# Patient Record
Sex: FEMALE | Race: WHITE | Employment: UNEMPLOYED | URBAN - METROPOLITAN AREA
[De-identification: names, ages, dates, MRNs, and addresses within clinical notes are randomized per-mention and may not be internally consistent; named-entity substitution may affect disease eponyms.]

---

## 2023-02-02 ENCOUNTER — APPOINTMENT (OUTPATIENT)
Dept: GENERAL RADIOLOGY | Age: 63
End: 2023-02-02
Payer: MEDICARE

## 2023-02-02 ENCOUNTER — HOSPITAL ENCOUNTER (EMERGENCY)
Age: 63
Discharge: HOME | End: 2023-02-02
Attending: EMERGENCY MEDICINE
Payer: MEDICARE

## 2023-02-02 VITALS
WEIGHT: 200 LBS | HEART RATE: 84 BPM | HEIGHT: 71 IN | SYSTOLIC BLOOD PRESSURE: 115 MMHG | RESPIRATION RATE: 18 BRPM | DIASTOLIC BLOOD PRESSURE: 62 MMHG | OXYGEN SATURATION: 100 % | BODY MASS INDEX: 28 KG/M2 | TEMPERATURE: 97.9 F

## 2023-02-02 DIAGNOSIS — M79.632 LEFT FOREARM PAIN: ICD-10-CM

## 2023-02-02 DIAGNOSIS — R11.2 NAUSEA AND VOMITING, UNSPECIFIED VOMITING TYPE: Primary | ICD-10-CM

## 2023-02-02 LAB — TROPONIN I SERPL DL<=0.01 NG/ML-MCNC: 11 NG/L (ref 0–14)

## 2023-02-02 PROCEDURE — 93005 ELECTROCARDIOGRAM TRACING: CPT | Performed by: EMERGENCY MEDICINE

## 2023-02-02 PROCEDURE — 99285 EMERGENCY DEPT VISIT HI MDM: CPT

## 2023-02-02 PROCEDURE — 71045 X-RAY EXAM CHEST 1 VIEW: CPT

## 2023-02-02 PROCEDURE — 84484 ASSAY OF TROPONIN QUANT: CPT

## 2023-02-02 PROCEDURE — 73090 X-RAY EXAM OF FOREARM: CPT

## 2023-02-02 PROCEDURE — 36415 COLL VENOUS BLD VENIPUNCTURE: CPT

## 2023-02-02 RX ORDER — SERTRALINE HYDROCHLORIDE 25 MG/1
TABLET, FILM COATED ORAL
COMMUNITY
Start: 2023-01-26

## 2023-02-02 RX ORDER — FENOFIBRATE 145 MG/1
145 TABLET, COATED ORAL DAILY
COMMUNITY
Start: 2023-01-26

## 2023-02-02 RX ORDER — INSULIN ASPART 100 [IU]/ML
INJECTION, SOLUTION INTRAVENOUS; SUBCUTANEOUS
COMMUNITY
Start: 2019-12-03

## 2023-02-02 RX ORDER — QUETIAPINE FUMARATE 25 MG/1
25 TABLET, FILM COATED ORAL EVERY EVENING
COMMUNITY
Start: 2023-01-26

## 2023-02-02 RX ORDER — HYDROXYZINE HYDROCHLORIDE 25 MG/1
25 TABLET, FILM COATED ORAL NIGHTLY PRN
COMMUNITY
Start: 2021-02-04

## 2023-02-02 RX ORDER — SPIRONOLACTONE 25 MG/1
25 TABLET ORAL DAILY
COMMUNITY
Start: 2023-01-26

## 2023-02-02 RX ORDER — METOPROLOL SUCCINATE 50 MG/1
50 TABLET, EXTENDED RELEASE ORAL DAILY
COMMUNITY
Start: 2023-01-26

## 2023-02-02 RX ORDER — GLIMEPIRIDE 4 MG/1
4 TABLET ORAL 2 TIMES DAILY WITH MEALS
COMMUNITY
Start: 2023-01-26

## 2023-02-02 RX ORDER — ICOSAPENT ETHYL 1000 MG/1
2 CAPSULE ORAL 2 TIMES DAILY WITH MEALS
Status: ON HOLD | COMMUNITY
End: 2023-02-09 | Stop reason: HOSPADM

## 2023-02-02 RX ORDER — ATORVASTATIN CALCIUM 20 MG/1
20 TABLET, FILM COATED ORAL NIGHTLY
COMMUNITY
Start: 2021-04-17

## 2023-02-02 RX ORDER — MIRABEGRON 25 MG/1
25 TABLET, FILM COATED, EXTENDED RELEASE ORAL DAILY
COMMUNITY
Start: 2023-01-26

## 2023-02-02 NOTE — ED PROVIDER NOTES
EMERGENCY DEPARTMENT ENCOUNTER    Pt Name: Darien Zhao  MRN: [id number]  [place] 1960  Date of evaluation: 2/2/23  CHIEF COMPLAINT       Chief Complaint   Patient presents with    Emesis     Started yesterday    Arm Pain     C/o pain left lower arm no known in jury     HISTORY OF PRESENT ILLNESS   Patient is a 69-year-old female who is brought in by family for vomiting and left arm pain. Patient reports vomiting 4 times yesterday on the bus. Symptoms have improved since that time and she had breakfast this morning without incident. She also complained to family of left arm pain yesterday. No trauma or injuries reported. REVIEW OF SYSTEMS     Review of Systems   All other systems reviewed and are negative. PASTMEDICAL HISTORY     Past Medical History:   Diagnosis Date    Cerebral artery occlusion with cerebral infarction ([facility] [address]     Diabetes mellitus ([facility] [address]     HTN (hypertension)     Mental disability      Past Problem List  There is no problem list on file for this patient. SURGICAL HISTORY     History reviewed. No pertinent surgical history.   CURRENT MEDICATIONS       Previous Medications    APIXABAN (ELIQUIS) 5 MG TABS TABLET    Take 5 mg by mouth 2 times daily    ATORVASTATIN (LIPITOR) 20 MG TABLET    Take 20 mg by mouth nightly    CYANOCOBALAMIN 1000 MCG TABLET    Take 1,000 mcg by mouth daily    FENOFIBRATE (TRICOR) 145 MG TABLET        GLIMEPIRIDE (AMARYL) 4 MG TABLET        HYDROXYZINE HCL (ATARAX) 25 MG TABLET    Take 25 mg by mouth nightly as needed    ICOSAPENT ETHYL (VASCEPA) 1 G CAPS CAPSULE    Take 2 g by mouth 2 times daily (with meals)    INSULIN ASPART (NOVOLOG FLEXPEN) 100 UNIT/ML INJECTION PEN    Inject into the skin 3 times daily (with meals)    METFORMIN (GLUCOPHAGE) 500 MG TABLET        METOPROLOL SUCCINATE (TOPROL XL) 50 MG EXTENDED RELEASE TABLET        MYRBETRIQ 25 MG TB24        QUETIAPINE (SEROQUEL) 25 MG TABLET        SERTRALINE (ZOLOFT) 25 MG TABLET        SPIRONOLACTONE (ALDACTONE) 25 MG TABLET         ALLERGIES     has No Known Allergies. FAMILY HISTORY     has no family status information on file. SOCIAL HISTORY       Social History     Tobacco Use    Smoking status: Never    Smokeless tobacco: Never   Substance Use Topics    Alcohol use: Not Currently    Drug use: Not Currently     PHYSICAL EXAM     INITIAL VITALS: /62   Pulse 84   Temp 97.9 °F (36.6 °C) (Oral)   Resp 18   Ht 5' 11\" (1.803 m)   Wt 200 lb (90.7 kg)   SpO2 100%   BMI 27.89 kg/m²    Physical Exam  Constitutional:       Appearance: Normal appearance. HENT:      Head: Normocephalic. Right Ear: External ear normal.      Left Ear: External ear normal.      Nose: Nose normal.   Eyes:      Conjunctiva/sclera: Conjunctivae normal.   Cardiovascular:      Rate and Rhythm: Regular rhythm. Heart sounds: Normal heart sounds. Pulmonary:      Breath sounds: Normal breath sounds. Abdominal:      General: There is no distension. Tenderness: There is no abdominal tenderness. Musculoskeletal:         General: No tenderness. Skin:     General: Skin is dry. Neurological:      Mental Status: She is alert. Mental status is at baseline. MEDICAL DECISION MAKING / ED COURSE:   Summary of Patient Presentation: Temperature 97.9, heart rate 84, and blood pressure 115/62. Pulse oximetry on room air is 100%. Physical exam unremarkable. Plan for EKG, troponin, chest x-ray, left forearm x-ray, likely discharge      1)  Number and Complexity of Problems  Problem List This Visit: Vomiting, arm pain. Differential Diagnosis: MSK pain. Diagnoses Considered but Do Not Suspect: Referred pain from heart. PE    Pertinent Comorbid Conditions: Cerebral palsy, diabetes, hypertension. 2)  Data Reviewed  Patient's EKG independently interpreted by me: No ST elevation, no ST depressions, no abnormal T wave morphology.     Decision Rules/Scores utilized:  N/A    HEART SCORE: 1    NIH STROKE SCALE: N/A, no focal neurodeficits. External Documents Reviewed: I have independently reviewed patient's previous medical records including labs, notes and imaging. Tests considered but not ordered and why:  N/A    Imaging that is independently reviewed and interpreted by me as: Chest x-ray negative for infiltrate. See more data below for the lab and radiology tests and orders. 3)  Treatment and Disposition    Patient repeat assessment:  N/A    Disposition discussion with patient/family: Patient aware and agrees with disposition plan. Case discussed with consulting clinician:  N/A    MIPS:  N/A    Social determinants of health impacting treatment or disposition:  None    Shared Decision Making completed with patient regarding risks and benefits of admission versus discharge. Patient decides to be discharged home. Code Status Discussion:  Not discussed    \"ED Course\" Notes From Epic Narrator:     Patient did well in the ED. Troponin negative. Chest x-ray negative for infiltrate. X-ray left forearm negative for acute osseous injury. Diagnosis most consistent with musculoskeletal pain. Patient feels well, asymptomatic in the ED. All questions answered. Given strict return precautions. No further work-up indicated this time. CRITICAL CARE:   N/A    PROCEDURES:  N/A      DATA FOR LAB AND RADIOLOGY TESTS ORDERED BELOW ARE REVIEWED BY THE ED CLINICIAN:    RADIOLOGY: All x-rays, CT, MRI, and formal ultrasound images (except ED bedside ultrasound) are read by the radiologist, see reports below, unless otherwise noted in MDM or here. Reports below are reviewed by myself. XR RADIUS ULNA LEFT (2 VIEWS)   Final Result   No acute osseous abnormality. XR CHEST PORTABLE   Final Result   No acute process. LABS: Lab orders shown below, the results are reviewed by myself, and all abnormals are listed below.   Labs Reviewed   TROPONIN       Vitals Reviewed:    Vitals:    02/02/23 6011 02/02/23 0936   BP: 115/62    Pulse: 84    Resp: 18    Temp: 97.9 °F (36.6 °C)    TempSrc: Oral    SpO2: 100%    Weight:  200 lb (90.7 kg)   Height:  5' 11\" (1.803 m)     MEDICATIONS GIVEN TO PATIENT THIS ENCOUNTER:  No orders of the defined types were placed in this encounter. DISCHARGE PRESCRIPTIONS:  New Prescriptions    No medications on file     PHYSICIAN CONSULTS ORDERED THIS ENCOUNTER:  None  FINAL IMPRESSION      1. Nausea and vomiting, unspecified vomiting type    2.  Left forearm pain          DISPOSITION/PLAN   DISPOSITION Decision To Discharge 02/02/2023 12:12:02 PM      OUTPATIENT FOLLOW UP THE PATIENT:  Gómez White MD  [facility] [address] [place] [address] [phone]    In 2 days      MD Harpal Chirinos MD  02/02/23 5105

## 2023-02-03 LAB
EKG ATRIAL RATE: 80 BPM
EKG P AXIS: 65 DEGREES
EKG P-R INTERVAL: 142 MS
EKG Q-T INTERVAL: 408 MS
EKG QRS DURATION: 80 MS
EKG QTC CALCULATION (BAZETT): 470 MS
EKG R AXIS: -46 DEGREES
EKG T AXIS: 55 DEGREES
EKG VENTRICULAR RATE: 80 BPM

## 2023-02-03 PROCEDURE — 93010 ELECTROCARDIOGRAM REPORT: CPT | Performed by: INTERNAL MEDICINE

## 2023-02-04 ENCOUNTER — APPOINTMENT (OUTPATIENT)
Dept: GENERAL RADIOLOGY | Age: 63
End: 2023-02-04
Payer: MEDICARE

## 2023-02-04 ENCOUNTER — HOSPITAL ENCOUNTER (INPATIENT)
Age: 63
LOS: 6 days | Discharge: HOME | End: 2023-02-10
Attending: EMERGENCY MEDICINE | Admitting: FAMILY MEDICINE
Payer: MEDICARE

## 2023-02-04 DIAGNOSIS — I47.1 PAROXYSMAL SUPRAVENTRICULAR TACHYCARDIA (HCC): Primary | ICD-10-CM

## 2023-02-04 DIAGNOSIS — I50.9 ACUTE CONGESTIVE HEART FAILURE, UNSPECIFIED HEART FAILURE TYPE (HCC): ICD-10-CM

## 2023-02-04 LAB
ABSOLUTE EOS #: <0.03 K/UL (ref 0–0.44)
ABSOLUTE IMMATURE GRANULOCYTE: 0.03 K/UL (ref 0–0.3)
ABSOLUTE LYMPH #: 0.75 K/UL (ref 1.1–3.7)
ABSOLUTE MONO #: 0.63 K/UL (ref 0.1–1.2)
ANION GAP SERPL CALCULATED.3IONS-SCNC: 14 MMOL/L (ref 9–17)
BASOPHILS # BLD: 0 % (ref 0–2)
BASOPHILS ABSOLUTE: 0.03 K/UL (ref 0–0.2)
BNP SERPL-MCNC: 2364 PG/ML
BUN SERPL-MCNC: 26 MG/DL (ref 8–23)
BUN/CREAT BLD: 26 (ref 9–20)
CALCIUM SERPL-MCNC: 9 MG/DL (ref 8.6–10.4)
CHLORIDE SERPL-SCNC: 100 MMOL/L (ref 98–107)
CO2 SERPL-SCNC: 16 MMOL/L (ref 20–31)
CREAT SERPL-MCNC: 1.01 MG/DL (ref 0.5–0.9)
EOSINOPHILS RELATIVE PERCENT: 0 % (ref 1–4)
GFR SERPL CREATININE-BSD FRML MDRD: >60 ML/MIN/1.73M2
GLUCOSE BLD-MCNC: 189 MG/DL (ref 65–105)
GLUCOSE SERPL-MCNC: 274 MG/DL (ref 70–99)
HCT VFR BLD AUTO: 39.4 % (ref 36.3–47.1)
HGB BLD-MCNC: 12.5 G/DL (ref 11.9–15.1)
IMMATURE GRANULOCYTES: 0 %
LYMPHOCYTES # BLD: 10 % (ref 24–43)
MCH RBC QN AUTO: 27.4 PG (ref 25.2–33.5)
MCHC RBC AUTO-ENTMCNC: 31.7 G/DL (ref 28.4–34.8)
MCV RBC AUTO: 86.4 FL (ref 82.6–102.9)
MONOCYTES # BLD: 8 % (ref 3–12)
MYOGLOBIN SERPL-MCNC: 72 NG/ML (ref 25–58)
MYOGLOBIN SERPL-MCNC: 81 NG/ML (ref 25–58)
NRBC AUTOMATED: 0 PER 100 WBC
PDW BLD-RTO: 14.5 % (ref 11.8–14.4)
PLATELET # BLD AUTO: 136 K/UL (ref 138–453)
PMV BLD AUTO: 10.9 FL (ref 8.1–13.5)
POTASSIUM SERPL-SCNC: 3.6 MMOL/L (ref 3.7–5.3)
RBC # BLD: 4.56 M/UL (ref 3.95–5.11)
RBC # BLD: ABNORMAL 10*6/UL
SEG NEUTROPHILS: 82 % (ref 36–65)
SEGMENTED NEUTROPHILS ABSOLUTE COUNT: 6.07 K/UL (ref 1.5–8.1)
SODIUM SERPL-SCNC: 130 MMOL/L (ref 135–144)
TROPONIN I SERPL DL<=0.01 NG/ML-MCNC: 33 NG/L (ref 0–14)
TROPONIN I SERPL DL<=0.01 NG/ML-MCNC: 49 NG/L (ref 0–14)
TSH SERPL-ACNC: 4.96 UIU/ML (ref 0.3–5)
WBC # BLD AUTO: 7.5 K/UL (ref 3.5–11.3)

## 2023-02-04 PROCEDURE — 83880 ASSAY OF NATRIURETIC PEPTIDE: CPT

## 2023-02-04 PROCEDURE — 93005 ELECTROCARDIOGRAM TRACING: CPT | Performed by: EMERGENCY MEDICINE

## 2023-02-04 PROCEDURE — 99285 EMERGENCY DEPT VISIT HI MDM: CPT

## 2023-02-04 PROCEDURE — 96374 THER/PROPH/DIAG INJ IV PUSH: CPT

## 2023-02-04 PROCEDURE — 71045 X-RAY EXAM CHEST 1 VIEW: CPT

## 2023-02-04 PROCEDURE — 80048 BASIC METABOLIC PNL TOTAL CA: CPT

## 2023-02-04 PROCEDURE — 84443 ASSAY THYROID STIM HORMONE: CPT

## 2023-02-04 PROCEDURE — 6360000002 HC RX W HCPCS: Performed by: EMERGENCY MEDICINE

## 2023-02-04 PROCEDURE — 85025 COMPLETE CBC W/AUTO DIFF WBC: CPT

## 2023-02-04 PROCEDURE — 84484 ASSAY OF TROPONIN QUANT: CPT

## 2023-02-04 PROCEDURE — 82947 ASSAY GLUCOSE BLOOD QUANT: CPT

## 2023-02-04 PROCEDURE — 96375 TX/PRO/DX INJ NEW DRUG ADDON: CPT

## 2023-02-04 PROCEDURE — 83874 ASSAY OF MYOGLOBIN: CPT

## 2023-02-04 PROCEDURE — 2060000000 HC ICU INTERMEDIATE R&B

## 2023-02-04 RX ORDER — ADENOSINE 3 MG/ML
6 INJECTION, SOLUTION INTRAVENOUS ONCE
Status: COMPLETED | OUTPATIENT
Start: 2023-02-04 | End: 2023-02-04

## 2023-02-04 RX ORDER — SODIUM CHLORIDE 9 MG/ML
INJECTION, SOLUTION INTRAVENOUS PRN
Status: DISCONTINUED | OUTPATIENT
Start: 2023-02-04 | End: 2023-02-10 | Stop reason: HOSPADM

## 2023-02-04 RX ORDER — POLYETHYLENE GLYCOL 3350 17 G/17G
17 POWDER, FOR SOLUTION ORAL DAILY PRN
Status: DISCONTINUED | OUTPATIENT
Start: 2023-02-04 | End: 2023-02-10 | Stop reason: HOSPADM

## 2023-02-04 RX ORDER — ACETAMINOPHEN 325 MG/1
650 TABLET ORAL EVERY 6 HOURS PRN
Status: DISCONTINUED | OUTPATIENT
Start: 2023-02-04 | End: 2023-02-10 | Stop reason: HOSPADM

## 2023-02-04 RX ORDER — ADENOSINE 3 MG/ML
6 INJECTION, SOLUTION INTRAVENOUS ONCE
Status: DISCONTINUED | OUTPATIENT
Start: 2023-02-04 | End: 2023-02-10 | Stop reason: HOSPADM

## 2023-02-04 RX ORDER — POTASSIUM CHLORIDE 20 MEQ/1
40 TABLET, EXTENDED RELEASE ORAL PRN
Status: DISCONTINUED | OUTPATIENT
Start: 2023-02-04 | End: 2023-02-10 | Stop reason: HOSPADM

## 2023-02-04 RX ORDER — FUROSEMIDE 10 MG/ML
40 INJECTION INTRAMUSCULAR; INTRAVENOUS DAILY
Status: DISCONTINUED | OUTPATIENT
Start: 2023-02-05 | End: 2023-02-08

## 2023-02-04 RX ORDER — ACETAMINOPHEN 650 MG/1
650 SUPPOSITORY RECTAL EVERY 6 HOURS PRN
Status: DISCONTINUED | OUTPATIENT
Start: 2023-02-04 | End: 2023-02-10 | Stop reason: HOSPADM

## 2023-02-04 RX ORDER — ENOXAPARIN SODIUM 100 MG/ML
40 INJECTION SUBCUTANEOUS DAILY
Status: DISCONTINUED | OUTPATIENT
Start: 2023-02-04 | End: 2023-02-05

## 2023-02-04 RX ORDER — ADENOSINE 3 MG/ML
INJECTION, SOLUTION INTRAVENOUS
Status: DISPENSED
Start: 2023-02-04 | End: 2023-02-05

## 2023-02-04 RX ORDER — POTASSIUM CHLORIDE 7.45 MG/ML
10 INJECTION INTRAVENOUS PRN
Status: DISCONTINUED | OUTPATIENT
Start: 2023-02-04 | End: 2023-02-10 | Stop reason: HOSPADM

## 2023-02-04 RX ORDER — SODIUM CHLORIDE 0.9 % (FLUSH) 0.9 %
10 SYRINGE (ML) INJECTION PRN
Status: DISCONTINUED | OUTPATIENT
Start: 2023-02-04 | End: 2023-02-10 | Stop reason: HOSPADM

## 2023-02-04 RX ORDER — FUROSEMIDE 10 MG/ML
20 INJECTION INTRAMUSCULAR; INTRAVENOUS ONCE
Status: COMPLETED | OUTPATIENT
Start: 2023-02-04 | End: 2023-02-04

## 2023-02-04 RX ORDER — ONDANSETRON 4 MG/1
4 TABLET, ORALLY DISINTEGRATING ORAL EVERY 8 HOURS PRN
Status: DISCONTINUED | OUTPATIENT
Start: 2023-02-04 | End: 2023-02-10 | Stop reason: HOSPADM

## 2023-02-04 RX ORDER — ONDANSETRON 2 MG/ML
4 INJECTION INTRAMUSCULAR; INTRAVENOUS EVERY 6 HOURS PRN
Status: DISCONTINUED | OUTPATIENT
Start: 2023-02-04 | End: 2023-02-10 | Stop reason: HOSPADM

## 2023-02-04 RX ORDER — SODIUM CHLORIDE 0.9 % (FLUSH) 0.9 %
5-40 SYRINGE (ML) INJECTION EVERY 12 HOURS SCHEDULED
Status: DISCONTINUED | OUTPATIENT
Start: 2023-02-04 | End: 2023-02-10 | Stop reason: HOSPADM

## 2023-02-04 RX ORDER — MAGNESIUM SULFATE 1 G/100ML
1000 INJECTION INTRAVENOUS PRN
Status: DISCONTINUED | OUTPATIENT
Start: 2023-02-04 | End: 2023-02-10 | Stop reason: HOSPADM

## 2023-02-04 RX ADMIN — FUROSEMIDE 20 MG: 10 INJECTION, SOLUTION INTRAMUSCULAR; INTRAVENOUS at 20:06

## 2023-02-04 RX ADMIN — ADENOSINE 6 MG: 3 INJECTION, SOLUTION INTRAVENOUS at 19:00

## 2023-02-04 ASSESSMENT — PAIN - FUNCTIONAL ASSESSMENT: PAIN_FUNCTIONAL_ASSESSMENT: NONE - DENIES PAIN

## 2023-02-04 NOTE — ED NOTES
Upon placing pt on cardiac monitor, pt heart rate is 160 bpm. Pt denies any chest pain or shortness of breath. Pt denies ANY pain at this time.       Dixie Jimenez RN  02/04/23 7809

## 2023-02-05 LAB
ABSOLUTE EOS #: <0.03 K/UL (ref 0–0.44)
ABSOLUTE IMMATURE GRANULOCYTE: 0.03 K/UL (ref 0–0.3)
ABSOLUTE LYMPH #: 0.96 K/UL (ref 1.1–3.7)
ABSOLUTE MONO #: 0.6 K/UL (ref 0.1–1.2)
ANION GAP SERPL CALCULATED.3IONS-SCNC: 8 MMOL/L (ref 9–17)
BASOPHILS # BLD: 0 % (ref 0–2)
BASOPHILS ABSOLUTE: <0.03 K/UL (ref 0–0.2)
BUN SERPL-MCNC: 26 MG/DL (ref 8–23)
BUN/CREAT BLD: 26 (ref 9–20)
CALCIUM SERPL-MCNC: 8.7 MG/DL (ref 8.6–10.4)
CHLORIDE SERPL-SCNC: 102 MMOL/L (ref 98–107)
CO2 SERPL-SCNC: 26 MMOL/L (ref 20–31)
CREAT SERPL-MCNC: 1.01 MG/DL (ref 0.5–0.9)
EOSINOPHILS RELATIVE PERCENT: 0 % (ref 1–4)
GFR SERPL CREATININE-BSD FRML MDRD: >60 ML/MIN/1.73M2
GLUCOSE BLD-MCNC: 101 MG/DL (ref 65–105)
GLUCOSE BLD-MCNC: 125 MG/DL (ref 65–105)
GLUCOSE BLD-MCNC: 132 MG/DL (ref 65–105)
GLUCOSE BLD-MCNC: 165 MG/DL (ref 65–105)
GLUCOSE BLD-MCNC: 177 MG/DL (ref 65–105)
GLUCOSE SERPL-MCNC: 140 MG/DL (ref 70–99)
HCT VFR BLD AUTO: 36.9 % (ref 36.3–47.1)
HGB BLD-MCNC: 11.5 G/DL (ref 11.9–15.1)
IMMATURE GRANULOCYTES: 1 %
INR PPP: 1.7
LYMPHOCYTES # BLD: 16 % (ref 24–43)
MAGNESIUM SERPL-MCNC: 1.6 MG/DL (ref 1.6–2.6)
MCH RBC QN AUTO: 27.4 PG (ref 25.2–33.5)
MCHC RBC AUTO-ENTMCNC: 31.2 G/DL (ref 28.4–34.8)
MCV RBC AUTO: 87.9 FL (ref 82.6–102.9)
MONOCYTES # BLD: 10 % (ref 3–12)
NRBC AUTOMATED: 0 PER 100 WBC
PDW BLD-RTO: 14.4 % (ref 11.8–14.4)
PLATELET # BLD AUTO: 133 K/UL (ref 138–453)
PMV BLD AUTO: 10.4 FL (ref 8.1–13.5)
POTASSIUM SERPL-SCNC: 3.5 MMOL/L (ref 3.7–5.3)
PROTHROMBIN TIME: 19.9 SEC (ref 11.5–14.2)
RBC # BLD: 4.2 M/UL (ref 3.95–5.11)
SEG NEUTROPHILS: 73 % (ref 36–65)
SEGMENTED NEUTROPHILS ABSOLUTE COUNT: 4.4 K/UL (ref 1.5–8.1)
SODIUM SERPL-SCNC: 136 MMOL/L (ref 135–144)
WBC # BLD AUTO: 6 K/UL (ref 3.5–11.3)

## 2023-02-05 PROCEDURE — 36415 COLL VENOUS BLD VENIPUNCTURE: CPT

## 2023-02-05 PROCEDURE — 97530 THERAPEUTIC ACTIVITIES: CPT

## 2023-02-05 PROCEDURE — 85025 COMPLETE CBC W/AUTO DIFF WBC: CPT

## 2023-02-05 PROCEDURE — 82947 ASSAY GLUCOSE BLOOD QUANT: CPT

## 2023-02-05 PROCEDURE — 6370000000 HC RX 637 (ALT 250 FOR IP): Performed by: FAMILY MEDICINE

## 2023-02-05 PROCEDURE — 97163 PT EVAL HIGH COMPLEX 45 MIN: CPT

## 2023-02-05 PROCEDURE — 6360000002 HC RX W HCPCS: Performed by: NURSE PRACTITIONER

## 2023-02-05 PROCEDURE — 85610 PROTHROMBIN TIME: CPT

## 2023-02-05 PROCEDURE — 6370000000 HC RX 637 (ALT 250 FOR IP): Performed by: NURSE PRACTITIONER

## 2023-02-05 PROCEDURE — 83735 ASSAY OF MAGNESIUM: CPT

## 2023-02-05 PROCEDURE — 2580000003 HC RX 258: Performed by: NURSE PRACTITIONER

## 2023-02-05 PROCEDURE — 80048 BASIC METABOLIC PNL TOTAL CA: CPT

## 2023-02-05 PROCEDURE — 94761 N-INVAS EAR/PLS OXIMETRY MLT: CPT

## 2023-02-05 PROCEDURE — 2700000000 HC OXYGEN THERAPY PER DAY

## 2023-02-05 PROCEDURE — 2060000000 HC ICU INTERMEDIATE R&B

## 2023-02-05 RX ORDER — QUETIAPINE FUMARATE 25 MG/1
25 TABLET, FILM COATED ORAL NIGHTLY
Status: DISCONTINUED | OUTPATIENT
Start: 2023-02-05 | End: 2023-02-10 | Stop reason: HOSPADM

## 2023-02-05 RX ORDER — ATORVASTATIN CALCIUM 20 MG/1
20 TABLET, FILM COATED ORAL NIGHTLY
Status: DISCONTINUED | OUTPATIENT
Start: 2023-02-05 | End: 2023-02-10 | Stop reason: HOSPADM

## 2023-02-05 RX ORDER — HYDROXYZINE HYDROCHLORIDE 25 MG/1
25 TABLET, FILM COATED ORAL NIGHTLY PRN
Status: DISCONTINUED | OUTPATIENT
Start: 2023-02-05 | End: 2023-02-10 | Stop reason: HOSPADM

## 2023-02-05 RX ORDER — OMEGA-3-ACID ETHYL ESTERS 1 G/1
1 CAPSULE, LIQUID FILLED ORAL 2 TIMES DAILY
Status: DISCONTINUED | OUTPATIENT
Start: 2023-02-05 | End: 2023-02-10 | Stop reason: HOSPADM

## 2023-02-05 RX ORDER — INSULIN LISPRO 100 [IU]/ML
0-8 INJECTION, SOLUTION INTRAVENOUS; SUBCUTANEOUS
Status: DISCONTINUED | OUTPATIENT
Start: 2023-02-05 | End: 2023-02-09

## 2023-02-05 RX ORDER — METOPROLOL SUCCINATE 50 MG/1
50 TABLET, EXTENDED RELEASE ORAL DAILY
Status: DISCONTINUED | OUTPATIENT
Start: 2023-02-05 | End: 2023-02-10 | Stop reason: HOSPADM

## 2023-02-05 RX ORDER — METHENAMINE HIPPURATE 1000 MG/1
1 TABLET ORAL 2 TIMES DAILY WITH MEALS
COMMUNITY

## 2023-02-05 RX ORDER — GLIPIZIDE 5 MG/1
2.5 TABLET ORAL
Status: DISCONTINUED | OUTPATIENT
Start: 2023-02-06 | End: 2023-02-10 | Stop reason: HOSPADM

## 2023-02-05 RX ORDER — SPIRONOLACTONE 25 MG/1
25 TABLET ORAL DAILY
Status: DISCONTINUED | OUTPATIENT
Start: 2023-02-05 | End: 2023-02-10 | Stop reason: HOSPADM

## 2023-02-05 RX ORDER — DEXTROSE MONOHYDRATE 100 MG/ML
INJECTION, SOLUTION INTRAVENOUS CONTINUOUS PRN
Status: DISCONTINUED | OUTPATIENT
Start: 2023-02-05 | End: 2023-02-10 | Stop reason: HOSPADM

## 2023-02-05 RX ORDER — FENOFIBRATE 54 MG/1
54 TABLET ORAL DAILY
Status: DISCONTINUED | OUTPATIENT
Start: 2023-02-05 | End: 2023-02-10 | Stop reason: HOSPADM

## 2023-02-05 RX ORDER — INSULIN LISPRO 100 [IU]/ML
0-4 INJECTION, SOLUTION INTRAVENOUS; SUBCUTANEOUS NIGHTLY
Status: DISCONTINUED | OUTPATIENT
Start: 2023-02-05 | End: 2023-02-09

## 2023-02-05 RX ADMIN — APIXABAN 5 MG: 5 TABLET, FILM COATED ORAL at 20:05

## 2023-02-05 RX ADMIN — SODIUM CHLORIDE, PRESERVATIVE FREE 10 ML: 5 INJECTION INTRAVENOUS at 20:06

## 2023-02-05 RX ADMIN — OMEGA-3-ACID ETHYL ESTERS 1 G: 1 CAPSULE, LIQUID FILLED ORAL at 20:10

## 2023-02-05 RX ADMIN — SODIUM CHLORIDE, PRESERVATIVE FREE 10 ML: 5 INJECTION INTRAVENOUS at 08:46

## 2023-02-05 RX ADMIN — SPIRONOLACTONE 25 MG: 25 TABLET ORAL at 11:56

## 2023-02-05 RX ADMIN — ACETAMINOPHEN 650 MG: 325 TABLET ORAL at 08:40

## 2023-02-05 RX ADMIN — ACETAMINOPHEN 650 MG: 325 TABLET ORAL at 18:18

## 2023-02-05 RX ADMIN — ATORVASTATIN CALCIUM 20 MG: 20 TABLET, FILM COATED ORAL at 20:05

## 2023-02-05 RX ADMIN — SERTRALINE 25 MG: 50 TABLET, FILM COATED ORAL at 11:56

## 2023-02-05 RX ADMIN — FENOFIBRATE 54 MG: 54 TABLET ORAL at 12:57

## 2023-02-05 RX ADMIN — FUROSEMIDE 40 MG: 10 INJECTION, SOLUTION INTRAMUSCULAR; INTRAVENOUS at 08:40

## 2023-02-05 RX ADMIN — POTASSIUM CHLORIDE 40 MEQ: 1500 TABLET, EXTENDED RELEASE ORAL at 08:40

## 2023-02-05 RX ADMIN — ACETAMINOPHEN 650 MG: 325 TABLET ORAL at 23:30

## 2023-02-05 RX ADMIN — APIXABAN 5 MG: 5 TABLET, FILM COATED ORAL at 11:57

## 2023-02-05 RX ADMIN — QUETIAPINE FUMARATE 25 MG: 25 TABLET ORAL at 20:05

## 2023-02-05 RX ADMIN — METOPROLOL SUCCINATE 50 MG: 50 TABLET, EXTENDED RELEASE ORAL at 11:57

## 2023-02-05 ASSESSMENT — PAIN SCALES - GENERAL: PAINLEVEL_OUTOF10: 7

## 2023-02-05 ASSESSMENT — PAIN DESCRIPTION - DESCRIPTORS: DESCRIPTORS: ACHING

## 2023-02-05 ASSESSMENT — PAIN DESCRIPTION - LOCATION: LOCATION: HEAD

## 2023-02-05 NOTE — ED PROVIDER NOTES
[facility] ED  EMERGENCY DEPARTMENT ENCOUNTER      Pt Name: Jaden Davies  MRN: [id number]  [place] 1960  Date of evaluation: 2/4/2023  Provider: Marybelle Krabbe, MD    CHIEF COMPLAINT       Chief Complaint   Patient presents with    Fall     Two falls at group home today    Extremity Weakness     Ambulates with walker but group home concerned pt is unable to get around anymore          HISTORY OF PRESENT ILLNESS  (Location/Symptom, Timing/Onset, Context/Setting, Quality, Duration, Modifying Factors, Severity.)   Jaden Davies is a 60 y.o. female who presents to the emergency department because she fell. She was eased down to the ground and did not injure herself in any fashion. Her heart rate was noted to be fast.  She is not complaining of shortness of breath. She has MRDD and is not a good historian. Her sister accompanies her and gives some of the history. There is no previous diagnosis of CHF. She has not had a fever. She lives in a group home. This occurred just before coming into the emergency department. Nursing Notes were reviewed. ALLERGIES     Patient has no known allergies.     CURRENT MEDICATIONS       Previous Medications    APIXABAN (ELIQUIS) 5 MG TABS TABLET    Take 5 mg by mouth 2 times daily    ATORVASTATIN (LIPITOR) 20 MG TABLET    Take 20 mg by mouth nightly    CYANOCOBALAMIN 1000 MCG TABLET    Take 1,000 mcg by mouth daily    FENOFIBRATE (TRICOR) 145 MG TABLET        GLIMEPIRIDE (AMARYL) 4 MG TABLET        HYDROXYZINE HCL (ATARAX) 25 MG TABLET    Take 25 mg by mouth nightly as needed    ICOSAPENT ETHYL (VASCEPA) 1 G CAPS CAPSULE    Take 2 g by mouth 2 times daily (with meals)    INSULIN ASPART (NOVOLOG FLEXPEN) 100 UNIT/ML INJECTION PEN    Inject into the skin 3 times daily (with meals)    METFORMIN (GLUCOPHAGE) 500 MG TABLET        METOPROLOL SUCCINATE (TOPROL XL) 50 MG EXTENDED RELEASE TABLET        MYRBETRIQ 25 MG TB24        QUETIAPINE (SEROQUEL) 25 MG TABLET SERTRALINE (ZOLOFT) 25 MG TABLET        SPIRONOLACTONE (ALDACTONE) 25 MG TABLET           PAST MEDICAL HISTORY         Diagnosis Date    Cerebral artery occlusion with cerebral infarction ([facility] [address]     Diabetes mellitus ([facility] [address]     HTN (hypertension)     Mental disability        SURGICAL HISTORY     History reviewed. No pertinent surgical history. FAMILY HISTORY     History reviewed. No pertinent family history. No family status information on file. SOCIAL HISTORY      reports that she has never smoked. She has never used smokeless tobacco. She reports that she does not currently use alcohol. She reports that she does not currently use drugs. REVIEW OF SYSTEMS    (2-9 systems for level 4, 10 or more for level 5)     Review of Systems   Unable to perform ROS: Psychiatric disorder      Except as noted above the remainder of the review of systems was reviewed and negative. PHYSICAL EXAM    (up to 7 for level 4, 8 or more for level 5)     Vitals:    02/04/23 1930 02/04/23 1936 02/04/23 1945 02/04/23 1956   BP: 111/72  110/71    Pulse: (!) 102 (!) 101 (!) 101 100   Resp: 24 22 25 22   Temp:       TempSrc:       SpO2: 93% 92% 94% 94%       Physical Exam  Vitals reviewed. Constitutional:       General: She is not in acute distress. Appearance: She is well-developed. She is not diaphoretic. HENT:      Head: Normocephalic and atraumatic. Eyes:      General: No scleral icterus. Right eye: No discharge. Left eye: No discharge. Cardiovascular:      Rate and Rhythm: Regular rhythm. Tachycardia present. Pulmonary:      Effort: Pulmonary effort is normal. No respiratory distress. Breath sounds: Normal breath sounds. No stridor. No wheezing or rales. Abdominal:      General: There is no distension. Palpations: Abdomen is soft. Tenderness: There is no abdominal tenderness. Comments: Abdomen is obese   Musculoskeletal:         General: Normal range of motion. Cervical back: Neck supple. Lymphadenopathy:      Cervical: No cervical adenopathy. Skin:     General: Skin is warm and dry. Findings: No erythema or rash. Neurological:      Mental Status: She is alert. Comments: Awake and alert   Psychiatric:         Behavior: Behavior normal.           DIAGNOSTIC RESULTS     EKG: All EKG's are interpreted by the Emergency Department Physician who either signs or Co-signs this chart in the absence of a cardiologist.    Initial EKG on my interpretation shows SVT. Repeat EKG on my interpretation shows sinus tachycardia    RADIOLOGY:   Non-plain film images such as CT, Ultrasound and MRI are read by the radiologist. Plain radiographic images are visualized and preliminarily interpreted by the emergency physician with the below findings:    Interpretation per the Radiologist below, if available at the time of this note:    XR CHEST PORTABLE    Result Date: 2/4/2023  EXAMINATION: [address] 2/4/2023 7:00 pm COMPARISON: 02/02/2023 HISTORY: ORDERING SYSTEM PROVIDED HISTORY: weak TECHNOLOGIST PROVIDED HISTORY: weak Reason for Exam: Fall; Extremity Weakness Additional signs and symptoms: Fall; Extremity Weakness FINDINGS: The cardiomediastinal silhouette is enlarged. No pleural effusion or pneumothorax. Patchy interstitial and alveolar opacities, new from prior study. Findings favoring moderate CHF pattern pulmonary vascular congestion. Difficult to exclude superimposed infection. Attention on follow-up.        LABS:  Labs Reviewed   CBC WITH AUTO DIFFERENTIAL - Abnormal; Notable for the following components:       Result Value    RDW 14.5 (*)     Platelets 473 (*)     Seg Neutrophils 82 (*)     Lymphocytes 10 (*)     Eosinophils % 0 (*)     Absolute Lymph # 0.75 (*)     All other components within normal limits   BASIC METABOLIC PANEL - Abnormal; Notable for the following components:    Glucose 274 (*)     BUN 26 (*)     Creatinine 1.01 (*) Bun/Cre Ratio 26 (*)     Sodium 130 (*)     Potassium 3.6 (*)     CO2 16 (*)     All other components within normal limits   TROP/MYOGLOBIN - Abnormal; Notable for the following components:    Troponin, High Sensitivity 33 (*)     Myoglobin 72 (*)     All other components within normal limits   BRAIN NATRIURETIC PEPTIDE - Abnormal; Notable for the following components:    Pro-BNP 2,364 (*)     All other components within normal limits   TSH       All other labs were within normal range or not returned as of this dictation. EMERGENCY DEPARTMENT COURSE and DIFFERENTIAL DIAGNOSIS/MDM:   Vitals:    Vitals:    02/04/23 1930 02/04/23 1936 02/04/23 1945 02/04/23 1956   BP: 111/72  110/71    Pulse: (!) 102 (!) 101 (!) 101 100   Resp: 24 22 25 22   Temp:       TempSrc:       SpO2: 93% 92% 94% 94%       Orders Placed This Encounter   Medications    adenosine (ADENOCARD) injection 6 mg    adenosine (ADENOCARD) 6 MG/2ML injection     GRZEGORZ MEIER: normainet override    adenosine (ADENOCARD) injection 6 mg    furosemide (LASIX) injection 20 mg       HEART SCORE: Not applicable    Medical Decision Making: The patient presented with SVT. She was given 6 mg of adenosine which ultimately put her back into a normal sinus rhythm with a rate of around 100. Very slight elevation in the troponin present. Repeat pending. Additionally the patient has CHF on the chest x-ray and her BNP is elevated. She was given 20 mg of IV Lasix and she is being admitted. Findings are discussed thoroughly with the patient's sister. Evaluation and treatment course in the ED, and plan of care upon discharge was discussed in length with the patient's sister. DDx include SVT, A. fib, a flutter    CRITICAL CARE TIME     Due to the high probability of sudden and clinically significant deterioration in the patient's condition she required highest level of my preparedness to intervene urgently.  I provided critical care time including documentation time, medication orders and management, reevaluation, vital sign assessment, ordering and reviewing of of lab tests ordering and reviewing of x-ray studies, and admission orders. Aggregate critical care time is 40 minutes including only time during which I was engaged in work directly related to her care and did not include time spent treating other patients simultaneously. I will order labs including CBC, BMP, BNP, TSH, complete CXR and monitor closely. Test considered, but not ordered: None    The patient was involved in his/her plan of care. The testing that was ordered was discussed with the patient. Any medications that may have been ordered were discussed with the patient. I have reviewed the patient's previous medical records using the electronic health record that we have available. Labs and imaging were reviewed. I have discusssed recommendation for admission with the patient and/or family. We have discussed benefits of admission and the risks of discharge home. The patient agrees with the plan of care and admission. The patient will be admitted for further evaluation and treatment. I have consulted the admitting service. The patient will be admitted to them, he/she agrees to accept the patient for further evaluation and treatment. CONSULTS:  IP CONSULT TO HOSPITALIST    PROCEDURES:  None    FINAL IMPRESSION      1. Paroxysmal supraventricular tachycardia ([facility] [address]    2. Acute congestive heart failure, unspecified heart failure type [facility])          DISPOSITION/PLAN   DISPOSITION Decision To Admit 02/04/2023 07:58:59 PM      PATIENT REFERRED TO:   No follow-up provider specified. DISCHARGE MEDICATIONS:     New Prescriptions    No medications on file       The care is provided during an unprecedented national emergency due to the novel coronavirus, COVID-19.     (Please note that portions of this note were completed with a voice recognition program.  Efforts were made to edit the dictations but occasionally words are mis-transcribed.)    Jacqueline Pike MD  Attending Emergency Physician           Jacqueline Pike MD  02/04/23 2004

## 2023-02-05 NOTE — CARE COORDINATION
CASE MANAGEMENT NOTE:    Admission Date:  2/4/2023 Govind Domingo is a 60 y.o.  female    Admitted for : Paroxysmal supraventricular tachycardia ([facility] [address] [I47.1]  New onset of congestive heart failure ([facility] [address] [I50.9]  Acute congestive heart failure, unspecified heart failure type ([facility] [address] [I50.9]    Met with:  Family    PCP:  dr. Wisam Sanchez MD                                Insurance:  [employer] and medicaid       Is patient alert and oriented at time of discussion:  No    Current Residence/ Living Arrangements:  at home dependent on nursing care             Current Services PTA:  Yes    Does patient go to outpatient dialysis: No  If yes, location and chair time: n/a  Who is their nephrologist? N/a    Is patient agreeable to VNS: No    Freedom of choice provided:  Yes    List of [address] provided: No    VNS chosen:  Yes    DME:  walker, shower chair, and other hospital bed 14years old but still works per sister. Has sedaato also. Home Oxygen: No    Nebulizer: No    CPAP/BIPAP: No    Supplier: N/A    Potential Assistance Needed: Yes    SNF needed: No    Freedom of choice and list provided: Yes    Pharmacy:  [facility]       Is patient currently receiving oral anticoagulation therapy? NA    Is the Patient an HERMILO CHAPPELL [facility] with Readmission Risk Score greater than 14%? No  If yes, pt needs a follow up appointment made within 7 days. Family Members/Caregivers that are willing and able to care for patient at home:    Yes    If yes, list name here:  sister Raymond Kauffman [phone] rents a home for pt and her brother both MRDD. Sister has arranged all dme and 24/7 care for pt in her home from [address].      Family/caregivers educated on resources available including DME and respite care: Yes    Transportation Provider:  Family             Discharge Plan:  The Plan for Transition of Care is related to the following treatment goals: to return home with 24/7 care with her brother living with her and other family helps them out. The Patient and/or patient representative sister was provided with a choice of provider and agrees   with the discharge plan. [x] Yes [] No    Freedom of choice list was provided with basic dialogue that supports the patient's individualized plan of care/goals, treatment preferences and shares the quality data associated with the providers.  [x] Yes [] No                  Electronically signed by: ESTEE Mendez, CARIDAD on 2/5/2023 at 4:07 PM

## 2023-02-05 NOTE — PROGRESS NOTES
Pt arrives to 2035 via cart from the ER. Transferred pt to bed, monitors placed and pt assessed. Pt oriented to room, call light, how to turn lights on and off as well as how to operate TV. Once pt assessed, pt provided with a boxed lunch after blood sugar checked.

## 2023-02-05 NOTE — ED NOTES
External catheter placed. Family is at bedside. Pt respirations are even and unlabored, pt is alert and oriented X 4, bed is in the lowest position, call light is within reach. Will continue to monitor.        Jay Edwards RN  02/04/23 2011

## 2023-02-05 NOTE — PROGRESS NOTES
Changed pt's brief r/t Purewick leaking and pt had a stool. In the process of repositioning pt while cleaning her, pt seemed to get frustrated with the rolling back and forth to facilitate clean linens, she sat up and shook her fists and in a loud voice stated \"make up your mind\" when making the final turn with clean linens.

## 2023-02-05 NOTE — PLAN OF CARE
Problem: Chronic Conditions and Co-morbidities  Goal: Patient's chronic conditions and co-morbidity symptoms are monitored and maintained or improved  Outcome: Progressing  Flowsheets (Taken 2/5/2023 1547)  Care Plan - Patient's Chronic Conditions and Co-Morbidity Symptoms are Monitored and Maintained or Improved:   Collaborate with multidisciplinary team to address chronic and comorbid conditions and prevent exacerbation or deterioration   Monitor and assess patient's chronic conditions and comorbid symptoms for stability, deterioration, or improvement     Problem: Discharge Planning  Goal: Discharge to home or other facility with appropriate resources  Outcome: Progressing  Flowsheets (Taken 2/5/2023 1545)  Discharge to home or other facility with appropriate resources:   Identify discharge learning needs (meds, wound care, etc)   Identify barriers to discharge with patient and caregiver     Problem: ABCDS Injury Assessment  Goal: Absence of physical injury  Outcome: Progressing  Flowsheets (Taken 2/5/2023 1549)  Absence of Physical Injury: Implement safety measures based on patient assessment

## 2023-02-05 NOTE — ED TRIAGE NOTES
Pt brought to 15 by EMS from group home with co fall from standing and UE/LE bilateral weakness. Pt denies pain. Pt denies hitting head. Pt denies LOC. Care giver at bedside. Pt respirations are even and unlabored, pt is alert and oriented X 4, speaking in complete sentences, bed is in the lowest position, call light is within reach. Will continue to monitor.

## 2023-02-05 NOTE — H&P
History & Physical  [facility].,    Adult Hospitalist      Name: Sherwin Mcgregor  MRN: [id number]     Acct: [de-identified]  Room: 2035/2035-01    Admit Date: 2/4/2023  6:38 PM  PCP: Cl Medley MD    Primary Problem  Principal Problem:    New onset of congestive heart failure ([address]  Resolved Problems:    * No resolved hospital problems. *        Assesment:     Paroxysmal supraventricular tachycardia  Acute congestive heart failure, diastolic, new onset  Troponin likely secondary to type II MI  Diabetes mellitus type 2  Essential hypertension  Hyperlipidemia  History of CVA  Cognitive impairment        Plan:     Admit to CVTU  Monitor vitals closely  Keep SPO2 above 90%  I's and O's  IV Hep-Lock  Lasix 40 mg IV  Resume essential home medication  Add parameters  Accu-Cheks before meals and at bedtime  Insulin sliding scale  Hypoglycemia protocol  CBC, BMP  Incentive spirometry  Cardiology consulted in ED  Discussed with cardiology  DVT and GI prophylaxis. Chief Complaint:     Chief Complaint   Patient presents with    Fall     Two falls at group home today    Extremity Weakness     Ambulates with walker but group home concerned pt is unable to get around anymore          History of Present Illness:      Sherwin Mcgregor is a 60 y.o.  female who presents with Fall (Two falls at group home today) and Extremity Weakness (Ambulates with walker but group home concerned pt is unable to get around anymore )    Patient admitted to the emergency room where she presented after a fall. Patient lives at a group home where she had fallen twice at. Patient uses a walker to ambulate and per staff it was difficult for her to ambulate recently. Per staff patient did not fall on her back hips chest or neck. She complained of weakness and was slowly going down onto the floor while holding her walker. Patient has been complaining of progressive dyspnea over the last few days.   Patient has cognitive impairment and is unable to relate a complete history. Much of the information is obtained through ED attending, nursing and the patient's chart. Patient says she does feel better in the last few hours. She denies any chest pain, nausea, vomiting or abdominal pain. Denies diarrhea or dysuria    I have personally reviewed the past medical history, past surgical history, medications, social history, and family history, and summarized in the note. Review of Systems:     All 10 point system is reviewed and negative otherwise mentioned in HPI. Past Medical History:     Past Medical History:   Diagnosis Date    Cerebral artery occlusion with cerebral infarction ([facility]     Diabetes mellitus ([facility] [facility]     HTN (hypertension)     Mental disability         Past Surgical History:     History reviewed. No pertinent surgical history. Medications Prior to Admission:       Prior to Admission medications    Medication Sig Start Date End Date Taking?  Authorizing Provider   apixaban (ELIQUIS) 5 MG TABS tablet Take 5 mg by mouth 2 times daily 1/29/21   Historical Provider, MD   atorvastatin (LIPITOR) 20 MG tablet Take 20 mg by mouth nightly 4/17/21   Historical Provider, MD   cyanocobalamin 1000 MCG tablet Take 1,000 mcg by mouth daily 4/17/21   Historical Provider, MD   fenofibrate (TRICOR) 145 MG tablet 145 mg daily 1/26/23   Historical Provider, MD   glimepiride (AMARYL) 4 MG tablet every morning (before breakfast) 1/26/23   Historical Provider, MD   hydrOXYzine HCl (ATARAX) 25 MG tablet Take 25 mg by mouth nightly as needed 2/4/21   Historical Provider, MD   Icosapent Ethyl (VASCEPA) 1 g CAPS capsule Take 2 g by mouth 2 times daily (with meals)  Patient not taking: Reported on 2/5/2023    Historical Provider, MD   metFORMIN (GLUCOPHAGE) 500 MG tablet 500 mg 2 times daily (with meals) 1/26/23   Historical Provider, MD   insulin aspart (NOVOLOG FLEXPEN) 100 UNIT/ML injection pen Inject into the skin 3 times daily (with meals) 12/3/19 Historical Provider, MD   metoprolol succinate (TOPROL XL) 50 MG extended release tablet 50 mg daily 23   Historical Provider, MD Griselda Cruzwster 25 MG TB24  23   Historical Provider, MD   QUEtiapine (SEROQUEL) 25 MG tablet 25 mg every evening 23   Historical Provider, MD   sertraline (ZOLOFT) 25 MG tablet  23   Historical Provider, MD   spironolactone (ALDACTONE) 25 MG tablet 25 mg daily 23   Historical Provider, MD        Allergies:       Patient has no known allergies. Social History:     Tobacco:    reports that she has never smoked. She has never used smokeless tobacco.  Alcohol:      reports that she does not currently use alcohol. Drug Use:  reports that she does not currently use drugs. Family History:     History reviewed. No pertinent family history.       Physical Exam:     Vitals:  /69   Pulse 86   Temp 98.5 °F (36.9 °C) (Temporal)   Resp 19   Ht 5' 11\" (1.803 m)   Wt 260 lb 6.4 oz (118.1 kg)   SpO2 98%   BMI 36.32 kg/m²   Temp (24hrs), Av.7 °F (36.5 °C), Min:97.1 °F (36.2 °C), Max:98.5 °F (36.9 °C)          General appearance - alert, well appearing, and in no acute distress  Mental status - oriented to person, place, and time with normal affect  Head - normocephalic and atraumatic  Eyes - pupils equal and reactive, extraocular eye movements intact, conjunctiva clear  Ears - hearing appears to be intact  Nose - no drainage noted  Mouth - mucous membranes moist  Neck - supple, no carotid bruits, thyroid not palpable  Chest -coarse crackles bilateral lower zones to auscultation, normal effort  Heart - normal rate, regular rhythm, no murmur  Abdomen - soft, nontender, nondistended, bowel sounds present all four quadrants, no masses, hepatomegaly or splenomegaly  Neurological - normal speech, no focal findings or movement disorder noted, cranial nerves II through XII grossly intact  Extremities -trace edema  Skin - no gross lesions, rashes, or induration noted        Data:     Labs:    Hematology:  Recent Labs     02/04/23 1851 02/05/23  0559   WBC 7.5 6.0   RBC 4.56 4.20   HGB 12.5 11.5*   HCT 39.4 36.9   MCV 86.4 87.9   MCH 27.4 27.4   MCHC 31.7 31.2   RDW 14.5* 14.4   * 133*   MPV 10.9 10.4   INR  --  1.7     Chemistry:  Recent Labs     02/04/23 1851 02/04/23 2033 02/05/23  0559   *  --  136   K 3.6*  --  3.5*     --  102   CO2 16*  --  26   GLUCOSE 274*  --  140*   BUN 26*  --  26*   CREATININE 1.01*  --  1.01*   MG  --   --  1.6   ANIONGAP 14  --  8*   LABGLOM >60  --  >60   CALCIUM 9.0  --  8.7   PROBNP 2,364*  --   --    TROPHS 33* 49*  --    MYOGLOBIN 72* 81*  --      Recent Labs     02/04/23 1851 02/04/23 2228 02/05/23  0751   TSH 4.96  --   --    POCGLU  --  189* 125*       Lab Results   Component Value Date    INR 1.7 02/05/2023    INR 1.1 08/24/2012    PROTIME 19.9 (H) 02/05/2023    PROTIME 11.4 08/24/2012       Lab Results   Component Value Date/Time    SPECIAL NOT REPORTED 08/29/2012 06:42 PM     Lab Results   Component Value Date/Time    CULTURE (A) 08/29/2012 06:41 PM     NO SALMONELLA, SHIGELLA, YERSINIA OR CAMPYLOBACTER ISOLATED    CULTURE NO ESCHERICHIA COLI O157:H7 ISOLATED 08/29/2012 06:41 PM    CULTURE NEGATIVE FOR SHIGATOXIN (A) 08/29/2012 06:41 PM    CULTURE  08/29/2012 06:41 PM     Performed at [address] [phone]       No results found for: POCPH, PHART, PH, POCPCO2, IIQ9HRK, PCO2, POCPO2, PO2ART, PO2, POCHCO3, PRX3TMH, HCO3, NBEA, PBEA, BEART, BE, THGBART, THB, RDQ3GJG, QCWC0QLO, A2NQBZCL, O2SAT, FIO2    Radiology:    XR RADIUS ULNA LEFT (2 VIEWS)    Result Date: 2/2/2023  No acute osseous abnormality. XR CHEST PORTABLE    Result Date: 2/4/2023  Findings favoring moderate CHF pattern pulmonary vascular congestion. Difficult to exclude superimposed infection. Attention on follow-up. XR CHEST PORTABLE    Result Date: 2/2/2023  No acute process.          All radiological studies reviewed                Code Status:  Full Code    Electronically signed by Sadia Campos MD on 2/5/2023 at 11:09 AM     Copy sent to Dr. Jacob Downs MD    This note was created with the assistance of a speech-recognition program.  Although the intention is to generate a document that actually reflects the content of the visit, no guarantees can be provided that every mistake has been identified and corrected by editing. Note was updated later by me after  physical examination and  completion of the assessment.

## 2023-02-05 NOTE — PROGRESS NOTES
End Of Shift Note  [facility]  Summary of shift: Pt had a relatively good night and slept most of the night once her visitor left last night. See previous progress notes regarding a behavioral situation witnessed. Please note that output below does not include the saturated brief x 2. Vitals:    Vitals:    02/04/23 2130 02/04/23 2145 02/05/23 0000 02/05/23 0400   BP: 95/65 106/68 103/67 109/69   Pulse: 90 88 81 83   Resp: 21 20 18 19   Temp:   97.1 °F (36.2 °C)    TempSrc:   Temporal    SpO2: 94% 95% 98%    Weight:  260 lb 6.4 oz (118.1 kg)     Height:  5' 11\" (1.803 m)          I&O:   Intake/Output Summary (Last 24 hours) at 2/5/2023 0546  Last data filed at 2/5/2023 0544  Gross per 24 hour   Intake 120 ml   Output 400 ml   Net -280 ml       Resp Status: Pt required 2L NC when in deep sleep. Noted pt to have multiple apneic events when sleeping.     Critical Care IV infusions:   sodium chloride          LDA:   Peripheral IV 02/04/23 Right Forearm (Active)   Number of days: 0

## 2023-02-05 NOTE — CONSULTS
[address]  [phone]               Cardiology Consult           Date of Admission:  2/4/2023  Date of Consultation:  2/5/2023      PCP:  Roxanne Baez MD      Chief Complaint: CHF    History of Present Illness:  Lucy Gutierrez is a 60 y.o. female who presents with  past medical history significant for developmental delay type 2 diabetes mellitus hyperlipidemia prior stroke including bilateral watershed infarcts secondary to bilateral severe ICA stenosis which was thought to be non operative given severe debility. She also has history of splenic infarct and has been maintained on Eliquis. She presents with mental status changes and shortness of breath. She is unable to give me much in the way of history. The history has been gleaned from electronic medical records and sister at the bedside. PMH:   has a past medical history of Cerebral artery occlusion with cerebral infarction ([facility] [address] Diabetes mellitus ([facility] [address] [facility] HTN (hypertension), and Mental disability. PSH:   has no past surgical history on file. Allergies:  No Known Allergies     Home Meds:    Prior to Admission medications    Medication Sig Start Date End Date Taking?  Authorizing Provider   methenamine (HIPREX) 1 g tablet Take 1 g by mouth 2 times daily (with meals)   Yes Historical Provider, MD   apixaban (ELIQUIS) 5 MG TABS tablet Take 5 mg by mouth 2 times daily 1/29/21   Historical Provider, MD   atorvastatin (LIPITOR) 20 MG tablet Take 20 mg by mouth nightly 4/17/21   Historical Provider, MD   cyanocobalamin 1000 MCG tablet Take 1,000 mcg by mouth daily 4/17/21   Historical Provider, MD   fenofibrate (TRICOR) 145 MG tablet 145 mg daily 1/26/23   Historical Provider, MD   glimepiride (AMARYL) 4 MG tablet 4 mg with breakfast and with evening meal 1/26/23   Historical Provider, MD   hydrOXYzine HCl (ATARAX) 25 MG tablet Take 25 mg by mouth nightly as needed 2/4/21   Historical Provider, MD   Icosapent Ethyl (VASCEPA) 1 g CAPS capsule Take 2 g by mouth 2 times daily (with meals)  Patient not taking: No sig reported    Historical Provider, MD   metFORMIN (GLUCOPHAGE) 500 MG tablet 500 mg 2 times daily (with meals) 1/26/23   Historical Provider, MD   insulin aspart (NOVOLOG FLEXPEN) 100 UNIT/ML injection pen Inject into the skin 3 times daily (with meals) 12/3/19   Historical Provider, MD   metoprolol succinate (TOPROL XL) 50 MG extended release tablet 50 mg daily 1/26/23   Historical Provider, MD   MYRBETRIQ 25 MG TB24 25 mg daily 1/26/23   Historical Provider, MD   QUEtiapine (SEROQUEL) 25 MG tablet 25 mg every evening 1/26/23   Historical Provider, MD   sertraline (ZOLOFT) 25 MG tablet  1/26/23   Historical Provider, MD   spironolactone (ALDACTONE) 25 MG tablet 25 mg daily 1/26/23   Historical Provider, MD        Hospital Meds:    Current Facility-Administered Medications   Medication Dose Route Frequency Provider Last Rate Last Admin    apixaban (ELIQUIS) tablet 5 mg  5 mg Oral BID Quentin Bustos MD   5 mg at 02/05/23 1157    atorvastatin (LIPITOR) tablet 20 mg  20 mg Oral Nightly Quentin Bustos MD        hydrOXYzine HCl (ATARAX) tablet 25 mg  25 mg Oral Nightly PRN Quentin Bustos MD        metoprolol succinate (TOPROL XL) extended release tablet 50 mg  50 mg Oral Daily Quentin Busots MD   50 mg at 02/05/23 1157    QUEtiapine (SEROQUEL) tablet 25 mg  25 mg Oral Nightly Deng Mccormick MD        sertraline (ZOLOFT) tablet 25 mg  25 mg Oral Daily Quentin Bustos MD   25 mg at 02/05/23 1156    spironolactone (ALDACTONE) tablet 25 mg  25 mg Oral Daily Deng Mccormick MD   25 mg at 02/05/23 1156    fenofibrate (TRICOR) tablet 54 mg  54 mg Oral Daily Quentin Bustos MD   54 mg at 02/05/23 1257    [START ON 2/6/2023] glipiZIDE (GLUCOTROL) tablet 2.5 mg  2.5 mg Oral QAM AC Quentin Bustos MD        omega-3 acid ethyl esters (LOVAZA) capsule 1 g  1 g Oral BID Deng Mccormick MD        insulin lispro (HUMALOG) injection vial 0-8 Units  0-8 Units SubCUTAneous TID WC Quentin Bustos MD        insulin lispro (HUMALOG) injection vial 0-4 Units  0-4 Units SubCUTAneous Nightly Quentin Bustos MD        glucose chewable tablet 16 g  4 tablet Oral PRN Quentin Bustos MD        dextrose bolus 10% 125 mL  125 mL IntraVENous PRN Mitali Landis MD        Or    dextrose bolus 10% 250 mL  250 mL IntraVENous PRN Mitali Landis MD        glucagon (rDNA) injection 1 mg  1 mg SubCUTAneous PRN Mitali Landis MD        dextrose 10 % infusion   IntraVENous Continuous PRN Quentin Bustos MD        adenosine (ADENOCARD) injection 6 mg  6 mg IntraVENous Once Bertram Mayer MD        sodium chloride flush 0.9 % injection 5-40 mL  5-40 mL IntraVENous 2 times per day Noel Pablo Lump, APRN - CNP   10 mL at 02/05/23 0846    sodium chloride flush 0.9 % injection 10 mL  10 mL IntraVENous PRN Mitra A Lump, APRN - CNP        0.9 % sodium chloride infusion   IntraVENous PRN Mitra A Lump, APRN - CNP        potassium chloride (KLOR-CON M) extended release tablet 40 mEq  40 mEq Oral PRN Noel Pablo Lump, APRN - CNP   40 mEq at 02/05/23 0840    Or    potassium bicarb-citric acid (EFFER-K) effervescent tablet 40 mEq  40 mEq Oral PRN Mitra A Lump, APRN - CNP        Or    potassium chloride 10 mEq/100 mL IVPB (Peripheral Line)  10 mEq IntraVENous PRN Mitra A Lump, APRN - CNP        magnesium sulfate 1000 mg in dextrose 5% 100 mL IVPB  1,000 mg IntraVENous PRN Mitra A Lump, APRN - CNP        ondansetron (ZOFRAN-ODT) disintegrating tablet 4 mg  4 mg Oral Q8H PRN Mitra A Lump, APRN - CNP        Or    ondansetron (ZOFRAN) injection 4 mg  4 mg IntraVENous Q6H PRN Mitra A Lump, APRN - CNP        polyethylene glycol (GLYCOLAX) packet 17 g  17 g Oral Daily PRN Mitra A Lump, APRN - CNP        acetaminophen (TYLENOL) tablet 650 mg  650 mg Oral Q6H PRN Mitra A Lump, APRN - CNP   650 mg at 02/05/23 0840    Or    acetaminophen (TYLENOL) suppository 650 mg  650 mg Rectal Q6H PRN Marquise MORGAN Lump, APRN - CNP        furosemide (LASIX) injection 40 mg  40 mg IntraVENous Daily Mitra Kevin, APRN - CNP   40 mg at 02/05/23 0840       Social History:       TOBACCO:   reports that she has never smoked. She has never used smokeless tobacco.  ETOH:   reports that she does not currently use alcohol. DRUGS:  reports that she does not currently use drugs. OCCUPATION:          Family Histroy:     History reviewed. No pertinent family history. Review of Systems:     Unobtainable       Physical Exam    Vital Signs: /63   Pulse 89   Temp 97.5 °F (36.4 °C) (Temporal)   Resp 20   Ht 5' 11\" (1.803 m)   Wt 260 lb 6.4 oz (118.1 kg)   SpO2 94%   BMI 36.32 kg/m²  O2 Flow Rate (L/min): 2 L/min     Admission Weight: 260 lb 6.4 oz (118.1 kg)     General appearance: Awake, Alert Cooperative    Head: Normocephalic, without obvious abnormality, atraumatic    Eyes: Conjunctivae/corneas clear. PERRL, EOM's intact. Fundi benign    Neck: no adenopathy, no carotid bruit, no JVD, supple, symmetrical, trachea midline and thyroid: not enlarged, symmetric, no tenderness/mass/nodules    Lungs: clear to auscultation bilaterally    Heart: regular rate and rhythm, S1, S2 normal, no murmur, click, rub or gallop    Abdomen: Soft, non-tender. Bowel sounds normal. No masses,  no organomegaly    Extremities: extremities normal, atraumatic, no cyanosis or edema    Skin: Skin color, texture, turgor normal. No rashes or lesions    Neurologic: Grossly normal            Labs:      CBC:   Recent Labs     02/04/23 1851 02/05/23  0559   WBC 7.5 6.0   HGB 12.5 11.5*   HCT 39.4 36.9   MCV 86.4 87.9   * 133*     BMP:   Recent Labs     02/04/23  1851 02/05/23  0559   * 136   K 3.6* 3.5*    102   CO2 16* 26   BUN 26* 26*   CREATININE 1.01* 1.01*     PT/INR:   Recent Labs     02/05/23  0559   PROTIME 19.9*   INR 1.7     APTT: No results for input(s): APTT in the last 72 hours.   MAG:   Recent Labs     02/05/23  0559   MG 1.6     D Dimer: No results for input(s): DDIMER in the last 72 hours. Troponin T   Recent Labs     02/04/23  1851 02/04/23 2033   TROPHS 33* 49*     ProBNP Invalid input(s): PRO-BNP    XR RADIUS ULNA LEFT (2 VIEWS)    Result Date: 2/2/2023  No acute osseous abnormality. XR CHEST PORTABLE    Result Date: 2/4/2023  Findings favoring moderate CHF pattern pulmonary vascular congestion. Difficult to exclude superimposed infection. Attention on follow-up. XR CHEST PORTABLE    Result Date: 2/2/2023  No acute process. Echocardiogram 2021  Interpretation Summary         Left Ventricle: Systolic function is normal with an ejection fraction of 60-65%. Mitral Valve: There is trace regurgitation. There is no significant mitral valve stenosis visually but mean MV gradient is 6 mmHg. Small pericardial effusion. Epicardial fat is present. Image quality is suboptimal.  Consider RAJESH if relevant  MV is suboptimally visualized. Cath 2019  FINAL IMPRESSION   1. Angiographically normal epicardial coronary arteries. 2.  Normal left ventricular systolic function. 3.  Mildly elevated left ventricular end-diastolic pressure. RECOMMENDATIONS:   1. The patient be treated with medical therapy. ADDITIONAL FINDINGS   HEMODYNAMICS   /65 (85)   /11-19     Coronary Findings Diagnostic Dominance: Co-dominant   Left Main: Angiographically normal.   Left Anterior Descending: Angiographically normal.   Left Circumflex: Angiographically normal.   Right Coronary Artery: Angiographically normal.     Intervention   No interventions have been documented. Left Ventricle   The ejection fraction is 60%. Wall motion is normal. There is 2+ mitral valve regurgitation. Diagnosis:  Principal Problem:    New onset of congestive heart failure ([address]  Resolved Problems:    * No resolved hospital problems.  *          Plan:     Acute heart failure most recent ejection fraction showed preserved ejection fraction. Will need to update echocardiogram.  Continue with IV diuresis. Guideline directed medical therapy based on echo findings. Slightly elevated HS troponin, not ACS. Likely type two MI. Normal cors 2019.       Electronically signed by Justin Rosa MD on 2/5/2023 at 2:32 PM

## 2023-02-05 NOTE — PROGRESS NOTES
Physical Therapy  Facility/Department: [facility]  Physical Therapy Initial Assessment    Name: Loren Brewer  : 1960  MRN: [id number]  Date of Service: 2023    Discharge Recommendations:  Patient would benefit from continued therapy after discharge          Patient Diagnosis(es): The primary encounter diagnosis was Paroxysmal supra[facility]ricular tachy[facility] ([facility] A diagnosis of Acute congestive heart failure, unspecified heart failure type [facility]) was also pertinent to this visit. Past Medical History:  has a past medical history of Cerebral artery occlusion with cerebral infarction ([facility] Diabetes mellitus ([facility] HTN (hypertension), and Mental disability. Past Surgical History:  has no past surgical history on file. Assessment   Body Structures, Functions, Activity Limitations Requiring Skilled Therapeutic Intervention: Decreased functional mobility ; Decreased strength;Decreased endurance;Decreased sensation;Decreased balance;Decreased posture  Assessment: Pt limited by poor activity tolerance  Therapy Prognosis: Good  Decision Making: High Complexity  Requires PT Follow-Up: Yes  Activity Tolerance  Activity Tolerance: Patient limited by endurance; Patient limited by fatigue     Plan   Physcial Therapy Plan  General Plan: 5-7 times per week  Current Treatment Recommendations: Strengthening, Balance training, Functional mobility training, Transfer training, Gait training, Endurance training (Posture)  Safety Devices  Type of Devices: Gait belt, Left in bed, Call light within reach, Bed alarm in place, Nurse notified  Restraints  Restraints Initially in Place: No     Restrictions  Restrictions/Precautions  Restrictions/Precautions: Fall Risk, General Precautions  Required Braces or Orthoses?: No     Subjective   Pain: Denies pain  General  Chart Reviewed: Yes  Patient assessed for rehabilitation services?: Yes  Response To Previous Treatment: Not applicable  Family / Caregiver Present: Yes (Sister)  Follows Commands: Within Functional Limits  General Comment  Comments: OK for PT per Praveen Muse RN         Social/Functional History  Social/Functional History  Lives With: Family (Brother)  Type of Home: House  Home Layout: One level  Home Access: Stairs to enter with rails  Entrance Stairs - Number of Steps: 2  Entrance Stairs - Rails: Left  Bathroom Shower/Tub: Tub/Shower unit  Bathroom Toilet: Standard  Bathroom Equipment: Grab bars in shower, Shower chair, Toilet raiser  Bathroom Accessibility: Accessible  Home Equipment: Tram Krish, 4 wheeled, Walker, rolling  Has the patient had two or more falls in the past year or any fall with injury in the past year?: Yes  Receives Help From: Family  ADL Assistance: Needs assistance  Homemaking Responsibilities: No  Ambulation Assistance: Independent (w/ RW)  Transfer Assistance: Independent  Active : No  Mode of Transportation: Family  Vision/Hearing  Vision  Vision: Impaired  Vision Exceptions: Wears glasses at all times  Hearing  Hearing: Within functional limits    Cognition   Orientation  Overall Orientation Status: Within Functional Limits  Cognition  Overall Cognitive Status: Exceptions  Arousal/Alertness: Delayed responses to stimuli  Following Commands: Follows one step commands with increased time  Attention Span: Attends with cues to redirect  Safety Judgement: Decreased awareness of need for assistance  Problem Solving: Assistance required to generate solutions;Assistance required to implement solutions  Insights: Decreased awareness of deficits  Initiation: Requires cues for some  Sequencing: Requires cues for some     Objective   Heart Rate: 84  Heart Rate Source: Monitor  BP: 98/60  BP Location: Left upper arm  BP Method: Automatic  Patient Position: Semi fowlers  MAP (Calculated): 73  Resp: 22  SpO2: 94 %  O2 Device: None (Room air)     Observation/Palpation  Posture: Fair  Gross Assessment  AROM: Within functional limits  Strength:  Within functional limits  Coordination: Within functional limits  Tone: Normal  Sensation: Impaired (B feet neuropathy)        Strength RLE  Strength RLE: WFL  Comment: B LE's 4+/5, hips 4/5  Strength LLE  Strength LLE: WFL  Strength RUE  Comment: See OT monique for B UE MMT        Balance  Sitting: Intact  Standing: With support  Bed mobility  Rolling to Left: Supervision  Rolling to Right: Supervision  Supine to Sit: Minimal assistance  Sit to Supine: Minimal assistance  Scooting: Moderate assistance  Transfers  Sit to Stand: Minimal Assistance  Stand to Sit: Minimal Assistance  Stand Pivot Transfers: Minimal Assistance  Ambulation  Surface: Level tile  Device: Rolling Walker  Assistance: Minimal assistance  Distance: 15' x 1  Comments: Back to chair     Balance  Posture: Fair  Sitting - Static: Good  Sitting - Dynamic: Good  Standing - Static: Fair;+  Standing - Dynamic: Fair           OutComes Score                                                  AM-PAC Score  AM-PAC Inpatient Mobility Raw Score : 15 (02/05/23 1543)  AM-PAC Inpatient T-Scale Score : 39.45 (02/05/23 1543)  Mobility Inpatient CMS 0-100% Score: 57.7 (02/05/23 1543)  Mobility Inpatient CMS G-Code Modifier : CK (02/05/23 1543)          Tinneti Score       Goals  Short Term Goals  Time Frame for Short Term Goals: 12 treatments  Short Term Goal 1: Independent bed mobility/transfers  Short Term Goal 2: SBA ambulation w/ ' x 1  Short Term Goal 3: Good balance/posture  Short Term Goal 4: 5/5 B LE's  Short Term Goal 5: Tolerate 30 min ther act  Patient Goals   Patient Goals : Per family, feel better       Education  Patient Education  Education Given To: Patient; Family  Education Provided: Role of Therapy;Plan of Care;Home Exercise Program;Fall Prevention Strategies  Education Provided Comments: Handouts: ankle pumps, LE HEP,fall prevention  Education Method: Demonstration;Verbal;Printed Information/Hand-outs  Barriers to Learning: Cognition  Education Outcome: Verbalized understanding;Continued education needed      Therapy Time   Individual Concurrent Group Co-treatment   Time In 4200 (Treatment time 25 minutes)         Time Out 1433         Minutes Benito Rahman

## 2023-02-06 LAB
ABSOLUTE EOS #: 0.05 K/UL (ref 0–0.44)
ABSOLUTE IMMATURE GRANULOCYTE: 0.03 K/UL (ref 0–0.3)
ABSOLUTE LYMPH #: 1.36 K/UL (ref 1.1–3.7)
ABSOLUTE MONO #: 0.58 K/UL (ref 0.1–1.2)
ANION GAP SERPL CALCULATED.3IONS-SCNC: 9 MMOL/L (ref 9–17)
BASOPHILS # BLD: 1 % (ref 0–2)
BASOPHILS ABSOLUTE: 0.03 K/UL (ref 0–0.2)
BNP SERPL-MCNC: 1357 PG/ML
BUN SERPL-MCNC: 23 MG/DL (ref 8–23)
BUN/CREAT BLD: 25 (ref 9–20)
CALCIUM SERPL-MCNC: 8.4 MG/DL (ref 8.6–10.4)
CHLORIDE SERPL-SCNC: 101 MMOL/L (ref 98–107)
CO2 SERPL-SCNC: 24 MMOL/L (ref 20–31)
CREAT SERPL-MCNC: 0.92 MG/DL (ref 0.5–0.9)
EKG ATRIAL RATE: 78 BPM
EKG P AXIS: 70 DEGREES
EKG P-R INTERVAL: 176 MS
EKG Q-T INTERVAL: 434 MS
EKG QRS DURATION: 74 MS
EKG QTC CALCULATION (BAZETT): 494 MS
EKG R AXIS: -54 DEGREES
EKG T AXIS: 65 DEGREES
EKG VENTRICULAR RATE: 78 BPM
EOSINOPHILS RELATIVE PERCENT: 1 % (ref 1–4)
GFR SERPL CREATININE-BSD FRML MDRD: >60 ML/MIN/1.73M2
GLUCOSE BLD-MCNC: 132 MG/DL (ref 65–105)
GLUCOSE BLD-MCNC: 179 MG/DL (ref 65–105)
GLUCOSE BLD-MCNC: 187 MG/DL (ref 65–105)
GLUCOSE BLD-MCNC: 189 MG/DL (ref 65–105)
GLUCOSE SERPL-MCNC: 112 MG/DL (ref 70–99)
HCT VFR BLD AUTO: 35.8 % (ref 36.3–47.1)
HGB BLD-MCNC: 11.1 G/DL (ref 11.9–15.1)
IMMATURE GRANULOCYTES: 1 %
LV EF: 65 %
LVEF MODALITY: NORMAL
LYMPHOCYTES # BLD: 26 % (ref 24–43)
MAGNESIUM SERPL-MCNC: 1.6 MG/DL (ref 1.6–2.6)
MCH RBC QN AUTO: 27.3 PG (ref 25.2–33.5)
MCHC RBC AUTO-ENTMCNC: 31 G/DL (ref 28.4–34.8)
MCV RBC AUTO: 88 FL (ref 82.6–102.9)
MONOCYTES # BLD: 11 % (ref 3–12)
NRBC AUTOMATED: 0 PER 100 WBC
PDW BLD-RTO: 14.5 % (ref 11.8–14.4)
PLATELET # BLD AUTO: 133 K/UL (ref 138–453)
PMV BLD AUTO: 10.6 FL (ref 8.1–13.5)
POTASSIUM SERPL-SCNC: 3.4 MMOL/L (ref 3.7–5.3)
RBC # BLD: 4.07 M/UL (ref 3.95–5.11)
RBC # BLD: ABNORMAL 10*6/UL
SEG NEUTROPHILS: 62 % (ref 36–65)
SEGMENTED NEUTROPHILS ABSOLUTE COUNT: 3.29 K/UL (ref 1.5–8.1)
SODIUM SERPL-SCNC: 134 MMOL/L (ref 135–144)
WBC # BLD AUTO: 5.3 K/UL (ref 3.5–11.3)

## 2023-02-06 PROCEDURE — 94761 N-INVAS EAR/PLS OXIMETRY MLT: CPT

## 2023-02-06 PROCEDURE — 80048 BASIC METABOLIC PNL TOTAL CA: CPT

## 2023-02-06 PROCEDURE — 83880 ASSAY OF NATRIURETIC PEPTIDE: CPT

## 2023-02-06 PROCEDURE — 93005 ELECTROCARDIOGRAM TRACING: CPT | Performed by: FAMILY MEDICINE

## 2023-02-06 PROCEDURE — 93306 TTE W/DOPPLER COMPLETE: CPT

## 2023-02-06 PROCEDURE — 36415 COLL VENOUS BLD VENIPUNCTURE: CPT

## 2023-02-06 PROCEDURE — 83735 ASSAY OF MAGNESIUM: CPT

## 2023-02-06 PROCEDURE — 2580000003 HC RX 258: Performed by: NURSE PRACTITIONER

## 2023-02-06 PROCEDURE — 2060000000 HC ICU INTERMEDIATE R&B

## 2023-02-06 PROCEDURE — 6370000000 HC RX 637 (ALT 250 FOR IP): Performed by: NURSE PRACTITIONER

## 2023-02-06 PROCEDURE — 82947 ASSAY GLUCOSE BLOOD QUANT: CPT

## 2023-02-06 PROCEDURE — 85025 COMPLETE CBC W/AUTO DIFF WBC: CPT

## 2023-02-06 PROCEDURE — 6360000002 HC RX W HCPCS: Performed by: NURSE PRACTITIONER

## 2023-02-06 PROCEDURE — 6370000000 HC RX 637 (ALT 250 FOR IP): Performed by: FAMILY MEDICINE

## 2023-02-06 RX ADMIN — ONDANSETRON 4 MG: 4 TABLET, ORALLY DISINTEGRATING ORAL at 16:14

## 2023-02-06 RX ADMIN — SPIRONOLACTONE 25 MG: 25 TABLET ORAL at 07:50

## 2023-02-06 RX ADMIN — POTASSIUM CHLORIDE 40 MEQ: 1500 TABLET, EXTENDED RELEASE ORAL at 07:49

## 2023-02-06 RX ADMIN — QUETIAPINE FUMARATE 25 MG: 25 TABLET ORAL at 22:10

## 2023-02-06 RX ADMIN — APIXABAN 5 MG: 5 TABLET, FILM COATED ORAL at 22:10

## 2023-02-06 RX ADMIN — ATORVASTATIN CALCIUM 20 MG: 20 TABLET, FILM COATED ORAL at 22:10

## 2023-02-06 RX ADMIN — FUROSEMIDE 40 MG: 10 INJECTION, SOLUTION INTRAMUSCULAR; INTRAVENOUS at 07:52

## 2023-02-06 RX ADMIN — ACETAMINOPHEN 650 MG: 325 TABLET ORAL at 05:44

## 2023-02-06 RX ADMIN — APIXABAN 5 MG: 5 TABLET, FILM COATED ORAL at 07:51

## 2023-02-06 RX ADMIN — FENOFIBRATE 54 MG: 54 TABLET ORAL at 07:51

## 2023-02-06 RX ADMIN — GLIPIZIDE 2.5 MG: 5 TABLET ORAL at 05:44

## 2023-02-06 RX ADMIN — ACETAMINOPHEN 650 MG: 325 TABLET ORAL at 12:22

## 2023-02-06 RX ADMIN — POTASSIUM CHLORIDE 40 MEQ: 1500 TABLET, EXTENDED RELEASE ORAL at 05:46

## 2023-02-06 RX ADMIN — SODIUM CHLORIDE, PRESERVATIVE FREE 10 ML: 5 INJECTION INTRAVENOUS at 22:12

## 2023-02-06 RX ADMIN — SERTRALINE 25 MG: 50 TABLET, FILM COATED ORAL at 07:51

## 2023-02-06 RX ADMIN — OMEGA-3-ACID ETHYL ESTERS 1 G: 1 CAPSULE, LIQUID FILLED ORAL at 22:10

## 2023-02-06 RX ADMIN — METOPROLOL SUCCINATE 50 MG: 50 TABLET, EXTENDED RELEASE ORAL at 07:51

## 2023-02-06 RX ADMIN — SODIUM CHLORIDE, PRESERVATIVE FREE 10 ML: 5 INJECTION INTRAVENOUS at 07:52

## 2023-02-06 RX ADMIN — OMEGA-3-ACID ETHYL ESTERS 1 G: 1 CAPSULE, LIQUID FILLED ORAL at 07:51

## 2023-02-06 ASSESSMENT — PAIN DESCRIPTION - DESCRIPTORS
DESCRIPTORS: ACHING
DESCRIPTORS: ACHING

## 2023-02-06 ASSESSMENT — PAIN DESCRIPTION - LOCATION
LOCATION: HEAD
LOCATION: HEAD

## 2023-02-06 NOTE — PROGRESS NOTES
End Of Shift Note  [address] CVICU  Summary of shift: Patient is calm, cooperative. She is scheduled to have an echocardiogram today and then possibly be discharged if results are okay. Patient has maintained SR throughout the shift.      Vitals:    Vitals:    02/05/23 2000 02/06/23 0000 02/06/23 0104 02/06/23 0400   BP: (!) 110/57 117/67  114/65   Pulse: 82 79 81 79   Resp: 19 16 13 19   Temp: 97.4 °F (36.3 °C) 97.3 °F (36.3 °C)  97 °F (36.1 °C)   TempSrc: Temporal Temporal  Temporal   SpO2: 95%  96% 96%   Weight:    261 lb (118.4 kg)   Height:            I&O:      Intake/Output Summary (Last 24 hours) at 2/6/2023 0506  Last data filed at 2/5/2023 2334  Gross per 24 hour   Intake 1940 ml   Output 2050 ml   Net -110 ml       Resp Status: Room air    Critical Care IV infusions:  None    LDA:   Peripheral IV 02/04/23 Right Forearm (Active)   Number of days: 1       External Urinary Catheter (Active)   Number of days: 1

## 2023-02-06 NOTE — PROGRESS NOTES
[address] Hospitalist        2/6/2023   3:09 PM    Name:  Jim Michelle  MRN:    [id number]     Acct:     [de-identified]   Room:  2035/2035-01  IP Day: 2     Admit Date: 2/4/2023  6:38 PM  PCP: Antonino Hein MD    C/C:   Chief Complaint   Patient presents with    Fall     Two falls at group home today    Extremity Weakness     Ambulates with walker but group home concerned pt is unable to get around anymore        Assessment:        Acute diastolic congestive heart failure  Elevated troponin  Paroxysmal supraventricular tachycardia  Hypertension  Hyperlipidemia  Diabetes type 2  History of CVA      Plan:        Patient is currently in CDU  O2 maintain oxygen saturation greater than 92%    Monitor respiratory status  Lasix 40 mg IV daily  Echocardiogram  Serial troponin  IV Lopressor as needed  Cardiology consult      DVT and GI prophylaxis  Continue to monitor/telemetry/CBC with differential daily/BMP daily  Continue medications as below    Scheduled Meds:   apixaban  5 mg Oral BID    atorvastatin  20 mg Oral Nightly    metoprolol succinate  50 mg Oral Daily    QUEtiapine  25 mg Oral Nightly    sertraline  25 mg Oral Daily    spironolactone  25 mg Oral Daily    fenofibrate  54 mg Oral Daily    glipiZIDE  2.5 mg Oral QAM AC    omega-3 acid ethyl esters  1 g Oral BID    insulin lispro  0-8 Units SubCUTAneous TID WC    insulin lispro  0-4 Units SubCUTAneous Nightly    adenosine  6 mg IntraVENous Once    sodium chloride flush  5-40 mL IntraVENous 2 times per day    furosemide  40 mg IntraVENous Daily     Continuous Infusions:   dextrose      sodium chloride       PRN Meds:  hydrOXYzine HCl, 25 mg, Nightly PRN  glucose, 4 tablet, PRN  dextrose bolus, 125 mL, PRN   Or  dextrose bolus, 250 mL, PRN  glucagon (rDNA), 1 mg, PRN  dextrose, , Continuous PRN  sodium chloride flush, 10 mL, PRN  sodium chloride, , PRN  potassium chloride, 40 mEq, PRN   Or  potassium alternative oral replacement, 40 mEq, PRN   Or  potassium chloride, 10 mEq, PRN  magnesium sulfate, 1,000 mg, PRN  ondansetron, 4 mg, Q8H PRN   Or  ondansetron, 4 mg, Q6H PRN  polyethylene glycol, 17 g, Daily PRN  acetaminophen, 650 mg, Q6H PRN   Or  acetaminophen, 650 mg, Q6H PRN            Subjective:     Patient seen and examined at bedside. No overnight events. No acute complaints today. Afebrile  Pt. Denies any CP, SOB, palpitation, HA, dizziness, chills, cough, cold, changes in urination, BM or skin changes or any pain. ROS:  A 10 point system reviewed and negative otherwise mentioned above. Physical Examination:      Vitals:  /68   Pulse 84   Temp 97.7 °F (36.5 °C) (Temporal)   Resp 21   Ht 5' 11\" (1.803 m)   Wt 261 lb (118.4 kg)   SpO2 98%   BMI 36.40 kg/m²   Temp (24hrs), Av.4 °F (36.3 °C), Min:97 °F (36.1 °C), Max:97.7 °F (36.5 °C)    Weight:   Wt Readings from Last 3 Encounters:   23 261 lb (118.4 kg)   23 200 lb (90.7 kg)     I/O last 3 completed shifts:  I/O last 3 completed shifts:   In:  [P.O.:]  Out: 2300 [Urine:2300]     Recent Labs     23  1945 23  2319 23  0803 23  1204   POCGLU 177* 101 132* 179*         General appearance - alert, well appearing, and in no acute distress  Mental status - oriented to person, place, and time with normal affect  Head - normocephalic and atraumatic  Eyes - pupils equal and reactive, extraocular eye movements intact, conjunctiva clear  Ears - hearing appears to be intact  Nose - no drainage noted  Mouth - mucous membranes moist  Neck - supple, no carotid bruits, thyroid not palpable  Chest - clear to auscultation, normal effort  Heart - normal rate, regular rhythm, no murmur  Abdomen - soft, nontender, nondistended, bowel sounds present all four quadrants, no masses, hepatomegaly or splenomegaly  Neurological - normal speech, no focal findings or movement disorder noted, cranial nerves II through XII grossly intact  Extremities - peripheral pulses palpable, no pedal edema or calf pain with palpation  Skin - no gross lesions, rashes, or induration noted        Medications:      Allergies: No Known Allergies    Current Meds:   Current Facility-Administered Medications:     apixaban (ELIQUIS) tablet 5 mg, 5 mg, Oral, BID, Quentin Bustos MD, 5 mg at 02/06/23 0751    atorvastatin (LIPITOR) tablet 20 mg, 20 mg, Oral, Nightly, Quentin Bustos MD, 20 mg at 02/05/23 2005    hydrOXYzine HCl (ATARAX) tablet 25 mg, 25 mg, Oral, Nightly PRN, Quentin Butsos MD    metoprolol succinate (TOPROL XL) extended release tablet 50 mg, 50 mg, Oral, Daily, Quentin Bustos MD, 50 mg at 02/06/23 0751    QUEtiapine (SEROQUEL) tablet 25 mg, 25 mg, Oral, Nightly, Quentin Bustos MD, 25 mg at 02/05/23 2005    sertraline (ZOLOFT) tablet 25 mg, 25 mg, Oral, Daily, Quentin Bustos MD, 25 mg at 02/06/23 0751    spironolactone (ALDACTONE) tablet 25 mg, 25 mg, Oral, Daily, Quentin Bustos MD, 25 mg at 02/06/23 0750    fenofibrate (TRICOR) tablet 54 mg, 54 mg, Oral, Daily, Quentin Bustos MD, 54 mg at 02/06/23 0751    glipiZIDE (GLUCOTROL) tablet 2.5 mg, 2.5 mg, Oral, QAM AC, Quentin Bustos MD, 2.5 mg at 02/06/23 0544    omega-3 acid ethyl esters (LOVAZA) capsule 1 g, 1 g, Oral, BID, Quentin Bustos MD, 1 g at 02/06/23 0751    insulin lispro (HUMALOG) injection vial 0-8 Units, 0-8 Units, SubCUTAneous, TID WC, Quentin Bustos MD    insulin lispro (HUMALOG) injection vial 0-4 Units, 0-4 Units, SubCUTAneous, Nightly, Quentin Bustos MD    glucose chewable tablet 16 g, 4 tablet, Oral, PRN, Quentin Bustos MD    dextrose bolus 10% 125 mL, 125 mL, IntraVENous, PRN **OR** dextrose bolus 10% 250 mL, 250 mL, IntraVENous, PRN, Quentin Bustos MD    glucagon (rDNA) injection 1 mg, 1 mg, SubCUTAneous, PRN, Quentin Bustos MD    dextrose 10 % infusion, , IntraVENous, Continuous PRN, Quentin Bustos MD    adenosine (ADENOCARD) injection 6 mg, 6 mg, IntraVENous, Once, Bertram Mayer MD    sodium chloride flush 0.9 % injection 5-40 mL, 5-40 mL, IntraVENous, 2 times per day, Mitra A Lump, APRN - CNP, 10 mL at 02/06/23 0752    sodium chloride flush 0.9 % injection 10 mL, 10 mL, IntraVENous, PRN, Mitra A Lump, APRN - CNP    0.9 % sodium chloride infusion, , IntraVENous, PRN, [place] Duffel Lump, APRN - CNP    potassium chloride (KLOR-CON M) extended release tablet 40 mEq, 40 mEq, Oral, PRN, 40 mEq at 02/06/23 0749 **OR** potassium bicarb-citric acid (EFFER-K) effervescent tablet 40 mEq, 40 mEq, Oral, PRN **OR** potassium chloride 10 mEq/100 mL IVPB (Peripheral Line), 10 mEq, IntraVENous, PRN, Mitra A Lump, APRN - CNP    magnesium sulfate 1000 mg in dextrose 5% 100 mL IVPB, 1,000 mg, IntraVENous, PRN, Mitra A Lump, APRN - CNP    ondansetron (ZOFRAN-ODT) disintegrating tablet 4 mg, 4 mg, Oral, Q8H PRN **OR** ondansetron (ZOFRAN) injection 4 mg, 4 mg, IntraVENous, Q6H PRN, Mitra A Lump, APRN - CNP    polyethylene glycol (GLYCOLAX) packet 17 g, 17 g, Oral, Daily PRN, Mitra A Lump, APRN - CNP    acetaminophen (TYLENOL) tablet 650 mg, 650 mg, Oral, Q6H PRN, 650 mg at 02/06/23 1222 **OR** acetaminophen (TYLENOL) suppository 650 mg, 650 mg, Rectal, Q6H PRN, Mitra A Lump, APRN - CNP    furosemide (LASIX) injection 40 mg, 40 mg, IntraVENous, Daily, Mitra A Lump, APRN - CNP, 40 mg at 02/06/23 0752      I/O (24Hr):     Intake/Output Summary (Last 24 hours) at 2/6/2023 1509  Last data filed at 2/6/2023 1228  Gross per 24 hour   Intake 1530 ml   Output 2250 ml   Net -720 ml       Data:           Labs:    Hematology:  Recent Labs     02/04/23 1851 02/05/23 0559 02/06/23 0315   WBC 7.5 6.0 5.3   RBC 4.56 4.20 4.07   HGB 12.5 11.5* 11.1*   HCT 39.4 36.9 35.8*   MCV 86.4 87.9 88.0   MCH 27.4 27.4 27.3   MCHC 31.7 31.2 31.0   RDW 14.5* 14.4 14.5*   * 133* 133*   MPV 10.9 10.4 10.6   INR  --  1.7  --      Chemistry:  Recent Labs     02/04/23 1851 02/04/23 2033 02/05/23 0559 02/06/23 0315   *  --  136 134*   K 3.6*  --  3.5* 3.4*   CL 100  --  102 101   CO2 16*  --  26 24   GLUCOSE 274*  --  140* 112*   BUN 26*  --  26* 23   CREATININE 1.01*  --  1.01* 0.92*   MG  --   --  1.6 1.6   ANIONGAP 14  --  8* 9   LABGLOM >60  --  >60 >60   CALCIUM 9.0  --  8.7 8.4*   PROBNP 2,364*  --   --   --    TROPHS 33* 49*  --   --    MYOGLOBIN 72* 81*  --   --      Recent Labs     02/04/23  1851 02/04/23  2228 02/05/23  1206 02/05/23  1645 02/05/23  1945 02/05/23  2319 02/06/23  0803 02/06/23  1204   TSH 4.96  --   --   --   --   --   --   --    POCGLU  --    < > 165* 132* 177* 101 132* 179*    < > = values in this interval not displayed. Lab Results   Component Value Date/Time    SPECIAL NOT REPORTED 08/29/2012 06:42 PM     Lab Results   Component Value Date/Time    CULTURE (A) 08/29/2012 06:41 PM     NO SALMONELLA, SHIGELLA, YERSINIA OR CAMPYLOBACTER ISOLATED    CULTURE NO ESCHERICHIA COLI O157:H7 ISOLATED 08/29/2012 06:41 PM    CULTURE NEGATIVE FOR SHIGATOXIN (A) 08/29/2012 06:41 PM    CULTURE  08/29/2012 06:41 PM     Performed at [address] [place] [address] 3 [phone]       No results found for: POCPH, PHART, PH, POCPCO2, LYB0BKR, PCO2, POCPO2, PO2ART, PO2, POCHCO3, EAM5HAA, HCO3, NBEA, PBEA, BEART, BE, THGBART, THB, DPW5OAW, NNLF1OOQ, J7QPBDMR, O2SAT, FIO2    Radiology:    XR RADIUS ULNA LEFT (2 VIEWS)    Result Date: 2/2/2023  No acute osseous abnormality. XR CHEST PORTABLE    Result Date: 2/4/2023  Findings favoring moderate CHF pattern pulmonary vascular congestion. Difficult to exclude superimposed infection. Attention on follow-up. XR CHEST PORTABLE    Result Date: 2/2/2023  No acute process.          All radiological studies reviewed  Code Status:  Full Code        Electronically signed by Dar Lozada MD on 2/6/2023 at 3:09 PM    This note was created with the assistance of a speech-recognition program.  Although the intention is to generate a document that actually reflects the content of the visit, no guarantees can be provided that every mistake has been identified and corrected by editing. Note was updated later by me after  physical examination and  completion of the assessment.

## 2023-02-06 NOTE — PROGRESS NOTES
Physical Therapy  DATE: 2023    NAME: Estefania Khan  MRN: [id number]   : 1960    Patient not seen this date for Physical Therapy due to:      [] Cancel by RN or physician due to:    [] Hemodialysis    [] Critical Lab Value Level     [] Blood transfusion in progress    [] Acute or unstable cardiovascular status   _MAP < 55 or more than >115  _HR < 40 or > 130    [] Acute or unstable pulmonary status   -FiO2 > 60%   _RR < 5 or >40    _O2 sats < 85%    [] Strict Bedrest    [] Off Unit for surgery or procedure    [] Off Unit for testing       [] Pending imaging to R/O fracture    [x] Refusal by Patient (Patient refused any mobility or exercise reporting \"stomach ache\" and \"gas\" many times and reports she will try tomorrow.)     [] Other      [] PT being discontinued at this time. Patient independent. No further needs. [] PT being discontinued at this time as the patient has been transferred to hospice care. No further needs.       Keysha Stone, PTA

## 2023-02-06 NOTE — PLAN OF CARE
Problem: Chronic Conditions and Co-morbidities  Goal: Patient's chronic conditions and co-morbidity symptoms are monitored and maintained or improved  2/6/2023 1129 by Chris Ruelas RN  Outcome: Progressing  Flowsheets (Taken 2/5/2023 1547)  Care Plan - Patient's Chronic Conditions and Co-Morbidity Symptoms are Monitored and Maintained or Improved:   Collaborate with multidisciplinary team to address chronic and comorbid conditions and prevent exacerbation or deterioration   Monitor and assess patient's chronic conditions and comorbid symptoms for stability, deterioration, or improvement  2/6/2023 0113 by Otilia Garcia RN  Outcome: Progressing     Problem: Discharge Planning  Goal: Discharge to home or other facility with appropriate resources  2/6/2023 1129 by Chris Ruelas RN  Outcome: Progressing  Flowsheets (Taken 2/5/2023 1547)  Discharge to home or other facility with appropriate resources:   Identify discharge learning needs (meds, wound care, etc)   Identify barriers to discharge with patient and caregiver  Note: Planning to dc home with sister , 24 hours aid care   2/6/2023 0113 by Otilia Garcia RN  Outcome: Progressing     Problem: ABCDS Injury Assessment  Goal: Absence of physical injury  2/6/2023 0113 by Otilia Garcia RN  Outcome: Progressing  Flowsheets (Taken 2/6/2023 0100)  Absence of Physical Injury: Implement safety measures based on patient assessment     Problem: Skin/Tissue Integrity  Goal: Absence of new skin breakdown  Description: 1. Monitor for areas of redness and/or skin breakdown  2. Assess vascular access sites hourly  3. Every 4-6 hours minimum:  Change oxygen saturation probe site  4. Every 4-6 hours:  If on nasal continuous positive airway pressure, respiratory therapy assess nares and determine need for appliance change or resting period.   2/6/2023 1129 by Chris Ruelas RN  Outcome: Progressing  Note: Interdry sheets placed into pannus /groin folds Problem: Safety - Adult  Goal: Free from fall injury  2/6/2023 1129 by Jaida Bagley RN  Outcome: Progressing  Flowsheets (Taken 2/6/2023 1129)  Free From Fall Injury: Instruct family/caregiver on patient safety  2/6/2023 0113 by Shonna Earl RN  Outcome: Progressing

## 2023-02-06 NOTE — PROGRESS NOTES
Occupational [address]  Occupational Therapy Not Seen Note    Patient not available for Occupational Therapy due to:    [] Testing:    [] Hemodialysis    [] Cancelled by RN:    [x]Refusal by Patient: 2/6: (CX) Pt adamantly refusing OT eval at this time. Pt reporting having a stomach ache and would like to rest. Attempted to educated pt on purpose of OT eval. Pt continued to decline eval. Will continue to follow.      [] Surgery:     [] Intubation:     [] Pain Medication:    [] Sedation:     [] Spine Precautions :    [] Medical Instability:    [] Other:      Beth Chen, OT

## 2023-02-06 NOTE — PLAN OF CARE
Problem: Chronic Conditions and Co-morbidities  Goal: Patient's chronic conditions and co-morbidity symptoms are monitored and maintained or improved  2/6/2023 0113 by Nicolsa Haynes RN  Outcome: Progressing  2/5/2023 1547 by Mendoza Zarco RN  Outcome: Progressing  Flowsheets (Taken 2/5/2023 1547)  Care Plan - Patient's Chronic Conditions and Co-Morbidity Symptoms are Monitored and Maintained or Improved:   Collaborate with multidisciplinary team to address chronic and comorbid conditions and prevent exacerbation or deterioration   Monitor and assess patient's chronic conditions and comorbid symptoms for stability, deterioration, or improvement     Problem: Discharge Planning  Goal: Discharge to home or other facility with appropriate resources  2/6/2023 0113 by Nicolas Haynes RN  Outcome: Progressing  2/5/2023 1547 by Mendoza Zarco RN  Outcome: Progressing  Flowsheets (Taken 2/5/2023 1547)  Discharge to home or other facility with appropriate resources:   Identify discharge learning needs (meds, wound care, etc)   Identify barriers to discharge with patient and caregiver     Problem: ABCDS Injury Assessment  Goal: Absence of physical injury  2/6/2023 0113 by Nicolas Haynes RN  Outcome: Progressing  Flowsheets (Taken 2/6/2023 0100)  Absence of Physical Injury: Implement safety measures based on patient assessment  2/5/2023 1547 by Mendoza Zarco RN  Outcome: Progressing  Flowsheets (Taken 2/5/2023 1547)  Absence of Physical Injury: Implement safety measures based on patient assessment     Problem: Skin/Tissue Integrity  Goal: Absence of new skin breakdown  Description: 1. Monitor for areas of redness and/or skin breakdown  2. Assess vascular access sites hourly  3. Every 4-6 hours minimum:  Change oxygen saturation probe site  4. Every 4-6 hours:  If on nasal continuous positive airway pressure, respiratory therapy assess nares and determine need for appliance change or resting period.   Outcome: Progressing     Problem: Safety - Adult  Goal: Free from fall injury  Outcome: Progressing

## 2023-02-06 NOTE — PROGRESS NOTES
[address], [facility]  [phone]          Progress Note    Patient Name:  Lenard Curry    :  1960 2:22 PM      SUBJECTIVE       Ms. Alex chest pain or shortness of breath. She states that she is having \"a tummy ache\"      OBJECTIVE     Vital signs:    /68   Pulse 84   Temp 97.7 °F (36.5 °C) (Temporal)   Resp 21   Ht 5' 11\" (1.803 m)   Wt 261 lb (118.4 kg)   SpO2 98%   BMI 36.40 kg/m²  2 L/min  .tro    Admit Weight:  260 lb 6.4 oz (118.1 kg)    Last 3 weights: Wt Readings from Last 3 Encounters:   23 261 lb (118.4 kg)   23 200 lb (90.7 kg)       BMI: Body mass index is 36.4 kg/m². Input/Output:       Intake/Output Summary (Last 24 hours) at 2023 1422  Last data filed at 2023 1228  Gross per 24 hour   Intake 1530 ml   Output 2250 ml   Net -720 ml       Date 23 0000 - 23 2359   Shift 3400-3899 4464-6574 0674-6565 24 Hour Total   INTAKE   P.O.(mL/kg/hr)  590  590   Shift Total(mL/kg)  590(5)  590(5)   OUTPUT   Urine(mL/kg/hr) 250(0.3) 1150  1400   Shift Total(mL/kg) 250(2.1) 1150(9.7)  1400(11.8)   Weight (kg) 118.4 118.4 118.4 118.4     Exam:     General appearance: Acute distress   Lungs: Fairly clear  Heart: S1-S2 no murmur  Abdomen: Soft  Extremities: Significant edema        Laboratory Studies:     CBC:   Recent Labs     23  0559 23  0315   WBC 7.5 6.0 5.3   HGB 12.5 11.5* 11.1*   HCT 39.4 36.9 35.8*   MCV 86.4 87.9 88.0   * 133* 133*     BMP:   Recent Labs     23  1851 23  0559 23  0315   * 136 134*   K 3.6* 3.5* 3.4*    102 101   CO2 16* 26 24   BUN 26* 26* 23   CREATININE 1.01* 1.01* 0.92*     PT/INR:   Recent Labs     23  0559   PROTIME 19.9*   INR 1.7     APTT: No results for input(s): APTT in the last 72 hours.   MAG:   Recent Labs     23  0559 23  0315   MG 1.6 1.6     D Dimer: No results for input(s): DDIMER in the last 72 hours. Troponin    Recent Labs     23   TROPHS 33* 49*                BNP No results for input(s): BNP in the last 72 hours. Recent Labs     23   PROBNP 2,364*         Pulse Ox: SpO2  Av %  Min: 95 %  Max: 100 %  Supplemental O2: O2 Flow Rate (L/min): 2 L/min     Current Meds:    apixaban  5 mg Oral BID    atorvastatin  20 mg Oral Nightly    metoprolol succinate  50 mg Oral Daily    QUEtiapine  25 mg Oral Nightly    sertraline  25 mg Oral Daily    spironolactone  25 mg Oral Daily    fenofibrate  54 mg Oral Daily    glipiZIDE  2.5 mg Oral QAM AC    omega-3 acid ethyl esters  1 g Oral BID    insulin lispro  0-8 Units SubCUTAneous TID WC    insulin lispro  0-4 Units SubCUTAneous Nightly    adenosine  6 mg IntraVENous Once    sodium chloride flush  5-40 mL IntraVENous 2 times per day    furosemide  40 mg IntraVENous Daily     Continuous Infusions:    dextrose      sodium chloride           XR RADIUS ULNA LEFT (2 VIEWS)    Result Date: 2023  No acute osseous abnormality. XR CHEST PORTABLE    Result Date: 2023  Findings favoring moderate CHF pattern pulmonary vascular congestion. Difficult to exclude superimposed infection. Attention on follow-up. XR CHEST PORTABLE    Result Date: 2023  No acute process. Echo:      ASSESSMENT     Principal Problem:    New onset of congestive heart failure ([address]  Resolved Problems:    * No resolved hospital problems. *      #1 acute heart failure with preserved ejection fraction patient has diuresed 0.7 L in the last 24 hours. Reviewed echocardiogram results. 2 primary hypertension. Blood pressure is reasonably controlled. Your other diagnoses    PLAN       #1 continue diuresis.   2.  Await echocardiogram.    Electronically signed by Veto Talavera MD on 2023 at 2:22 PM

## 2023-02-07 LAB
ABSOLUTE EOS #: 0.06 K/UL (ref 0–0.44)
ABSOLUTE IMMATURE GRANULOCYTE: 0 K/UL (ref 0–0.3)
ABSOLUTE LYMPH #: 1.56 K/UL (ref 1.1–3.7)
ABSOLUTE MONO #: 0.66 K/UL (ref 0.1–1.2)
ANION GAP SERPL CALCULATED.3IONS-SCNC: 10 MMOL/L (ref 9–17)
BACTERIA: ABNORMAL
BASOPHILS # BLD: 1 % (ref 0–2)
BASOPHILS ABSOLUTE: 0.06 K/UL (ref 0–0.2)
BILIRUBIN URINE: NEGATIVE
BUN SERPL-MCNC: 20 MG/DL (ref 8–23)
BUN/CREAT BLD: 21 (ref 9–20)
CALCIUM SERPL-MCNC: 8.8 MG/DL (ref 8.6–10.4)
CHLORIDE SERPL-SCNC: 102 MMOL/L (ref 98–107)
CO2 SERPL-SCNC: 22 MMOL/L (ref 20–31)
COLOR: YELLOW
CREAT SERPL-MCNC: 0.94 MG/DL (ref 0.5–0.9)
EKG ATRIAL RATE: 123 BPM
EKG ATRIAL RATE: 174 BPM
EKG P AXIS: 74 DEGREES
EKG P-R INTERVAL: 144 MS
EKG Q-T INTERVAL: 300 MS
EKG Q-T INTERVAL: 304 MS
EKG QRS DURATION: 74 MS
EKG QRS DURATION: 88 MS
EKG QTC CALCULATION (BAZETT): 435 MS
EKG QTC CALCULATION (BAZETT): 488 MS
EKG R AXIS: -62 DEGREES
EKG R AXIS: -70 DEGREES
EKG T AXIS: 76 DEGREES
EKG T AXIS: 80 DEGREES
EKG VENTRICULAR RATE: 123 BPM
EKG VENTRICULAR RATE: 159 BPM
EOSINOPHILS RELATIVE PERCENT: 1 % (ref 1–4)
EPITHELIAL CELLS UA: ABNORMAL /HPF (ref 0–5)
GFR SERPL CREATININE-BSD FRML MDRD: >60 ML/MIN/1.73M2
GLUCOSE BLD-MCNC: 142 MG/DL (ref 65–105)
GLUCOSE BLD-MCNC: 148 MG/DL (ref 65–105)
GLUCOSE BLD-MCNC: 259 MG/DL (ref 65–105)
GLUCOSE BLD-MCNC: 269 MG/DL (ref 65–105)
GLUCOSE SERPL-MCNC: 149 MG/DL (ref 70–99)
GLUCOSE UR STRIP.AUTO-MCNC: NEGATIVE MG/DL
HCT VFR BLD AUTO: 37.1 % (ref 36.3–47.1)
HGB BLD-MCNC: 11.7 G/DL (ref 11.9–15.1)
IMMATURE GRANULOCYTES: 0 %
KETONES UR STRIP.AUTO-MCNC: NEGATIVE MG/DL
LEUKOCYTE ESTERASE UR QL STRIP.AUTO: ABNORMAL
LYMPHOCYTES # BLD: 26 % (ref 24–43)
MCH RBC QN AUTO: 27.3 PG (ref 25.2–33.5)
MCHC RBC AUTO-ENTMCNC: 31.5 G/DL (ref 28.4–34.8)
MCV RBC AUTO: 86.5 FL (ref 82.6–102.9)
MONOCYTES # BLD: 11 % (ref 3–12)
NITRITE UR QL STRIP.AUTO: POSITIVE
NRBC AUTOMATED: 0 PER 100 WBC
PDW BLD-RTO: 14.5 % (ref 11.8–14.4)
PLATELET # BLD AUTO: 132 K/UL (ref 138–453)
PMV BLD AUTO: 10.6 FL (ref 8.1–13.5)
POTASSIUM SERPL-SCNC: 3.9 MMOL/L (ref 3.7–5.3)
PROT UR STRIP.AUTO-MCNC: 6 MG/DL (ref 5–8)
PROT UR STRIP.AUTO-MCNC: ABNORMAL MG/DL
RBC # BLD: 4.29 M/UL (ref 3.95–5.11)
RBC CLUMPS #/AREA URNS AUTO: ABNORMAL /HPF (ref 0–2)
SEG NEUTROPHILS: 61 % (ref 36–65)
SEGMENTED NEUTROPHILS ABSOLUTE COUNT: 3.66 K/UL (ref 1.5–8.1)
SODIUM SERPL-SCNC: 134 MMOL/L (ref 135–144)
SPECIFIC GRAVITY UA: 1.02 (ref 1–1.03)
TURBIDITY: ABNORMAL
URINE HGB: ABNORMAL
UROBILINOGEN, URINE: NORMAL
WBC # BLD AUTO: 6 K/UL (ref 3.5–11.3)
WBC UA: ABNORMAL /HPF (ref 0–5)

## 2023-02-07 PROCEDURE — 6370000000 HC RX 637 (ALT 250 FOR IP): Performed by: NURSE PRACTITIONER

## 2023-02-07 PROCEDURE — 6370000000 HC RX 637 (ALT 250 FOR IP): Performed by: HOSPITALIST

## 2023-02-07 PROCEDURE — 97535 SELF CARE MNGMENT TRAINING: CPT

## 2023-02-07 PROCEDURE — 87088 URINE BACTERIA CULTURE: CPT

## 2023-02-07 PROCEDURE — 2060000000 HC ICU INTERMEDIATE R&B

## 2023-02-07 PROCEDURE — 85025 COMPLETE CBC W/AUTO DIFF WBC: CPT

## 2023-02-07 PROCEDURE — 82947 ASSAY GLUCOSE BLOOD QUANT: CPT

## 2023-02-07 PROCEDURE — 97116 GAIT TRAINING THERAPY: CPT

## 2023-02-07 PROCEDURE — 6370000000 HC RX 637 (ALT 250 FOR IP): Performed by: FAMILY MEDICINE

## 2023-02-07 PROCEDURE — 2580000003 HC RX 258: Performed by: NURSE PRACTITIONER

## 2023-02-07 PROCEDURE — 80048 BASIC METABOLIC PNL TOTAL CA: CPT

## 2023-02-07 PROCEDURE — 81001 URINALYSIS AUTO W/SCOPE: CPT

## 2023-02-07 PROCEDURE — 6360000002 HC RX W HCPCS: Performed by: NURSE PRACTITIONER

## 2023-02-07 PROCEDURE — 87086 URINE CULTURE/COLONY COUNT: CPT

## 2023-02-07 PROCEDURE — 97166 OT EVAL MOD COMPLEX 45 MIN: CPT

## 2023-02-07 PROCEDURE — 36415 COLL VENOUS BLD VENIPUNCTURE: CPT

## 2023-02-07 PROCEDURE — 97530 THERAPEUTIC ACTIVITIES: CPT

## 2023-02-07 PROCEDURE — 87186 SC STD MICRODIL/AGAR DIL: CPT

## 2023-02-07 RX ORDER — TRAMADOL HYDROCHLORIDE 50 MG/1
50 TABLET ORAL EVERY 8 HOURS SCHEDULED
Status: DISCONTINUED | OUTPATIENT
Start: 2023-02-07 | End: 2023-02-08

## 2023-02-07 RX ADMIN — SODIUM CHLORIDE, PRESERVATIVE FREE 10 ML: 5 INJECTION INTRAVENOUS at 08:30

## 2023-02-07 RX ADMIN — ATORVASTATIN CALCIUM 20 MG: 20 TABLET, FILM COATED ORAL at 21:00

## 2023-02-07 RX ADMIN — ACETAMINOPHEN 650 MG: 325 TABLET ORAL at 17:32

## 2023-02-07 RX ADMIN — SERTRALINE 25 MG: 50 TABLET, FILM COATED ORAL at 08:29

## 2023-02-07 RX ADMIN — APIXABAN 5 MG: 5 TABLET, FILM COATED ORAL at 08:29

## 2023-02-07 RX ADMIN — QUETIAPINE FUMARATE 25 MG: 25 TABLET ORAL at 21:00

## 2023-02-07 RX ADMIN — METOPROLOL SUCCINATE 50 MG: 50 TABLET, EXTENDED RELEASE ORAL at 08:30

## 2023-02-07 RX ADMIN — OMEGA-3-ACID ETHYL ESTERS 1 G: 1 CAPSULE, LIQUID FILLED ORAL at 08:30

## 2023-02-07 RX ADMIN — CEFEPIME 2000 MG: 2 INJECTION, POWDER, FOR SOLUTION INTRAVENOUS at 21:26

## 2023-02-07 RX ADMIN — INSULIN LISPRO 4 UNITS: 100 INJECTION, SOLUTION INTRAVENOUS; SUBCUTANEOUS at 11:59

## 2023-02-07 RX ADMIN — SODIUM CHLORIDE, PRESERVATIVE FREE 10 ML: 5 INJECTION INTRAVENOUS at 21:20

## 2023-02-07 RX ADMIN — TRAMADOL HYDROCHLORIDE 50 MG: 50 TABLET, COATED ORAL at 11:59

## 2023-02-07 RX ADMIN — INSULIN LISPRO 4 UNITS: 100 INJECTION, SOLUTION INTRAVENOUS; SUBCUTANEOUS at 17:19

## 2023-02-07 RX ADMIN — FUROSEMIDE 40 MG: 10 INJECTION, SOLUTION INTRAMUSCULAR; INTRAVENOUS at 08:29

## 2023-02-07 RX ADMIN — TRAMADOL HYDROCHLORIDE 50 MG: 50 TABLET, COATED ORAL at 21:00

## 2023-02-07 RX ADMIN — GLIPIZIDE 2.5 MG: 5 TABLET ORAL at 06:30

## 2023-02-07 RX ADMIN — FENOFIBRATE 54 MG: 54 TABLET ORAL at 12:00

## 2023-02-07 RX ADMIN — ACETAMINOPHEN 650 MG: 325 TABLET ORAL at 06:31

## 2023-02-07 RX ADMIN — SPIRONOLACTONE 25 MG: 25 TABLET ORAL at 08:28

## 2023-02-07 RX ADMIN — APIXABAN 5 MG: 5 TABLET, FILM COATED ORAL at 21:00

## 2023-02-07 RX ADMIN — OMEGA-3-ACID ETHYL ESTERS 1 G: 1 CAPSULE, LIQUID FILLED ORAL at 21:00

## 2023-02-07 ASSESSMENT — PAIN DESCRIPTION - LOCATION
LOCATION: HEAD

## 2023-02-07 ASSESSMENT — PAIN - FUNCTIONAL ASSESSMENT
PAIN_FUNCTIONAL_ASSESSMENT: ACTIVITIES ARE NOT PREVENTED

## 2023-02-07 ASSESSMENT — PAIN SCALES - GENERAL
PAINLEVEL_OUTOF10: 6
PAINLEVEL_OUTOF10: 10
PAINLEVEL_OUTOF10: 8
PAINLEVEL_OUTOF10: 1
PAINLEVEL_OUTOF10: 2
PAINLEVEL_OUTOF10: 6
PAINLEVEL_OUTOF10: 4
PAINLEVEL_OUTOF10: 6

## 2023-02-07 ASSESSMENT — PAIN DESCRIPTION - DESCRIPTORS
DESCRIPTORS: ACHING

## 2023-02-07 ASSESSMENT — PAIN DESCRIPTION - ORIENTATION: ORIENTATION: ANTERIOR

## 2023-02-07 NOTE — PROGRESS NOTES
Physical Therapy  DATE: 2023    NAME: Sasha Colbert  MRN: [id number]   : 1960    Patient not seen this date for Physical Therapy due to:      [] Cancel by RN or physician due to:    [] Hemodialysis    [] Critical Lab Value Level     [] Blood transfusion in progress    [] Acute or unstable cardiovascular status   _MAP < 55 or more than >115  _HR < 40 or > 130    [] Acute or unstable pulmonary status   -FiO2 > 60%   _RR < 5 or >40    _O2 sats < 85%    [] Strict Bedrest    [] Off Unit for surgery or procedure    [] Off Unit for testing       [] Pending imaging to R/O fracture    [x] Refusal by Patient (Patient reporting she is too tired and needs a nap before working with PT treatment. Asked to be checked on her later today)     [] Other      [] PT being discontinued at this time. Patient independent. No further needs. [] PT being discontinued at this time as the patient has been transferred to hospice care. No further needs.       Jose F Parker, PTA

## 2023-02-07 NOTE — PROGRESS NOTES
[address] Hospitalist        2/7/2023   3:56 PM    Name:  Estefania Khan  MRN:    [id number]     Acct:     [de-identified]   Room:  2035/2035-01  IP Day: 3     Admit Date: 2/4/2023  6:38 PM  PCP: Merry Hernandez MD    C/C:   Chief Complaint   Patient presents with    Fall     Two falls at group home today    Extremity Weakness     Ambulates with walker but group home concerned pt is unable to get around anymore        Assessment:        Acute diastolic congestive heart failure  Elevated troponin  Paroxysmal supraventricular tachycardia  Hypertension  Hyperlipidemia  Diabetes type 2  History of CVA      Plan:        Patient is currently in CDU  O2 maintain oxygen saturation greater than 92%    Monitor respiratory status  Lasix 40 mg IV daily  Echocardiogram  Serial troponin  IV Lopressor as needed  Cardiology consult      DVT and GI prophylaxis  Continue to monitor/telemetry/CBC with differential daily/BMP daily  Continue medications as below    Discharge planning as per recommendation of cardiology    Scheduled Meds:   traMADol  50 mg Oral 3 times per day    apixaban  5 mg Oral BID    atorvastatin  20 mg Oral Nightly    metoprolol succinate  50 mg Oral Daily    QUEtiapine  25 mg Oral Nightly    sertraline  25 mg Oral Daily    spironolactone  25 mg Oral Daily    fenofibrate  54 mg Oral Daily    glipiZIDE  2.5 mg Oral QAM AC    omega-3 acid ethyl esters  1 g Oral BID    insulin lispro  0-8 Units SubCUTAneous TID WC    insulin lispro  0-4 Units SubCUTAneous Nightly    adenosine  6 mg IntraVENous Once    sodium chloride flush  5-40 mL IntraVENous 2 times per day    furosemide  40 mg IntraVENous Daily     Continuous Infusions:   dextrose      sodium chloride       PRN Meds:  hydrOXYzine HCl, 25 mg, Nightly PRN  glucose, 4 tablet, PRN  dextrose bolus, 125 mL, PRN   Or  dextrose bolus, 250 mL, PRN  glucagon (rDNA), 1 mg, PRN  dextrose, , Continuous PRN  sodium chloride flush, 10 mL, PRN  sodium chloride, , PRN  potassium chloride, 40 mEq, PRN   Or  potassium alternative oral replacement, 40 mEq, PRN   Or  potassium chloride, 10 mEq, PRN  magnesium sulfate, 1,000 mg, PRN  ondansetron, 4 mg, Q8H PRN   Or  ondansetron, 4 mg, Q6H PRN  polyethylene glycol, 17 g, Daily PRN  acetaminophen, 650 mg, Q6H PRN   Or  acetaminophen, 650 mg, Q6H PRN          Subjective:     Patient seen and examined at bedside. No overnight events. No acute complaints today. Afebrile  Pt. Denies any CP, SOB, palpitation, HA, dizziness, chills, cough, cold, changes in urination, BM or skin changes or any pain. ROS:  A 10 point system reviewed and negative otherwise mentioned above. Physical Examination:      Vitals:  /66   Pulse 83   Temp 97.8 °F (36.6 °C) (Temporal)   Resp 20   Ht 5' 11\" (1.803 m)   Wt 261 lb 7 oz (118.6 kg)   SpO2 100%   BMI 36.46 kg/m²   Temp (24hrs), Av.2 °F (36.2 °C), Min:97 °F (36.1 °C), Max:97.8 °F (36.6 °C)    Weight:   Wt Readings from Last 3 Encounters:   23 261 lb 7 oz (118.6 kg)   23 200 lb (90.7 kg)     I/O last 3 completed shifts:  I/O last 3 completed shifts:   In: 4075 [P.O.:1190]  Out: 2150 [Urine:2150]     Recent Labs     23  1600 23  1958 23  0736 23  1132   POCGLU 187* 189* 148* 269*           General appearance - alert, well appearing, and in no acute distress  Mental status - oriented to person, place, and time with normal affect  Head - normocephalic and atraumatic  Eyes - pupils equal and reactive, extraocular eye movements intact, conjunctiva clear  Ears - hearing appears to be intact  Nose - no drainage noted  Mouth - mucous membranes moist  Neck - supple, no carotid bruits, thyroid not palpable  Chest - clear to auscultation, normal effort  Heart - normal rate, regular rhythm, no murmur  Abdomen - soft, nontender, nondistended, bowel sounds present all four quadrants, no masses, hepatomegaly or splenomegaly  Neurological - normal speech, no focal findings or movement disorder noted, cranial nerves II through XII grossly intact  Extremities - peripheral pulses palpable, no pedal edema or calf pain with palpation  Skin - no gross lesions, rashes, or induration noted        Medications:      Allergies: No Known Allergies    Current Meds:   Current Facility-Administered Medications:     traMADol (ULTRAM) tablet 50 mg, 50 mg, Oral, 3 times per day, Jesusita Yancey MD, 50 mg at 02/07/23 1159    apixaban (ELIQUIS) tablet 5 mg, 5 mg, Oral, BID, Quentin Bustos MD, 5 mg at 02/07/23 0829    atorvastatin (LIPITOR) tablet 20 mg, 20 mg, Oral, Nightly, Quentin Bustos MD, 20 mg at 02/06/23 2210    hydrOXYzine HCl (ATARAX) tablet 25 mg, 25 mg, Oral, Nightly PRN, Quentin Bustos MD    metoprolol succinate (TOPROL XL) extended release tablet 50 mg, 50 mg, Oral, Daily, Quentin Bustos MD, 50 mg at 02/07/23 0830    QUEtiapine (SEROQUEL) tablet 25 mg, 25 mg, Oral, Nightly, Quentin Bustos MD, 25 mg at 02/06/23 2210    sertraline (ZOLOFT) tablet 25 mg, 25 mg, Oral, Daily, Quentin Bustos MD, 25 mg at 02/07/23 8627    spironolactone (ALDACTONE) tablet 25 mg, 25 mg, Oral, Daily, Quentin Bustos MD, 25 mg at 02/07/23 0828    fenofibrate (TRICOR) tablet 54 mg, 54 mg, Oral, Daily, Quentin Bustos MD, 54 mg at 02/07/23 1200    glipiZIDE (GLUCOTROL) tablet 2.5 mg, 2.5 mg, Oral, QAM AC, Quentin Bustos MD, 2.5 mg at 02/07/23 0630    omega-3 acid ethyl esters (LOVAZA) capsule 1 g, 1 g, Oral, BID, Quentin Bustos MD, 1 g at 02/07/23 0830    insulin lispro (HUMALOG) injection vial 0-8 Units, 0-8 Units, SubCUTAneous, TID WC, Bibi Li MD, 4 Units at 02/07/23 1159    insulin lispro (HUMALOG) injection vial 0-4 Units, 0-4 Units, SubCUTAneous, Nightly, Quentin Bustos MD    glucose chewable tablet 16 g, 4 tablet, Oral, PRN, Quentin Bustos MD    dextrose bolus 10% 125 mL, 125 mL, IntraVENous, PRN **OR** dextrose bolus 10% 250 mL, 250 mL, IntraVENous, PRN, Bibi Li MD glucagon (rDNA) injection 1 mg, 1 mg, SubCUTAneous, PRN, Quentin Bustos MD    dextrose 10 % infusion, , IntraVENous, Continuous PRN, Quentin Bustos MD    adenosine (ADENOCARD) injection 6 mg, 6 mg, IntraVENous, Once, Kedar Morillo MD    sodium chloride flush 0.9 % injection 5-40 mL, 5-40 mL, IntraVENous, 2 times per day, Mitra A Lump, APRN - CNP, 10 mL at 02/07/23 0830    sodium chloride flush 0.9 % injection 10 mL, 10 mL, IntraVENous, PRN, Mitra A Lump, APRN - CNP, 10 mL at 02/06/23 2212    0.9 % sodium chloride infusion, , IntraVENous, PRN, [facility] Lump, APRN - CNP    potassium chloride (KLOR-CON M) extended release tablet 40 mEq, 40 mEq, Oral, PRN, 40 mEq at 02/06/23 0749 **OR** potassium bicarb-citric acid (EFFER-K) effervescent tablet 40 mEq, 40 mEq, Oral, PRN **OR** potassium chloride 10 mEq/100 mL IVPB (Peripheral Line), 10 mEq, IntraVENous, PRN, Mitra A Lump, APRN - CNP    magnesium sulfate 1000 mg in dextrose 5% 100 mL IVPB, 1,000 mg, IntraVENous, PRN, Mitra A Lump, APRN - CNP    ondansetron (ZOFRAN-ODT) disintegrating tablet 4 mg, 4 mg, Oral, Q8H PRN, 4 mg at 02/06/23 1614 **OR** ondansetron (ZOFRAN) injection 4 mg, 4 mg, IntraVENous, Q6H PRN, Mitra A Lump, APRN - CNP    polyethylene glycol (GLYCOLAX) packet 17 g, 17 g, Oral, Daily PRN, Mitra A Lump, APRN - CNP    acetaminophen (TYLENOL) tablet 650 mg, 650 mg, Oral, Q6H PRN, 650 mg at 02/07/23 0631 **OR** acetaminophen (TYLENOL) suppository 650 mg, 650 mg, Rectal, Q6H PRN, Mitra A Lump, APRN - CNP    furosemide (LASIX) injection 40 mg, 40 mg, IntraVENous, Daily, Mitra A Lump, APRN - CNP, 40 mg at 02/07/23 0829      I/O (24Hr):     Intake/Output Summary (Last 24 hours) at 2/7/2023 1556  Last data filed at 2/7/2023 1352  Gross per 24 hour   Intake 840 ml   Output 450 ml   Net 390 ml         Data:           Labs:    Hematology:  Recent Labs     02/05/23  0559 02/06/23  0315 02/07/23  0523   WBC 6.0 5.3 6.0   RBC 4.20 4.07 4.29   HGB 11.5* 11.1* 11.7*   HCT 36.9 35.8* 37.1   MCV 87.9 88.0 86.5   MCH 27.4 27.3 27.3   MCHC 31.2 31.0 31.5   RDW 14.4 14.5* 14.5*   * 133* 132*   MPV 10.4 10.6 10.6   INR 1.7  --   --        Chemistry:  Recent Labs     02/04/23  1851 02/04/23 2033 02/05/23  0559 02/06/23  0315 02/06/23 2142 02/07/23  0523   *  --  136 134*  --  134*   K 3.6*  --  3.5* 3.4*  --  3.9     --  102 101  --  102   CO2 16*  --  26 24  --  22   GLUCOSE 274*  --  140* 112*  --  149*   BUN 26*  --  26* 23  --  20   CREATININE 1.01*  --  1.01* 0.92*  --  0.94*   MG  --   --  1.6 1.6  --   --    ANIONGAP 14  --  8* 9  --  10   LABGLOM >60  --  >60 >60  --  >60   CALCIUM 9.0  --  8.7 8.4*  --  8.8   PROBNP 2,364*  --   --   --  1,357*  --    TROPHS 33* 49*  --   --   --   --    MYOGLOBIN 72* 81*  --   --   --   --        Recent Labs     02/04/23  1851 02/04/23 2228 02/06/23  0803 02/06/23  1204 02/06/23  1600 02/06/23  1958 02/07/23  0736 02/07/23  1132   TSH 4.96  --   --   --   --   --   --   --    POCGLU  --    < > 132* 179* 187* 189* 148* 269*    < > = values in this interval not displayed. Lab Results   Component Value Date/Time    SPECIAL NOT REPORTED 08/29/2012 06:42 PM     Lab Results   Component Value Date/Time    CULTURE (A) 08/29/2012 06:41 PM     NO SALMONELLA, SHIGELLA, YERSINIA OR CAMPYLOBACTER ISOLATED    CULTURE NO ESCHERICHIA COLI O157:H7 ISOLATED 08/29/2012 06:41 PM    CULTURE NEGATIVE FOR SHIGATOXIN (A) 08/29/2012 06:41 PM    CULTURE  08/29/2012 06:41 PM     Performed at [address] [place] [address] 3 [phone]       No results found for: POCPH, PHART, PH, POCPCO2, VQD8IAS, PCO2, POCPO2, PO2ART, PO2, POCHCO3, FMF2KJA, HCO3, NBEA, PBEA, BEART, BE, THGBART, THB, IRI7ZWE, YYKM5ERS, W9RHRTIV, O2SAT, FIO2    Radiology:    XR RADIUS ULNA LEFT (2 VIEWS)    Result Date: 2/2/2023  No acute osseous abnormality.      XR CHEST PORTABLE    Result Date: 2/4/2023  Findings favoring moderate CHF pattern pulmonary vascular congestion. Difficult to exclude superimposed infection. Attention on follow-up. XR CHEST PORTABLE    Result Date: 2/2/2023  No acute process. All radiological studies reviewed  Code Status:  Full Code        Electronically signed by Rafaela Harper MD on 2/7/2023 at 3:56 PM    This note was created with the assistance of a speech-recognition program.  Although the intention is to generate a document that actually reflects the content of the visit, no guarantees can be provided that every mistake has been identified and corrected by editing. Note was updated later by me after  physical examination and  completion of the assessment.

## 2023-02-07 NOTE — PROGRESS NOTES
Patient remained stable during the day, did c/o ongoing headache throughout the day, relieved by tylenol/tramadol prn. Sister at bedside now, will pass information onto night shift and continue to monitor closely.

## 2023-02-07 NOTE — PROGRESS NOTES
[address] [employer] [address]  [phone]          Progress Note    Patient Name:  Cynthia Burkett    :  1960 7:19 AM      SUBJECTIVE       Ms. Claudetta Riedel  denies SOB or CP. OBJECTIVE     Vital signs:    /87   Pulse 93   Temp 97.1 °F (36.2 °C) (Temporal)   Resp 23   Ht 5' 11\" (1.803 m)   Wt 261 lb 7 oz (118.6 kg)   SpO2 93%   BMI 36.46 kg/m²  2 L/min  .tro    Admit Weight:  260 lb 6.4 oz (118.1 kg)    Last 3 weights: Wt Readings from Last 3 Encounters:   23 261 lb 7 oz (118.6 kg)   23 200 lb (90.7 kg)       BMI: Body mass index is 36.46 kg/m². Input/Output:       Intake/Output Summary (Last 24 hours) at 2023 0719  Last data filed at 2023 0400  Gross per 24 hour   Intake 950 ml   Output 1600 ml   Net -650 ml       Date 23 0000 - 23 2359   Shift 4713-6670 7643-1033 6173-8037 24 Hour Total   INTAKE   P.O.(mL/kg/hr) 120   120   Shift Total(mL/kg) 120(1)   120(1)   OUTPUT   Urine(mL/kg/hr) 200   200   Shift Total(mL/kg) 200(1.7)   200(1.7)   Weight (kg) 118.6 118.6 118.6 118.6     Exam:     General appearance: NAD   Lungs: fairly clear  Heart: Reg S1S2  Extremities: bilat. Lower ext. edema        Laboratory Studies:     CBC:   Recent Labs     23  0559 23  03123   WBC 6.0 5.3 6.0   HGB 11.5* 11.1* 11.7*   HCT 36.9 35.8* 37.1   MCV 87.9 88.0 86.5   * 133* 132*     BMP:   Recent Labs     23  0559 23    134* 134*   K 3.5* 3.4* 3.9    101 102   CO2 26 24 22   BUN 26* 23 20   CREATININE 1.01* 0.92* 0.94*     PT/INR:   Recent Labs     23  0559   PROTIME 19.9*   INR 1.7     APTT: No results for input(s): APTT in the last 72 hours. MAG:   Recent Labs     23  0559 23  0315   MG 1.6 1.6     D Dimer: No results for input(s): DDIMER in the last 72 hours.   Troponin    Recent Labs     23  1851 23  2033   TROPHS 33* 49*                BNP No results for input(s): BNP in the last 72 hours. Recent Labs     23  1851 23  2142   PROBNP 2,364* 1,357*         Pulse Ox: SpO2  Av %  Min: 93 %  Max: 98 %  Supplemental O2: O2 Flow Rate (L/min): 2 L/min     Current Meds:    apixaban  5 mg Oral BID    atorvastatin  20 mg Oral Nightly    metoprolol succinate  50 mg Oral Daily    QUEtiapine  25 mg Oral Nightly    sertraline  25 mg Oral Daily    spironolactone  25 mg Oral Daily    fenofibrate  54 mg Oral Daily    glipiZIDE  2.5 mg Oral QAM AC    omega-3 acid ethyl esters  1 g Oral BID    insulin lispro  0-8 Units SubCUTAneous TID WC    insulin lispro  0-4 Units SubCUTAneous Nightly    adenosine  6 mg IntraVENous Once    sodium chloride flush  5-40 mL IntraVENous 2 times per day    furosemide  40 mg IntraVENous Daily     Continuous Infusions:    dextrose      sodium chloride           XR RADIUS ULNA LEFT (2 VIEWS)    Result Date: 2023  No acute osseous abnormality. XR CHEST PORTABLE    Result Date: 2023  Findings favoring moderate CHF pattern pulmonary vascular congestion. Difficult to exclude superimposed infection. Attention on follow-up. XR CHEST PORTABLE    Result Date: 2023  No acute process. Echo:CONCLUSIONS     Summary  Mild to moderate left ventricular hypertrophy  Global left ventricular systolic function is normal  Estimated ejection fraction is 65 % . Mitral annular calcification is seen. Mean gradient is 9 mmHg. Mild mitral stenosis. However, the mitral valve  appears to open well. No pericardial effusion seen. Normal aortic root dimension. ASSESSMENT     Principal Problem:    New onset of congestive heart failure ([address]  Resolved Problems:    * No resolved hospital problems. *      #1 probably Acute on chronic HFpEF. Patient not on loop diuretic as outpatient. Will need low dose lasix 20mg daily on discharge.   Continue metoprolol succinate, spironolactone as outpatient. Echo reviewed. There is no visual evidence of any significant mitral valve stenosis as valve opens well. 2 primary hypertension. Blood pressure is reasonably controlled. PLAN     Continue IV diuresis and monitor electrolytes and renal function.         Electronically signed by Tammie Bello MD on 2/7/2023 at 7:19 AM

## 2023-02-07 NOTE — PROGRESS NOTES
Physical Therapy  Facility/Department: [facility] CVICU  Daily Treatment Note  NAME: Lilly Lay  : 1960  MRN: [id number]    Date of Service: 2023    Discharge Recommendations:  Patient would benefit from continued therapy after discharge    Pt currently functioning below baseline. Recommend daily inpatient skilled therapy at time of discharge to maximize long term outcomes and prevent re-admission. Please refer to AM-PAC score for current level of function. Patient Diagnosis(es): The primary encounter diagnosis was Paroxysmal supraventricular tachycardia ([address] A diagnosis of Acute congestive heart failure, unspecified heart failure type [facility]) was also pertinent to this visit. Assessment   Assessment: Patient is currently unsafe during ambulation with Max x 2 A as she attempts to sit several times even though she was not near the bed and not following directions well when give cues reguarding gait, RW mgmt and safety. Patient will required use of laine stedy for RN staff to mobilize the patient and even therapy staff to focus on standing balance and pre-gait mobility as gait was very unsafe today with 2 assistance. Activity Tolerance: Patient limited by endurance; Patient limited by fatigue   AM-PAC Score: 10  Plan    Physcial Therapy Plan  General Plan: 5-7 times per week  Current Treatment Recommendations: Strengthening;Balance training;Functional mobility training;Transfer training;Gait training; Endurance training     Restrictions  Restrictions/Precautions  Restrictions/Precautions: General Precautions, Fall Risk  Required Braces or Orthoses?: No  Position Activity Restriction  Other position/activity restrictions: Up w/ assist, telemetry, 1800 mL fluid restriction, external urinary catheter, RUE IV     Subjective    Subjective  Subjective: Patient agreeable for PT treatment with Max encouragment.   Orientation  Overall Orientation Status: Within Functional Limits  Cognition  Overall Cognitive Status: Exceptions  Arousal/Alertness: Delayed responses to stimuli;Inconsistent responses to stimuli  Following Commands: Follows one step commands with repetition; Follows one step commands with increased time  Attention Span: Attends with cues to redirect  Safety Judgement: Decreased awareness of need for assistance;Decreased awareness of need for safety  Problem Solving: Assistance required to generate solutions;Assistance required to implement solutions;Assistance required to identify errors made;Assistance required to correct errors made;Decreased awareness of errors  Insights: Decreased awareness of deficits  Initiation: Does not require cues  Sequencing: Requires cues for some     Objective   Bed Mobility Training  Bed Mobility Training: Yes  Overall Level of Assistance: Moderate assistance; Additional time  Interventions: Safety awareness training;Verbal cues; Visual cues  Rolling: Moderate assistance; Additional time  Supine to Sit: Moderate assistance; Additional time  Sit to Supine: Moderate assistance;Assist X2  Scooting: Maximum assistance; Assist X2  Comment: Patient required MAX VCs with hand over hand assist for UE placement on bed rail and to initiate proper log roll tech, extended assistance required for line mgmt. Assist for scooting to EOB and achieve upright posture with UB. Assist and VCs for scooting to EOB with rosmery LE foot placement on floor to establish safety sitting balance therefore reducing fall risk. Balance  Sitting: With support (CGA- Mod A for posterior lean)  Standing: With support (w/ RW and Max x 2 A)  Transfer Training  Transfer Training: Yes  Overall Level of Assistance: Moderate assistance;Assist X2  Interventions: Safety awareness training;Verbal cues; Visual cues; Tactile cues  Sit to Stand:  Moderate assistance;Assist X2  Stand to Sit: Moderate assistance;Assist X2  Bed to Chair: Moderate assistance;Assist X2  Comment: Patient requires VCs and tactile cues to use arms of chairs to push up from for sit to stand transfer, instead of pulling of RW, to promote safety and correct transfer technique. Gait Training  Gait Training: Yes  Gait  Overall Level of Assistance: Maximum assistance;Assist X2;Additional time   Interventions: Safety awareness training;Verbal cues; Tactile cues; Visual cues  Distance (ft):  (3' x 2)  Assistive Device: Gait belt;Walker, rolling  Comment: (Explained to patient that writer just wanted her to take 2-3 steps forward then backwards to EOB. Patient able to take 3 steps forward with significant posterior lean noted. When writer attempt to assist patient with RW mgmt as she ambulating too far from RW JENNIFER, she became easily agitated, yelling out at [facility]. VCs to take steps retro back to the EOB. She kept trying to walk further but writer trying to stop patient and mange lines. Several times patient attempts to sit down when she was 3-4 feet away from the bed, and was therefore half sitting on writer's leg so she would not fall on the ground, 2nd assistance trying patient to stand back fully upright. Finally able to get patient to turn to the Rt and walk toward the bed, difficulties managing RW and difficulties advancing Lt LE during turn. No safety awareness.)  Neuromuscular Education  Neuromuscular Education: Yes  Treatment: Gait ;Standing;Sitting  Neuromuscular Comments: VCs req for proper breathing jun (pursed lip breathing) during functional mobility. Tactile and VCs req for postural control during sit<>stands & amb to promote abdominal and erector spinae mm facilitation for increased stability and balance, decreasing kyphosis of the spine. Pt req VCs to correct for forward WS with squatting in addition to pressing firmly into ground with feet, to promote the appropriate body mechanics for sit<>stand transfers. Safety Devices  Type of Devices: Gait belt;Call light within reach;Nurse notified; Patient at risk for falls; All fall risk precautions in place; Bed alarm in place; Left in bed  Restraints  Restraints Initially in Place: No       Goals  Short Term Goals  Time Frame for Short Term Goals: 12 treatments  Short Term Goal 1: Independent bed mobility/transfers  Short Term Goal 2: SBA ambulation w/ ' x 1  Short Term Goal 3: Good balance/posture  Short Term Goal 4: 5/5 B LE's  Short Term Goal 5: Tolerate 30 min ther act  Patient Goals   Patient Goals : Per family, feel better    Education  Patient Education  Education Given To: Patient; Family  Education Provided: Role of Therapy;Plan of Care;Home Exercise Program;Fall Prevention Strategies  Education Method: Demonstration;Verbal  Barriers to Learning: Cognition  Education Outcome: Continued education needed    Therapy Time   Individual Concurrent Group Co-treatment   Time In 1300         Time Out [address], [place]

## 2023-02-07 NOTE — PLAN OF CARE

## 2023-02-07 NOTE — PROGRESS NOTES
Occupational Therapy  Facility/Department: [facility] CVICU  Occupational Therapy Initial Assessment    Name: Loren Brewer  : 1960  MRN: [id number]  Date of Service: 2023    RN Pito Persons report patient is medically stable for therapy treatment this date. Chart reviewed prior to treatment and patient is agreeable for therapy. All lines intact and patient positioned comfortably at end of treatment. All patient needs addressed prior to ending therapy session. Pt currently functioning below baseline. Recommend daily inpatient skilled therapy at time of discharge to maximize long term outcomes and prevent re-admission. Please refer to AM-PAC score for current level of function. Discharge Recommendations:  Patient would benefit from continued therapy after discharge  OT Equipment Recommendations  Equipment Needed:  (CTA)       Per H&P: Loren Brewer is a 60 y.o.  female who presents with Fall (Two falls at group home today) and Extremity Weakness (Ambulates with walker but group home concerned pt is unable to get around anymore )     Patient admitted to the emergency room where she presented after a fall. Patient lives at a group home where she had fallen twice at. Patient uses a walker to ambulate and per staff it was difficult for her to ambulate recently. Per staff patient did not fall on her back hips chest or neck. She complained of weakness and was slowly going down onto the floor while holding her walker. Patient has been complaining of progressive dyspnea over the last few days. Patient has cognitive impairment and is unable to relate a complete history. Much of the information is obtained through ED attending, nursing and the patient's chart. Patient says she does feel better in the last few hours. She denies any chest pain, nausea, vomiting or abdominal pain.   Denies diarrhea or dysuria    Patient Diagnosis(es): The primary encounter diagnosis was Paroxysmal supraventricular tachycardia ([facility] [address] A diagnosis of Acute congestive heart failure, unspecified heart failure type [facility]) was also pertinent to this visit. Past Medical History:  has a past medical history of Cerebral artery occlusion with cerebral infarction ([facility] [address] Diabetes mellitus ([facility] [address] HTN (hypertension), and Mental disability. Past Surgical History:  has no past surgical history on file. Assessment   Performance deficits / Impairments: Decreased functional mobility ; Decreased safe awareness;Decreased balance;Decreased coordination;Decreased ADL status; Decreased cognition;Decreased endurance;Decreased strength;Decreased fine motor control  Assessment: Pt presents w/ deficits in functional mobility and ADL status secondary to generalized weakness, decreased functional activity tolerance, fatigue, decreased cognition/increased confusion, and decreased static/dynamic balance. Pt demo'd Poor safety awareness w/ RW at this time during transfer from EOB>bedside chair, requiring 2 staff assist. Recommended use of laine pang w/ nursing staff for transfer back to bed to maintain safety. Skilled OT services are indicated at this time to maximize this pt's safety and IND with self care and to facilitate safe return to [facility] as able. Prognosis: Fair;Good  Decision Making: Medium Complexity  REQUIRES OT FOLLOW-UP: Yes  Activity Tolerance  Activity Tolerance: Patient limited by fatigue;Treatment limited secondary to decreased cognition  Activity Tolerance Comments: Overall, pt had Poor tolerance for activity this date, limited by increased fatiuge, headache pain and decreased cognition. Standing tolerance ~60 seconds.         Plan   Occupational Therapy Plan  Times Per Week: 4-5x/week 1-2x/day as tolerated  Current Treatment Recommendations: Strengthening, Balance training, Functional mobility training, Endurance training, Safety education & training, Neuromuscular re-education, Patient/Caregiver education & training, Self-Care / ADL, Cognitive/Perceptual training, Equipment evaluation, education, & procurement, Coordination training     Restrictions  Restrictions/Precautions  Restrictions/Precautions: General Precautions, Fall Risk  Required Braces or Orthoses?: No  Position Activity Restriction  Other position/activity restrictions: Up w/ assist, telemetry, 1800 mL fluid restriction, external urinary catheter, RUE IV    Subjective   General  Chart Reviewed: Yes  Patient assessed for rehabilitation services?: Yes  Family / Caregiver Present: No  Subjective  Subjective: Pt sleeping in bed upon entry room, adamantly refusing participation in therapy evaluation at this time (1010), stating \"I just need to nap for 10 mins\", despite max education and encouragment from writer. Agreeable to writer returning at a later time. Writer returned this afternoon ([phone]), w/ pt reporting she was able to get some rest and agreeable to sitting up OOB in chair for lunch. Pt c/o headache at this time - RN aware and present in room at the start of session to administer available pain medication.      Social/Functional History  Social/Functional History  Lives With: Family (Brother)  Type of Home: House  Home Layout: One level  Home Access: Stairs to enter with rails  Entrance Stairs - Number of Steps: 2  Entrance Stairs - Rails: Left  Bathroom Shower/Tub: Tub/Shower unit  Bathroom Toilet: Standard  Bathroom Equipment: Grab bars in shower, Shower chair, Toilet raiser  Bathroom Accessibility: Accessible  Home Equipment: Janey Minors, 4 wheeled, Walker, rolling  Has the patient had two or more falls in the past year or any fall with injury in the past year?: Yes  Receives Help From: Family  ADL Assistance: Needs assistance  Homemaking Responsibilities: No  Ambulation Assistance: Independent (w/ RW)  Transfer Assistance: Independent  Active : No  Mode of Transportation: Family       Objective           Observation/Palpation  Posture: Fair (Seated at EOB)  Observation: BMI 36, purewick catheter, on RA throughout session w/ SpO2 remaining WFLs throughout activity; pt soiled upon entry to room - unaware when asked  Safety Devices  Type of Devices: Gait belt;Call light within reach;Nurse notified; Chair alarm in place; Left in chair;Patient at risk for falls; All fall risk precautions in place    Bed Mobility Training  Bed Mobility Training: Yes  Overall Level of Assistance: Moderate assistance; Additional time (w/ HOB elevated; pt w/ significant difficulty w/ bed mobility tasks this date, requiring increased time & effort; pt initially refusing writer assist, stating \"don't rush me! \")  Interventions: Safety awareness training;Verbal cues; Visual cues (Mod cues for progression of BLEs, use of bedrails, upright posture, pursed lip breathing tech, and squaring hips to EOB and placing feet flat on the floor to form a stable JENNIFER, all to increase overall safety.)  Supine to Sit: Moderate assistance; Additional time (Assist provided to bring BLEs to EOB and assist trunk into an upright position)  Sit to Supine:  (Not observed, pt retired to bedside chair at session end.)    Balance  Sitting: Intact (CGA while seated at EOB)  Standing: With support (ModA w/ RW d/t heavy posterior lean in standing)    Transfer Training  Transfer Training: Yes  Overall Level of Assistance: Moderate assistance;Assist X1 (Pt complete STS transfer from EOB; pt w/ heavy posterior lean in standing and w/ Poor safety awareness w/ RW)  Interventions: Safety awareness training;Verbal cues; Visual cues; Tactile cues (Max cues for hand placement on stable surfaces, weightshifting forward/nose over toes, upright posture, pursed lip breathing tech, squaring AD to transfer surface, and slow/controlled sit<>stands all to increase overall safety and reduce fall risk.)  Sit to Stand:  Moderate assistance;Assist X1  Stand to Sit: Moderate assistance;Assist X1  Bed to Chair: Moderate assistance;Assist X2    Functional Mobility  Overall Level of Assistance: Moderate assistance;Assist X2 (Pt able to complete several small steps from EOB>bedside w/ RW; pt demo'd Poor safety awareness w/ RW, requiring cues for increase safety and writer to physically maintain JENNIFER within RW during transfer. Pt w/ some difficulty sequencing steps to chair)  Interventions: Safety awareness training;Verbal cues; Tactile cues; Visual cues (Max cues for upright posture/correcting posterior lean, maintaining JENNIFER within RW, RW safety/mgnt, sequencing steps for transfer, and squaring AD to transfer surface before sitting down, all to increase overall safety/reduce fall risk.)  Assistive Device: Gait belt;Walker, rolling     AROM: Generally decreased, functional  Strength: Generally decreased, functional (MMT not formally assessed at this time; pt w/ some difficulty w/ following commands. Strength appeared generally decreased when pt was using BUE in function;)  Coordination: Generally decreased, functional (Pt unable to open packaging and containers for items on lunch tray, required assistance)  Tone: Abnormal (Low tone in BUEs)    ADL  Feeding: Minimal assistance;Setup  Feeding Skilled Clinical Factors: Ham to prepare items on lunch tray for pt d/t decreased [address] [place] and  strength. Pt able to bring hand to mouth to finger feed; food spillage noted and cues provided to slow down, as pt ate at a quick pace. Grooming: Minimal assistance; Moderate assistance;Setup  UE Bathing: Moderate assistance;Maximum assistance  UE Bathing Skilled Clinical Factors: ModA to engage in UB while seated at EOB  LE Bathing: Maximum assistance;Dependent/Total  LE Bathing Skilled Clinical Factors:  Total assist provided to pt for LB bathing of yamini area/bottom while supine in bed and standing at EOB w/ use of RW; pt demo'd a heavy posterior lean in standing and was unsteady at times requiring ModAx1 to correct balance  UE Dressing: Maximum assistance  UE Dressing Skilled Clinical Factors: MaxA to doff soiled gown and to don clean gown  LE Dressing: Dependent/Total  LE Dressing Skilled Clinical Factors: To doff soiled brief in supine and to don clean brief while standing at bedside chair; pt required heavy use of BUEs on RW and ModAx1 to maintain standing balance  Toileting: Dependent/Total  Toileting Skilled Clinical Factors: Total assist provided to pt for brief mgnt and personal hygiene during toileting tasks. Additional Comments: Pt's participation in ADLs limited d/t generalized weakness, decreased functional activity tolerance, fatigue, decreased static/dynamic balance, and decreased cognition. Pt initially required max education and encouragement to participate in therapy session d/t increased fatigue and pain from a headache. Pt required a lot of physical assist for all ADLs at this time and demo'd a heavy posterior lean while standing at EOB and attempting to take steps to bedside chair. Pt displayed mod difficulty w/ sequencing tasks at time of eval, requiring continuous verbal, visual and tactile cues to complete transfer from EOB> bedside chair. Recommend use of laine pang w/ nursing staff to assist pt back to bed at this time d/t pt's increased confusion and generalized weakness. Vision  Vision: Impaired  Vision Exceptions: Wears glasses at all times  Hearing  Hearing: Within functional limits    Cognition  Overall Cognitive Status: Exceptions  Arousal/Alertness: Delayed responses to stimuli;Inconsistent responses to stimuli  Following Commands: Follows one step commands with repetition; Follows one step commands with increased time  Attention Span: Attends with cues to redirect  Safety Judgement: Decreased awareness of need for assistance;Decreased awareness of need for safety  Problem Solving: Assistance required to generate solutions;Assistance required to implement solutions;Assistance required to identify errors made;Assistance required to correct errors made;Decreased awareness of errors  Insights: Decreased awareness of deficits  Initiation: Does not require cues  Sequencing: Requires cues for some  Perception  Overall Perceptual Status: Impaired  Initiation: Cues to initiate tasks  Motor Planning: Hand over hand to sequence tasks               Education Given To: Patient  Education Provided: Role of Therapy;Transfer Training;Equipment;Plan of Care; Fall Prevention Strategies; ADL Adaptive Strategies;Precautions  Education Method: Demonstration;Verbal  Barriers to Learning: Cognition  Education Outcome: Continued education needed            AM-PAC Score        AM-[facility] Inpatient Daily Activity Raw Score: 11 (02/07/23 1256)  AM-PAC Inpatient ADL T-Scale Score : 29.04 (02/07/23 1256)  ADL Inpatient CMS 0-100% Score: 70.42 (02/07/23 1256)  ADL Inpatient CMS G-Code Modifier : CL (02/07/23 1256)      Goals  Short Term Goals  Time Frame for Short Term Goals: By discharge, pt to demo:  Short Term Goal 1: bed mobility tasks w/ SBA and Good safety with use of bedrails/bed controls as needed. Short Term Goal 2: ADL transfers and functional mobility tasks w/ CGA and Good safety with use of AD as needed. Short Term Goal 3: increased BUE strength by 1/2 grade to promote functional strength and ROM required for safety and IND with self care tasks. Short Term Goal 4: UB ADLs to MinAx1 and LB ADLs to ModAx1 with Good safety and use of AE/AD as needed. Short Term Goal 5: toileting tasks to ModAx1 and Good safety with use of AD/grab bars/BSC as needed. Long Term Goals  Long Term Goal 1: Pt to demo increased standing tolerance to > 10 mins to promote functional activity tolerance and balance required to reduce the risk of falls in function. Long Term Goal 2: Caregivers to be IND with fall prevention strategies, EC/WS tech, compensatory ADL strategies, discharge/equipment recommendations, and a BUE HEP with use of handouts as needed.   Patient Goals   Patient goals : To go home!        Therapy Time   Individual Individual Group Co-treatment   Time In [id number]       Time Out [id number]       Minutes 10 45       Total Time: 55 minutes; Treatment Time: 40 minutes       Jamie Conteh, OT

## 2023-02-07 NOTE — PROGRESS NOTES
End Of Shift Note  [address] CVICU  Summary of shift: Patient had two episodes of confusion where she was attempting to get out of bed. Third side rail placed to prevent her legs from getting out of bed. When asked if she wanted to get into the chair (as she had done with PT/OT), Patient shook her head no. She is easily reoriented and convinced to get back into bed. Dayshift RN reported that her sister, Jacey Polk, had come to visit and Patient stated \"who are you\", which she informed RN was her usual symptom of a UTI. Message sent to NP Brielle Gutierrez for urine culture order. Awaiting response. Yesterday's Echo results stated:   \"Summary  Mild to moderate left ventricular hypertrophy  Global left ventricular systolic function is normal  Estimated ejection fraction is 65 %   Mitral annular calcification is seen. Mean gradient is 9 mmHg. Mild mitral stenosis. However, the mitral valve  appears to open well. No pericardial effusion seen. Normal aortic root dimension. \"    Vitals:    Vitals:    02/06/23 2000 02/07/23 0000 02/07/23 0021 02/07/23 0400   BP: 126/77 118/69  118/87   Pulse: 81 93  93   Resp: 15 20  23   Temp: 97 °F (36.1 °C) 97.1 °F (36.2 °C)  97.1 °F (36.2 °C)   TempSrc: Temporal Temporal  Temporal   SpO2: 95%   93%   Weight:   261 lb 7 oz (118.6 kg)    Height:            I&O:   Intake/Output Summary (Last 24 hours) at 2/7/2023 0554  Last data filed at 2/7/2023 0400  Gross per 24 hour   Intake 950 ml   Output 1850 ml   Net -900 ml       Resp Status: RA    Ventilator Settings:     / / /     Critical Care IV infusions:   dextrose      sodium chloride          LDA:   Peripheral IV 02/04/23 Right Forearm (Active)   Number of days: 2       External Urinary Catheter (Active)   Number of days: 2

## 2023-02-07 NOTE — FLOWSHEET NOTE
02/07/23 0600   Treatment Team Notification   Reason for Communication Evaluate; Review case   Team Member Name  [address]   Treatment Team Role Advanced Practice Nurse   Method of Communication Secure Message   Notification Time 0600      RE: Pepito Steel Patient has become increasingly confused. She had two instances of confusion this evening compared to none the previous evening. Her sister had visited yesterday and reported that the Patient stated Katlyn Flakes are you? \" The sister notified dayshift RN that this is her telltale sign of a UTI. Will you please order a urine culture to be collected? Thank you. Unread at this time.

## 2023-02-07 NOTE — PLAN OF CARE
Problem: Chronic Conditions and Co-morbidities  Goal: Patient's chronic conditions and co-morbidity symptoms are monitored and maintained or improved  2/6/2023 2314 by Sea Oshea RN  Outcome: Progressing  Flowsheets (Taken 2/6/2023 2000)  Care Plan - Patient's Chronic Conditions and Co-Morbidity Symptoms are Monitored and Maintained or Improved:   Monitor and assess patient's chronic conditions and comorbid symptoms for stability, deterioration, or improvement   Collaborate with multidisciplinary team to address chronic and comorbid conditions and prevent exacerbation or deterioration   Update acute care plan with appropriate goals if chronic or comorbid symptoms are exacerbated and prevent overall improvement and discharge  2/6/2023 1129 by Key Flores RN  Outcome: Progressing  [facility] (Taken 2/5/2023 1547)  Care Plan - Patient's Chronic Conditions and Co-Morbidity Symptoms are Monitored and Maintained or Improved:   Collaborate with multidisciplinary team to address chronic and comorbid conditions and prevent exacerbation or deterioration   Monitor and assess patient's chronic conditions and comorbid symptoms for stability, deterioration, or improvement     Problem: Discharge Planning  Goal: Discharge to home or other facility with appropriate resources  2/6/2023 2314 by Sea Oshea RN  Outcome: Progressing  Flowsheets (Taken 2/6/2023 2000)  Discharge to home or other facility with appropriate resources:   Identify barriers to discharge with patient and caregiver   Arrange for needed discharge resources and transportation as appropriate   Identify discharge learning needs (meds, wound care, etc)   Refer to discharge planning if patient needs post-hospital services based on physician order or complex needs related to functional status, cognitive ability or social support system  2/6/2023 1129 by Key Flores RN  Outcome: Progressing  Flowsheets (Taken 2/5/2023 1547)  Discharge to home or other facility with appropriate resources:   Identify discharge learning needs (meds, wound care, etc)   Identify barriers to discharge with patient and caregiver  Note: Planning to dc home with sister , 24 hours aid care      Problem: ABCDS Injury Assessment  Goal: Absence of physical injury  Outcome: Progressing     Problem: Skin/Tissue Integrity  Goal: Absence of new skin breakdown  Description: 1. Monitor for areas of redness and/or skin breakdown  2. Assess vascular access sites hourly  3. Every 4-6 hours minimum:  Change oxygen saturation probe site  4. Every 4-6 hours:  If on nasal continuous positive airway pressure, respiratory therapy assess nares and determine need for appliance change or resting period.   2/6/2023 2314 by Shonna Earl RN  Outcome: Progressing  2/6/2023 1129 by Jaida Bagley RN  Outcome: Progressing  Note: Interdry sheets placed into pannus /groin folds      Problem: Safety - Adult  Goal: Free from fall injury  2/6/2023 2314 by Shonna Earl RN  Outcome: Progressing  2/6/2023 1129 by Jaida Bagley RN  Outcome: Progressing  Flowsheets (Taken 2/6/2023 1129)  Free From Fall Injury: Instruct family/caregiver on patient safety

## 2023-02-08 LAB
ABSOLUTE EOS #: 0.03 K/UL (ref 0–0.44)
ABSOLUTE IMMATURE GRANULOCYTE: 0.03 K/UL (ref 0–0.3)
ABSOLUTE LYMPH #: 1.58 K/UL (ref 1.1–3.7)
ABSOLUTE MONO #: 0.78 K/UL (ref 0.1–1.2)
ANION GAP SERPL CALCULATED.3IONS-SCNC: 12 MMOL/L (ref 9–17)
BASOPHILS # BLD: 0 % (ref 0–2)
BASOPHILS ABSOLUTE: 0.03 K/UL (ref 0–0.2)
BNP SERPL-MCNC: 815 PG/ML
BUN SERPL-MCNC: 25 MG/DL (ref 8–23)
BUN/CREAT BLD: 30 (ref 9–20)
CALCIUM SERPL-MCNC: 8.8 MG/DL (ref 8.6–10.4)
CHLORIDE SERPL-SCNC: 96 MMOL/L (ref 98–107)
CO2 SERPL-SCNC: 23 MMOL/L (ref 20–31)
CREAT SERPL-MCNC: 0.82 MG/DL (ref 0.5–0.9)
EOSINOPHILS RELATIVE PERCENT: 0 % (ref 1–4)
GFR SERPL CREATININE-BSD FRML MDRD: >60 ML/MIN/1.73M2
GLUCOSE BLD-MCNC: 132 MG/DL (ref 65–105)
GLUCOSE BLD-MCNC: 184 MG/DL (ref 65–105)
GLUCOSE BLD-MCNC: 202 MG/DL (ref 65–105)
GLUCOSE BLD-MCNC: 209 MG/DL (ref 65–105)
GLUCOSE SERPL-MCNC: 153 MG/DL (ref 70–99)
HCT VFR BLD AUTO: 38.3 % (ref 36.3–47.1)
HGB BLD-MCNC: 12 G/DL (ref 11.9–15.1)
IMMATURE GRANULOCYTES: 0 %
LYMPHOCYTES # BLD: 19 % (ref 24–43)
MCH RBC QN AUTO: 27.1 PG (ref 25.2–33.5)
MCHC RBC AUTO-ENTMCNC: 31.3 G/DL (ref 28.4–34.8)
MCV RBC AUTO: 86.5 FL (ref 82.6–102.9)
MONOCYTES # BLD: 10 % (ref 3–12)
NRBC AUTOMATED: 0 PER 100 WBC
PDW BLD-RTO: 14.4 % (ref 11.8–14.4)
PLATELET # BLD AUTO: 145 K/UL (ref 138–453)
PMV BLD AUTO: 10.5 FL (ref 8.1–13.5)
POTASSIUM SERPL-SCNC: 3.6 MMOL/L (ref 3.7–5.3)
RBC # BLD: 4.43 M/UL (ref 3.95–5.11)
SEG NEUTROPHILS: 70 % (ref 36–65)
SEGMENTED NEUTROPHILS ABSOLUTE COUNT: 5.71 K/UL (ref 1.5–8.1)
SODIUM SERPL-SCNC: 131 MMOL/L (ref 135–144)
WBC # BLD AUTO: 8.2 K/UL (ref 3.5–11.3)

## 2023-02-08 PROCEDURE — 82947 ASSAY GLUCOSE BLOOD QUANT: CPT

## 2023-02-08 PROCEDURE — 6360000002 HC RX W HCPCS: Performed by: NURSE PRACTITIONER

## 2023-02-08 PROCEDURE — 2580000003 HC RX 258: Performed by: NURSE PRACTITIONER

## 2023-02-08 PROCEDURE — 97530 THERAPEUTIC ACTIVITIES: CPT

## 2023-02-08 PROCEDURE — 36415 COLL VENOUS BLD VENIPUNCTURE: CPT

## 2023-02-08 PROCEDURE — 83880 ASSAY OF NATRIURETIC PEPTIDE: CPT

## 2023-02-08 PROCEDURE — 6370000000 HC RX 637 (ALT 250 FOR IP): Performed by: HOSPITALIST

## 2023-02-08 PROCEDURE — 99222 1ST HOSP IP/OBS MODERATE 55: CPT | Performed by: NURSE PRACTITIONER

## 2023-02-08 PROCEDURE — 97535 SELF CARE MNGMENT TRAINING: CPT

## 2023-02-08 PROCEDURE — 85025 COMPLETE CBC W/AUTO DIFF WBC: CPT

## 2023-02-08 PROCEDURE — 2060000000 HC ICU INTERMEDIATE R&B

## 2023-02-08 PROCEDURE — 6370000000 HC RX 637 (ALT 250 FOR IP): Performed by: NURSE PRACTITIONER

## 2023-02-08 PROCEDURE — 80048 BASIC METABOLIC PNL TOTAL CA: CPT

## 2023-02-08 PROCEDURE — 6370000000 HC RX 637 (ALT 250 FOR IP): Performed by: FAMILY MEDICINE

## 2023-02-08 RX ORDER — TRAMADOL HYDROCHLORIDE 50 MG/1
50 TABLET ORAL EVERY 6 HOURS PRN
Status: DISCONTINUED | OUTPATIENT
Start: 2023-02-08 | End: 2023-02-10 | Stop reason: HOSPADM

## 2023-02-08 RX ORDER — FUROSEMIDE 40 MG/1
40 TABLET ORAL DAILY
Status: DISCONTINUED | OUTPATIENT
Start: 2023-02-08 | End: 2023-02-10 | Stop reason: HOSPADM

## 2023-02-08 RX ADMIN — SODIUM CHLORIDE, PRESERVATIVE FREE 10 ML: 5 INJECTION INTRAVENOUS at 08:55

## 2023-02-08 RX ADMIN — FUROSEMIDE 40 MG: 10 INJECTION, SOLUTION INTRAMUSCULAR; INTRAVENOUS at 08:49

## 2023-02-08 RX ADMIN — METOPROLOL SUCCINATE 50 MG: 50 TABLET, EXTENDED RELEASE ORAL at 08:54

## 2023-02-08 RX ADMIN — TRAMADOL HYDROCHLORIDE 50 MG: 50 TABLET, COATED ORAL at 05:55

## 2023-02-08 RX ADMIN — TRAMADOL HYDROCHLORIDE 50 MG: 50 TABLET, COATED ORAL at 18:30

## 2023-02-08 RX ADMIN — CEFTRIAXONE SODIUM 1000 MG: 1 INJECTION, POWDER, FOR SOLUTION INTRAMUSCULAR; INTRAVENOUS at 12:04

## 2023-02-08 RX ADMIN — ACETAMINOPHEN 650 MG: 325 TABLET ORAL at 16:49

## 2023-02-08 RX ADMIN — APIXABAN 5 MG: 5 TABLET, FILM COATED ORAL at 20:33

## 2023-02-08 RX ADMIN — INSULIN LISPRO 2 UNITS: 100 INJECTION, SOLUTION INTRAVENOUS; SUBCUTANEOUS at 13:49

## 2023-02-08 RX ADMIN — SODIUM CHLORIDE, PRESERVATIVE FREE 10 ML: 5 INJECTION INTRAVENOUS at 20:34

## 2023-02-08 RX ADMIN — APIXABAN 5 MG: 5 TABLET, FILM COATED ORAL at 08:54

## 2023-02-08 RX ADMIN — OMEGA-3-ACID ETHYL ESTERS 1 G: 1 CAPSULE, LIQUID FILLED ORAL at 08:54

## 2023-02-08 RX ADMIN — SERTRALINE 25 MG: 50 TABLET, FILM COATED ORAL at 08:54

## 2023-02-08 RX ADMIN — ACETAMINOPHEN 650 MG: 325 TABLET ORAL at 03:53

## 2023-02-08 RX ADMIN — GLIPIZIDE 2.5 MG: 5 TABLET ORAL at 05:54

## 2023-02-08 RX ADMIN — SPIRONOLACTONE 25 MG: 25 TABLET ORAL at 08:54

## 2023-02-08 RX ADMIN — TRAMADOL HYDROCHLORIDE 50 MG: 50 TABLET, COATED ORAL at 12:01

## 2023-02-08 RX ADMIN — ATORVASTATIN CALCIUM 20 MG: 20 TABLET, FILM COATED ORAL at 20:33

## 2023-02-08 RX ADMIN — ACETAMINOPHEN 650 MG: 325 TABLET ORAL at 10:01

## 2023-02-08 RX ADMIN — FUROSEMIDE 40 MG: 40 TABLET ORAL at 17:26

## 2023-02-08 RX ADMIN — CEFEPIME 2000 MG: 2 INJECTION, POWDER, FOR SOLUTION INTRAVENOUS at 05:59

## 2023-02-08 RX ADMIN — SODIUM CHLORIDE: 9 INJECTION, SOLUTION INTRAVENOUS at 16:56

## 2023-02-08 RX ADMIN — OMEGA-3-ACID ETHYL ESTERS 1 G: 1 CAPSULE, LIQUID FILLED ORAL at 20:33

## 2023-02-08 RX ADMIN — FENOFIBRATE 54 MG: 54 TABLET ORAL at 10:01

## 2023-02-08 RX ADMIN — QUETIAPINE FUMARATE 25 MG: 25 TABLET ORAL at 20:33

## 2023-02-08 ASSESSMENT — PAIN DESCRIPTION - LOCATION
LOCATION: HEAD

## 2023-02-08 ASSESSMENT — PAIN SCALES - GENERAL
PAINLEVEL_OUTOF10: 8
PAINLEVEL_OUTOF10: 4
PAINLEVEL_OUTOF10: 7
PAINLEVEL_OUTOF10: 10
PAINLEVEL_OUTOF10: 10
PAINLEVEL_OUTOF10: 5
PAINLEVEL_OUTOF10: 10
PAINLEVEL_OUTOF10: 10
PAINLEVEL_OUTOF10: 2
PAINLEVEL_OUTOF10: 8

## 2023-02-08 ASSESSMENT — PAIN DESCRIPTION - ORIENTATION
ORIENTATION: ANTERIOR
ORIENTATION: ANTERIOR

## 2023-02-08 ASSESSMENT — PAIN - FUNCTIONAL ASSESSMENT
PAIN_FUNCTIONAL_ASSESSMENT: ACTIVITIES ARE NOT PREVENTED

## 2023-02-08 ASSESSMENT — PAIN DESCRIPTION - DESCRIPTORS
DESCRIPTORS: ACHING

## 2023-02-08 NOTE — PROGRESS NOTES
[address] Hospitalist        2/8/2023   4:35 PM    Name:  Loren Brewer  MRN:    [id number]     Acct:     [de-identified]   Room:  2035/2035-01  IP Day: 4     Admit Date: 2/4/2023  6:38 PM  PCP: Galindo Blunt MD    C/C:   Chief Complaint   Patient presents with    Fall     Two falls at group home today    Extremity Weakness     Ambulates with walker but group home concerned pt is unable to get around anymore        Assessment:        Acute diastolic congestive heart failure  Elevated troponin  Paroxysmal supraventricular tachycardia  Hypertension  Hyperlipidemia  Diabetes type 2  History of CVA  UTI      Plan:        Patient is currently in CVU  O2 maintain oxygen saturation greater than 92%    Monitor respiratory status  Lasix 40 mg IV daily we will switch to p.o.  Echocardiogram  Serial troponin  IV Lopressor as needed  Cardiology consult      UA positive  Urine culture  IV ceftriaxone  ID on consult    DVT and GI prophylaxis  Continue to monitor/telemetry/CBC with differential daily/BMP daily  Continue medications as below    Discharge planning     Scheduled Meds:   cefTRIAXone (ROCEPHIN) IV  1,000 mg IntraVENous Q24H    apixaban  5 mg Oral BID    atorvastatin  20 mg Oral Nightly    metoprolol succinate  50 mg Oral Daily    QUEtiapine  25 mg Oral Nightly    sertraline  25 mg Oral Daily    spironolactone  25 mg Oral Daily    fenofibrate  54 mg Oral Daily    glipiZIDE  2.5 mg Oral QAM AC    omega-3 acid ethyl esters  1 g Oral BID    insulin lispro  0-8 Units SubCUTAneous TID WC    insulin lispro  0-4 Units SubCUTAneous Nightly    adenosine  6 mg IntraVENous Once    sodium chloride flush  5-40 mL IntraVENous 2 times per day    furosemide  40 mg IntraVENous Daily     Continuous Infusions:   dextrose      sodium chloride       PRN Meds:  traMADol, 50 mg, Q6H PRN  hydrOXYzine HCl, 25 mg, Nightly PRN  glucose, 4 tablet, PRN  dextrose bolus, 125 mL, PRN   Or  dextrose bolus, 250 mL, PRN  glucagon (rDNA), 1 mg, PRN  dextrose, , Continuous PRN  sodium chloride flush, 10 mL, PRN  sodium chloride, , PRN  potassium chloride, 40 mEq, PRN   Or  potassium alternative oral replacement, 40 mEq, PRN   Or  potassium chloride, 10 mEq, PRN  magnesium sulfate, 1,000 mg, PRN  ondansetron, 4 mg, Q8H PRN   Or  ondansetron, 4 mg, Q6H PRN  polyethylene glycol, 17 g, Daily PRN  acetaminophen, 650 mg, Q6H PRN   Or  acetaminophen, 650 mg, Q6H PRN          Subjective:     Patient seen and examined at bedside. No overnight events. No acute complaints today. Afebrile  Pt. Denies any CP, SOB, palpitation, HA, dizziness, chills, cough, cold, changes in urination, BM or skin changes or any pain. ROS:  A 10 point system reviewed and negative otherwise mentioned above. Physical Examination:      Vitals:  /74   Pulse 75   Temp 97.7 °F (36.5 °C) (Temporal)   Resp 16   Ht 5' 11\" (1.803 m)   Wt 258 lb 8 oz (117.3 kg)   SpO2 94%   BMI 36.05 kg/m²   Temp (24hrs), Av.3 °F (36.3 °C), Min:97 °F (36.1 °C), Max:97.7 °F (36.5 °C)    Weight:   Wt Readings from Last 3 Encounters:   23 258 lb 8 oz (117.3 kg)   23 200 lb (90.7 kg)     I/O last 3 completed shifts:  I/O last 3 completed shifts:   In: 1080 [P.O.:1080]  Out: 450 [Urine:450]     Recent Labs     23  1655 23  0753 23  1225   POCGLU 259* 142* 132* 202*           General appearance - alert, well appearing, and in no acute distress  Mental status - oriented to person, place, and time with normal affect  Head - normocephalic and atraumatic  Eyes - pupils equal and reactive, extraocular eye movements intact, conjunctiva clear  Ears - hearing appears to be intact  Nose - no drainage noted  Mouth - mucous membranes moist  Neck - supple, no carotid bruits, thyroid not palpable  Chest - clear to auscultation, normal effort  Heart - normal rate, regular rhythm, no murmur  Abdomen - soft, nontender, nondistended, bowel sounds present all four quadrants, no masses, hepatomegaly or splenomegaly  Neurological - normal speech, no focal findings or movement disorder noted, cranial nerves II through XII grossly intact  Extremities - peripheral pulses palpable, no pedal edema or calf pain with palpation  Skin - no gross lesions, rashes, or induration noted        Medications:      Allergies: No Known Allergies    Current Meds:   Current Facility-Administered Medications:     traMADol (ULTRAM) tablet 50 mg, 50 mg, Oral, Q6H PRN, Darya Hanley MD, 50 mg at 02/08/23 1201    cefTRIAXone (ROCEPHIN) 1,000 mg in sodium chloride 0.9 % 50 mL IVPB (mini-bag), 1,000 mg, IntraVENous, Q24H, Laddonia Edgar, APRN - CNP, Stopped at 02/08/23 1354    apixaban (ELIQUIS) tablet 5 mg, 5 mg, Oral, BID, Quentin Bustos MD, 5 mg at 02/08/23 0854    atorvastatin (LIPITOR) tablet 20 mg, 20 mg, Oral, Nightly, Quentin Bustos MD, 20 mg at 02/07/23 2100    hydrOXYzine HCl (ATARAX) tablet 25 mg, 25 mg, Oral, Nightly PRN, Quentin Bustos MD    metoprolol succinate (TOPROL XL) extended release tablet 50 mg, 50 mg, Oral, Daily, Quentin Bustos MD, 50 mg at 02/08/23 0854    QUEtiapine (SEROQUEL) tablet 25 mg, 25 mg, Oral, Nightly, Quentin Bustos MD, 25 mg at 02/07/23 2100    sertraline (ZOLOFT) tablet 25 mg, 25 mg, Oral, Daily, Quentin Bustos MD, 25 mg at 02/08/23 0854    spironolactone (ALDACTONE) tablet 25 mg, 25 mg, Oral, Daily, Quentin Bustos MD, 25 mg at 02/08/23 0854    fenofibrate (TRICOR) tablet 54 mg, 54 mg, Oral, Daily, Quentin Bustos MD, 54 mg at 02/08/23 1001    glipiZIDE (GLUCOTROL) tablet 2.5 mg, 2.5 mg, Oral, QAM AC, Quentin Bustos MD, 2.5 mg at 02/08/23 0554    omega-3 acid ethyl esters (LOVAZA) capsule 1 g, 1 g, Oral, BID, Quentin Bustos MD, 1 g at 02/08/23 0854    insulin lispro (HUMALOG) injection vial 0-8 Units, 0-8 Units, SubCUTAneous, TID WC, Quentin Bustos MD, 2 Units at 02/08/23 1349    insulin lispro (HUMALOG) injection vial 0-4 Units, 0-4 Units, SubCUTAneous, Nightly, Quentin Bustos MD    glucose chewable tablet 16 g, 4 tablet, Oral, PRN, Quentin Bustos MD    dextrose bolus 10% 125 mL, 125 mL, IntraVENous, PRN **OR** dextrose bolus 10% 250 mL, 250 mL, IntraVENous, PRN, Quentin Bustos MD    glucagon (rDNA) injection 1 mg, 1 mg, SubCUTAneous, PRN, Quentin Bustos MD    dextrose 10 % infusion, , IntraVENous, Continuous PRN, Quentin Bustos MD    adenosine (ADENOCARD) injection 6 mg, 6 mg, IntraVENous, Once, Christel Alexander MD    sodium chloride flush 0.9 % injection 5-40 mL, 5-40 mL, IntraVENous, 2 times per day, Mitra A Lump, APRN - CNP, 10 mL at 02/08/23 0855    sodium chloride flush 0.9 % injection 10 mL, 10 mL, IntraVENous, PRN, Mitra A Lump, APRN - CNP, 10 mL at 02/06/23 2212    0.9 % sodium chloride infusion, , IntraVENous, PRN, Lord Danielson Lump, APRN - CNP    potassium chloride (KLOR-CON M) extended release tablet 40 mEq, 40 mEq, Oral, PRN, 40 mEq at 02/06/23 0749 **OR** potassium bicarb-citric acid (EFFER-K) effervescent tablet 40 mEq, 40 mEq, Oral, PRN **OR** potassium chloride 10 mEq/100 mL IVPB (Peripheral Line), 10 mEq, IntraVENous, PRN, Mitra A Lump, APRN - CNP    magnesium sulfate 1000 mg in dextrose 5% 100 mL IVPB, 1,000 mg, IntraVENous, PRN, Mitra A Lump, APRN - CNP    ondansetron (ZOFRAN-ODT) disintegrating tablet 4 mg, 4 mg, Oral, Q8H PRN, 4 mg at 02/06/23 1614 **OR** ondansetron (ZOFRAN) injection 4 mg, 4 mg, IntraVENous, Q6H PRN, Mitra A Lump, APRN - CNP    polyethylene glycol (GLYCOLAX) packet 17 g, 17 g, Oral, Daily PRN, Mitra A Lump, APRN - CNP    acetaminophen (TYLENOL) tablet 650 mg, 650 mg, Oral, Q6H PRN, 650 mg at 02/08/23 1001 **OR** acetaminophen (TYLENOL) suppository 650 mg, 650 mg, Rectal, Q6H PRN, Mitra A Lump, APRN - CNP    furosemide (LASIX) injection 40 mg, 40 mg, IntraVENous, Daily, Mitra A Lump, APRN - CNP, 40 mg at 02/08/23 0849      I/O (24Hr):     Intake/Output Summary (Last 24 hours) at 2/8/2023 1635  Last data filed at 2/8/2023 1322  Gross per 24 hour   Intake 780 ml   Output 850 ml   Net -70 ml         Data:           Labs:    Hematology:  Recent Labs     02/06/23 0315 02/07/23 0523 02/08/23 0335   WBC 5.3 6.0 8.2   RBC 4.07 4.29 4.43   HGB 11.1* 11.7* 12.0   HCT 35.8* 37.1 38.3   MCV 88.0 86.5 86.5   MCH 27.3 27.3 27.1   MCHC 31.0 31.5 31.3   RDW 14.5* 14.5* 14.4   * 132* 145   MPV 10.6 10.6 10.5       Chemistry:  Recent Labs     02/06/23 0315 02/06/23 2142 02/07/23 0523 02/08/23 0335   *  --  134* 131*   K 3.4*  --  3.9 3.6*     --  102 96*   CO2 24  --  22 23   GLUCOSE 112*  --  149* 153*   BUN 23  --  20 25*   CREATININE 0.92*  --  0.94* 0.82   MG 1.6  --   --   --    ANIONGAP 9  --  10 12   LABGLOM >60  --  >60 >60   CALCIUM 8.4*  --  8.8 8.8   PROBNP  --  1,357*  --   --        Recent Labs     02/07/23  0736 02/07/23  1132 02/07/23  1655 02/07/23 2008 02/08/23  0753 02/08/23  1225   POCGLU 148* 269* 259* 142* 132* 202*         Lab Results   Component Value Date/Time    SPECIAL NOT REPORTED 08/29/2012 06:42 PM     Lab Results   Component Value Date/Time    CULTURE (A) 08/29/2012 06:41 PM     NO SALMONELLA, SHIGELLA, YERSINIA OR CAMPYLOBACTER ISOLATED    CULTURE NO ESCHERICHIA COLI O157:H7 ISOLATED 08/29/2012 06:41 PM    CULTURE NEGATIVE FOR SHIGATOXIN (A) 08/29/2012 06:41 PM    CULTURE  08/29/2012 06:41 PM     Performed at [address] [place] [address] [phone]       No results found for: POCPH, PHART, PH, POCPCO2, OAX8QIZ, PCO2, POCPO2, PO2ART, PO2, POCHCO3, GMJ6KOQ, HCO3, NBEA, PBEA, BEART, BE, THGBART, THB, BGT1MWZ, ZCFV0OIE, E6BXXLQW, O2SAT, FIO2    Radiology:    XR RADIUS ULNA LEFT (2 VIEWS)    Result Date: 2/2/2023  No acute osseous abnormality. XR CHEST PORTABLE    Result Date: 2/4/2023  Findings favoring moderate CHF pattern pulmonary vascular congestion. Difficult to exclude superimposed infection. Attention on follow-up.      XR CHEST PORTABLE    Result Date: 2/2/2023  No acute process. All radiological studies reviewed  Code Status:  Full Code        Electronically signed by Josafat Gray MD on 2/8/2023 at 4:35 PM    This note was created with the assistance of a speech-recognition program.  Although the intention is to generate a document that actually reflects the content of the visit, no guarantees can be provided that every mistake has been identified and corrected by editing. Note was updated later by me after  physical examination and  completion of the assessment.

## 2023-02-08 NOTE — CONSULTS
[facility]  Initial Consult Note  Date: 2/8/2023    Hospital day :4     Impression:   Concern for urinary tract infection  Frequent falls  New onset congestive heart failure  Paroxysmal SVT  Developmental disability  Diabetes mellitus  Hypertension    Recommendations   The patient can continue on ceftriaxone  I have ordered a urine culture  We will continue to follow culture data and adjust therapy accordingly    Chief complaint/reason for consultation:   Urinary tract infection    History of Present Illness:   Lenard Curry is a 60y.o.-year-old female who was initially admitted on 2/4/2023. The patient has known medical history of diabetes mellitus, hypertension, CVA, MRDD. She is not a great historian, most information was obtained from the patient's chart and the nursing staff. She reportedly lives in a \"group home\" which upon further questioning is actually a home where she and her brother reside and family have caregivers coming in to take care of her. Just prior to admission, she had fallen a Few times, the patient tells me she tripped over the rug. The sister also states that she had been confused for a couple of days and usually when this happens she has a urinary tract infection. She did not have any fevers. Patient was admitted, diagnosed with new onset congestive heart failure, paroxysmal supraventricular tachycardia. Yesterday she was initiated on cefepime due to concern for possible urinary tract infection. Urinalysis with too numerous WBCs to count, positive for nitrates, moderate leuk esterase, many bacteria. We were consulted due to concern for urinary tract infection. I have personally reviewed the past medical history, past surgical history, medications, social history, and family history, and I have updated the database accordingly.   Past Medical History:     Past Medical History:   Diagnosis Date    Cerebral artery occlusion with cerebral infarction ([facility] [address] Diabetes mellitus ([facility] [address]     HTN (hypertension)     Mental disability      Past Surgical  History:   History reviewed. No pertinent surgical history. Medications:      cefepime  2,000 mg IntraVENous Q12H    apixaban  5 mg Oral BID    atorvastatin  20 mg Oral Nightly    metoprolol succinate  50 mg Oral Daily    QUEtiapine  25 mg Oral Nightly    sertraline  25 mg Oral Daily    spironolactone  25 mg Oral Daily    fenofibrate  54 mg Oral Daily    glipiZIDE  2.5 mg Oral QAM AC    omega-3 acid ethyl esters  1 g Oral BID    insulin lispro  0-8 Units SubCUTAneous TID WC    insulin lispro  0-4 Units SubCUTAneous Nightly    adenosine  6 mg IntraVENous Once    sodium chloride flush  5-40 mL IntraVENous 2 times per day    furosemide  40 mg IntraVENous Daily     Social History:     Social History     Socioeconomic History    Marital status: Single     Spouse name: Not on file    Number of children: Not on file    Years of education: Not on file    Highest education level: Not on file   Occupational History    Not on file   Tobacco Use    Smoking status: Never    Smokeless tobacco: Never   Substance and Sexual Activity    Alcohol use: Not Currently    Drug use: Not Currently    Sexual activity: Not on file   Other Topics Concern    Not on file   Social History Narrative    Not on file     Social Determinants of Health     Financial Resource Strain: Not on file   Food Insecurity: Not on file   Transportation Needs: Not on file   Physical Activity: Not on file   Stress: Not on file   Social Connections: Not on file   Intimate Partner Violence: Not on file   Housing Stability: Not on file     Family History:   History reviewed. No pertinent family history. Allergies:   Patient has no known allergies. Review of Systems:   General: No fevers or chills. Eyes: No double vision or blurry vision. ENT: No sore throat or runny nose. Cardiovascular: No chest pain or palpitations. Lung: No shortness of breath or cough.   Abdomen: No nausea, vomiting, diarrhea, or abdominal pain. Genitourinary: No increased urinary frequency, or dysuria. Musculoskeletal: No muscle aches or pains. Hematologic: No bleeding or bruising. Neurologic:  Headache    Physical Examination :   /75   Pulse 90   Temp 97 °F (36.1 °C) (Temporal)   Resp 18   Ht 5' 11\" (1.803 m)   Wt 258 lb 8 oz (117.3 kg)   SpO2 94%   BMI 36.05 kg/m²     Temperature Range: Temp: 97 °F (36.1 °C) Temp  Av.4 °F (36.3 °C)  Min: 97 °F (36.1 °C)  Max: 97.8 °F (36.6 °C)  General Appearance: Awake, alert, and in no apparent distress  Head: Normocephalic, without obvious abnormality, atraumatic  Eyes: Pupils equal, round, reactive, to light and accommodation; extraocular movements intact; sclera anicteric; conjunctivae pink  ENT: Oropharynx clear, without erythema, exudate, or thrush. Neck: Supple, without lymphadenopathy. Pulmonary/Chest: Clear to auscultation, without wheezes, rales, or rhonchi  Cardiovascular: Regular rate and rhythm without murmurs, rubs, or gallops. Abdomen: Soft, nontender, nondistended. Extremities: No cyanosis, clubbing, edema, or effusions. Neurologic: No gross sensory or motor deficits. Skin: Warm and dry with no rash. Medical Decision Making:   I have independently reviewed/ordered the following labs:  CBC with Differential:   Recent Labs     23  0523 23  0335   WBC 6.0 8.2   HGB 11.7* 12.0   HCT 37.1 38.3   * 145   LYMPHOPCT 26 19*   MONOPCT 11 10     BMP:   Recent Labs     23  0315 23  0523 23  0335   * 134* 131*   K 3.4* 3.9 3.6*    102 96*   CO2 24 22 23   BUN 23 20 25*   CREATININE 0.92* 0.94* 0.82   MG 1.6  --   --      Hepatic Function Panel: No results for input(s): PROT, LABALBU, BILIDIR, IBILI, BILITOT, ALKPHOS, ALT, AST in the last 72 hours.     No results found for: PROCAL    No results found for: CRP  No results found for: FERRITIN  No results found for: FIBRINOGEN  No results found for: DDIMER  No results found for: LDH    No results found for: SEDRATE    No results found for: COVID19  No results found for requested labs within last 30 days. Latest Reference Range & Units 2/7/23 18:24   Color, UA Yellow  Yellow   Turbidity UA Clear  Cloudy ! Glucose, UA NEGATIVE  NEGATIVE   Bilirubin, Urine NEGATIVE  NEGATIVE   Ketones, Urine NEGATIVE  NEGATIVE   Specific Gravity, UA 1.005 - 1.030  1.025   pH, UA 5.0 - 8.0  6.0   Protein, UA NEGATIVE  3+ ! Urobilinogen, Urine Normal  Normal   Nitrite, Urine NEGATIVE  POSITIVE ! Leukocyte Esterase, Urine NEGATIVE  MOD ! WBC, UA 0 - 5 /HPF TOO NUMEROUS TO COUNT   RBC, UA 0 - 2 /HPF 2 TO 5   Epithelial Cells, UA 0 - 5 /HPF 2 TO 5   Bacteria, UA None  MANY ! Urine Hgb NEGATIVE  TRACE !   !: Data is abnormal  Imaging Studies:     XR RADIUS ULNA LEFT (2 VIEWS)  Result Date: 2/2/2023  No acute osseous abnormality. XR CHEST PORTABLE  Result Date: 2/4/2023  Findings favoring moderate CHF pattern pulmonary vascular congestion. Difficult to exclude superimposed infection. Attention on follow-up. XR CHEST PORTABLE  Result Date: 2/2/2023  No acute process. Cultures:   No culture data    Electronically signed by VANESSA Barroso CNP on 2/8/2023 at 8:32 AM      [facility]  [place], [address] messaging  OFFICE: [phone]    Thank you for allowing us to participate in the care of this patient. Please call with questions. This note is created with the assistance of a speech recognition program.  While intending to generate a document that actually reflects the content of the visit, the document can still have some errors including those of syntax and sound a like substitutions which may escape proof reading. In such instances, actual meaning can be extrapolated by contextual diversion.

## 2023-02-08 NOTE — SIGNIFICANT EVENT
No further work-up planned from a cardiac standpoint. Okay to discharge. Our office will arrange follow-up. We will continue present medications. Cardiology will sign off. Please reconsult should problems arise.

## 2023-02-08 NOTE — PROGRESS NOTES
Physical Therapy  Facility/Department: [facility]  Rehabilitation Physical Therapy Treatment Note    NAME: Cynthia Burkett  : 1960 (60 y.o.)  MRN: [id number]  CODE STATUS: Full Code    Date of Service: 23       Restrictions:  Restrictions/Precautions: General Precautions; Fall Risk  Position Activity Restriction  Other position/activity restrictions: Up w/ assist, telemetry, 1800 mL fluid restriction, external urinary catheter, RUE IV     SUBJECTIVE  Subjective  Subjective: Patient agreeable for PT treatment with Max encouragment. OBJECTIVE  Cognition  Overall Cognitive Status: Exceptions  Arousal/Alertness: Delayed responses to stimuli  Following Commands: Follows one step commands with repetition; Follows one step commands with increased time  Attention Span: Attends with cues to redirect  Safety Judgement: Decreased awareness of need for assistance;Decreased awareness of need for safety  Problem Solving: Assistance required to generate solutions;Assistance required to implement solutions;Assistance required to identify errors made;Assistance required to correct errors made;Decreased awareness of errors  Insights: Decreased awareness of deficits  Initiation: Requires cues for some  Sequencing: Requires cues for some  Orientation  Overall Orientation Status: Within Functional Limits    Functional Mobility  Bed Mobility  Overall Assistance Level: Maximum Assistance; Requires x 2 Assistance  Bed Mobility Training: Yes  Overall Level of Assistance: Moderate assistance;Assist X2;Maximum assistance; Additional time (ModAx2 to transfer sup>sit, MaxAx2 to transfer from sit>sup; pt inconsistently followed commands throughout session.)  Interventions: Safety awareness training;Verbal cues; Tactile cues  Supine to Sit: Moderate assistance;Assist X2;Additional time (w/ HOB elevated)  Sit to Supine: Maximum assistance;Assist X2 (Assist provided for BLEs and to bring trunk to supine)  Scooting:  Moderate assistance;Assist X2 (ModAx2 to scoot hips fully out to EOB)     Balance  Sitting: Intact (SBA while seated at EOB and engaging in UB ADLs)  Standing: With support (Mod-MaxAx2 w/ RW)  Environmental Mobility  Ambulation  Surface: Level surface  Device: Rolling walker  Distance: 2 ft EOB  Activity: Within Room  Activity Comments: verbal cues for safe use of RW  Additional Factors: Verbal cues  Assistance Level: MAX assistance x 2  Gait Deviations: Slow glenys;Decreased step length bilateral;Decreased weight shift left; Unsteady gait; Path deviations    Overall Level of Assistance: Maximum assistance;Assist X2 (To complete STS transfer from EOB; pt declining transfer to chair this date despite education on benefits of OOB activity, d/t headache pain and fatigue.)  Interventions: Safety awareness training;Verbal cues; Visual cues; Tactile cues (MAX cues for RW safety/mgnt, hand placement, upright posture, and slow/controlled sit<>stands all to increase overall safety.)  Sit to Stand: Maximum assistance;Assist X2  Stand to Sit: Maximum assistance;Assist X2                ASSESSMENT/PROGRESS TOWARDS GOALS       Assessment    Pt tolerated session Fair and is progressing towards goals. Facilitated UB ADLs while seated at EOB, w/ pt able to complete simple grooming tasks w/ Ham and UB dressing and bathing tasks w/ MaxA. Pt continues to display Poor safety awareness w/ RW, requiring the assist of 2 skilled staff for safe transfers and functional mobility. Recommended continued use of laine pang w/ nursing staff to maintain safety during [address] [place] [address] activities. Continued skilled PT services are indicated at this time to maximize this pt's safety and to faciliate safe return to OF as able. Tolerance: Patient limited by endurance; Patient limited by fatigue;Patient limited by pain  Activity Tolerance: Patient limited by endurance; Patient limited by fatigue;Patient limited by pain  Discharge Recommendations: Patient would benefit from continued therapy after discharge    Goals  Patient Goals   Patient Goals : Per family, feel better  Short Term Goals  Time Frame for Short Term Goals: 12 treatments  Short Term Goal 1: Independent bed mobility/transfers  Short Term Goal 2: SBA ambulation w/ ' x 1  Short Term Goal 3: Good balance/posture  Short Term Goal 4: 5/5 B LE's  Short Term Goal 5: Tolerate 30 min ther act    [address] Therapy Plan  General Plan: 5-7 times per week  Current Treatment Recommendations: Strengthening;Balance training;Functional mobility training;Transfer training;Gait training; Endurance training  Safety Devices  Type of Devices: Gait belt;Call light within reach;Nurse notified; Patient at risk for falls; All fall risk precautions in place; Bed alarm in place; Left in bed    EDUCATION  Education  Education Given To: Patient  Education Provided: Role of Therapy; ADL Function;Plan of Care;IADL Function;Home Exercise Program;Mobility Training;Precautions;Transfer Training; Safety; Energy Conservation  Education Method: Verbal  Barriers to Learning: Cognition  Education Outcome: Continued education needed    AM-PAC Score    AM-PAC Inpatient Mobility Raw Score : 9  AM-[facility] Inpatient T-Scale Score : 30.55  Mobility Inpatient CMS 0-100% Score: 81.38  Mobility Inpatient CMS G-Code Modifier:CL        Therapy Time   cotx Concurrent Group Co-treatment   Time In 1324         Time Out 1349         Minutes 25                 Co-treatment with OT warranted secondary to decreased safety and independence requiring 2 skilled therapy professionals to address individual discipline's goals. PT addressing preparation for  Transfers, gait and pre gait activities,  sitting balance for increased  sitting/activity tolerance, functional mobility, environmental safety/scanning, fall prevention, functional mobility  transfers and functional LE strength. Upon writer exit, call light within reach, pt retired to bed.  All lines intact and patient positioned comfortably. All patient needs addressed prior to ending therapy session. Chart reviewed prior to treatment and patient is agreeable for therapy. RN reports patient is medically stable for therapy treatment this date.        STAR BANKS PTA, 02/08/23 at 4:54 PM

## 2023-02-08 NOTE — PROGRESS NOTES
Occupational Therapy  Facility/Department: [facility] CVICU  Daily Treatment Note  NAME: Omero Hudson  : 1960  MRN: [id number]    Date of Service: 2023    RN Lorraine Olmos reports patient is medically stable for therapy treatment this date. Chart reviewed prior to treatment and patient is agreeable for therapy. All lines intact and patient positioned comfortably at end of treatment. All patient needs addressed prior to ending therapy session. Pt currently functioning below baseline. Recommend daily inpatient skilled therapy at time of discharge to maximize long term outcomes and prevent re-admission. Please refer to AM-PAC score for current level of function. Discharge Recommendations:  Patient would benefit from continued therapy after discharge  OT Equipment Recommendations  Equipment Needed:  (CTA)      Patient Diagnosis(es): The primary encounter diagnosis was Paroxysmal supraventricular tachycardia ([facility] [address] A diagnosis of Acute congestive heart failure, unspecified heart failure type [facility]) was also pertinent to this visit. Assessment    Assessment: Pt tolerated session Fair and is progressing towards goals. Facilitated UB ADLs while seated at EOB, w/ pt able to complete simple grooming tasks w/ Ham and UB dressing and bathing tasks w/ MaxA. Pt continues to display Poor safety awareness w/ RW, requiring the assist of 2 skilled staff for safe ADL transfers and functional mobility. Recommended continued use of laine pang w/ nursing staff to maintain safety during [address] [place] [address] activities. Continued skilled OT services are indicated at this time to maximize this pt's safety and IND with self care and to faciliate safe return to OF as able. Activity Tolerance: Patient limited by endurance; Patient limited by fatigue;Patient limited by pain  Discharge Recommendations: Patient would benefit from continued therapy after discharge  Equipment Needed:  (CTA)      Plan   Occupational Therapy Plan  Times Per Week: 4-5x/week 1-2x/day as tolerated  Current Treatment Recommendations: Strengthening;Balance training;Functional mobility training; Endurance training; Safety education & training;Neuromuscular re-education;Patient/Caregiver education & training;Self-Care / ADL;Cognitive/Perceptual training;Equipment evaluation, education, & procurement; Coordination training     Restrictions  Restrictions/Precautions  Restrictions/Precautions: General Precautions, Fall Risk  Required Braces or Orthoses?: No  Position Activity Restriction  Other position/activity restrictions: Up w/ assist, telemetry, 1800 mL fluid restriction, external urinary catheter, RUE IV     Subjective   Subjective  Subjective: Pt supine in bed upon entry to room, agreeable to sitting at EOB this date and getting washed up. Pt c/o pain associated w/ headache, rated 10/10 - RN notifed and present in room at session end. Cognition  Overall Cognitive Status: Exceptions  Arousal/Alertness: Delayed responses to stimuli  Following Commands: Follows one step commands with repetition; Follows one step commands with increased time  Attention Span: Attends with cues to redirect  Safety Judgement: Decreased awareness of need for assistance;Decreased awareness of need for safety  Problem Solving: Assistance required to generate solutions;Assistance required to implement solutions;Assistance required to identify errors made;Assistance required to correct errors made;Decreased awareness of errors  Insights: Decreased awareness of deficits  Initiation: Requires cues for some  Sequencing: Requires cues for some        Objective         Bed Mobility Training  Bed Mobility Training: Yes  Overall Level of Assistance: Moderate assistance;Assist X2;Maximum assistance; Additional time (ModAx2 to transfer sup>sit, MaxAx2 to transfer from sit>sup; pt inconsistently followed commands throughout session.)  Interventions: Safety awareness training;Verbal cues; Tactile cues  Supine to Sit: Moderate assistance;Assist X2;Additional time (w/ HOB elevated)  Sit to Supine: Maximum assistance;Assist X2 (Assist provided for BLEs and to bring trunk to supine)  Scooting: Moderate assistance;Assist X2 (ModAx2 to scoot hips fully out to EOB)    Balance  Sitting: Intact (SBA while seated at EOB and engaging in UB ADLs)  Standing: With support (Mod-MaxAx2 w/ RW)    Transfer Training  Transfer Training: Yes  Overall Level of Assistance: Maximum assistance;Assist X2 (To complete STS transfer from EOB; pt declining transfer to chair this date despite education on benefits of OOB activity, d/t headache pain and fatigue.)  Interventions: Safety awareness training;Verbal cues; Visual cues; Tactile cues (MAX cues for RW safety/mgnt, hand placement, upright posture, and slow/controlled sit<>stands all to increase overall safety.)  Sit to Stand: Maximum assistance;Assist X2  Stand to Sit: Maximum assistance;Assist X2    Functional Mobility  Overall Level of Assistance: Maximum assistance;Assist X2 (Pt w/ Max difficulty taking lateral steps to [address] this date; Poor safety awareness w/ RW. Only able to take steps in place d/t difficulty w/ sequencing RW and taking steps. MaxAx2 provided.)  Interventions: Safety awareness training;Verbal cues; Tactile cues; Visual cues  Assistive Device: Gait belt;Walker, rolling    Neuromuscular Education  NDT Treatment: Gait ;Standing;Sitting    ADL  Grooming: Minimal assistance;Setup  Grooming Skilled Clinical Factors: Ham to sit at EOB and engage in oral care tasks; pt required set up of toothpaste on brush and cues for thoroughness  UE Bathing:  Moderate assistance  UE Bathing Skilled Clinical Factors: ModA to engage in UB bathing tasks while seated at EOB; pt able to wash chest and required assist for back and armpits  UE Dressing: Maximum assistance  UE Dressing Skilled Clinical Factors: MaxA to doff soiled gown and to don clean gown while seated at EOB  Toileting: Dependent/Total (Holli Sue catheter in place)  Additional Comments: Pt's participation in ADLs continues to be limited d/t generalized weakness, Poor safety awareness in function, decreased static/dynamic balance, and decreased functional activity tolerance/fatigue. Safety Devices  Type of Devices: Gait belt;Call light within reach;Nurse notified; Patient at risk for falls; All fall risk precautions in place; Bed alarm in place; Left in bed     Patient Education  Education Given To: Patient  Education Provided: Role of Therapy;Transfer Training;Equipment;Plan of Care; Fall Prevention Strategies; ADL Adaptive Strategies;Precautions  Education Method: Demonstration;Verbal  Barriers to Learning: Cognition  Education Outcome: Continued education needed    AM-PAC: 12    Goals  Short Term Goals  Time Frame for Short Term Goals: By discharge, pt to demo:  Short Term Goal 1: bed mobility tasks w/ SBA and Good safety with use of bedrails/bed controls as needed. Short Term Goal 2: ADL transfers and functional mobility tasks w/ CGA and Good safety with use of AD as needed. Short Term Goal 3: increased BUE strength by 1/2 grade to promote functional strength and ROM required for safety and IND with self care tasks. Short Term Goal 4: UB ADLs to MinAx1 and LB ADLs to ModAx1 with Good safety and use of AE/AD as needed. Short Term Goal 5: toileting tasks to ModAx1 and Good safety with use of AD/grab bars/BSC as needed. Long Term Goals  Long Term Goal 1: Pt to demo increased standing tolerance to > 10 mins to promote functional activity tolerance and balance required to reduce the risk of falls in function. Long Term Goal 2: Caregivers to be IND with fall prevention strategies, EC/WS tech, compensatory ADL strategies, discharge/equipment recommendations, and a BUE HEP with use of handouts as needed. Patient Goals   Patient goals : To go home!        Therapy Time   Individual Concurrent Group Co-treatment   Time In 1324         Time Out 1033 Minutes 25         Co-treatment with PT warranted secondary to decreased safety and independence requiring 2 skilled therapy professionals to address individual discipline's goals. OT addressing preparation for ADL transfer, sitting balance for increased ADL performance, sitting/activity tolerance, functional reaching, fall prevention, functional mobility for ADL transfers, ability to sequence and follow directions, bed mobility tech, and functional UE strength.         Van Tyree, OT

## 2023-02-09 PROBLEM — G44.201 ACUTE INTRACTABLE TENSION-TYPE HEADACHE: Status: ACTIVE | Noted: 2023-02-09

## 2023-02-09 PROBLEM — G44.309 POST-CONCUSSION HEADACHE: Status: ACTIVE | Noted: 2023-02-09

## 2023-02-09 PROBLEM — I47.1 PAROXYSMAL SUPRAVENTRICULAR TACHYCARDIA (HCC): Status: ACTIVE | Noted: 2023-02-09

## 2023-02-09 PROBLEM — N39.0 URINARY TRACT INFECTION WITHOUT HEMATURIA: Status: ACTIVE | Noted: 2023-02-09

## 2023-02-09 LAB
ABSOLUTE EOS #: 0.12 K/UL (ref 0–0.44)
ABSOLUTE IMMATURE GRANULOCYTE: 0.06 K/UL (ref 0–0.3)
ABSOLUTE LYMPH #: 2.1 K/UL (ref 1.1–3.7)
ABSOLUTE MONO #: 0.69 K/UL (ref 0.1–1.2)
ANION GAP SERPL CALCULATED.3IONS-SCNC: 10 MMOL/L (ref 9–17)
BASOPHILS # BLD: 0 % (ref 0–2)
BASOPHILS ABSOLUTE: 0.03 K/UL (ref 0–0.2)
BUN SERPL-MCNC: 24 MG/DL (ref 8–23)
BUN/CREAT BLD: 26 (ref 9–20)
CALCIUM SERPL-MCNC: 8.9 MG/DL (ref 8.6–10.4)
CHLORIDE SERPL-SCNC: 96 MMOL/L (ref 98–107)
CO2 SERPL-SCNC: 26 MMOL/L (ref 20–31)
CREAT SERPL-MCNC: 0.91 MG/DL (ref 0.5–0.9)
EOSINOPHILS RELATIVE PERCENT: 1 % (ref 1–4)
GFR SERPL CREATININE-BSD FRML MDRD: >60 ML/MIN/1.73M2
GLUCOSE BLD-MCNC: 152 MG/DL (ref 65–105)
GLUCOSE BLD-MCNC: 337 MG/DL (ref 65–105)
GLUCOSE BLD-MCNC: 344 MG/DL (ref 65–105)
GLUCOSE BLD-MCNC: 370 MG/DL (ref 65–105)
GLUCOSE BLD-MCNC: 422 MG/DL (ref 65–105)
GLUCOSE SERPL-MCNC: 155 MG/DL (ref 70–99)
HCT VFR BLD AUTO: 38.4 % (ref 36.3–47.1)
HGB BLD-MCNC: 12 G/DL (ref 11.9–15.1)
IMMATURE GRANULOCYTES: 1 %
LYMPHOCYTES # BLD: 24 % (ref 24–43)
MAGNESIUM SERPL-MCNC: 1.7 MG/DL (ref 1.6–2.6)
MCH RBC QN AUTO: 27.1 PG (ref 25.2–33.5)
MCHC RBC AUTO-ENTMCNC: 31.3 G/DL (ref 28.4–34.8)
MCV RBC AUTO: 86.9 FL (ref 82.6–102.9)
MONOCYTES # BLD: 8 % (ref 3–12)
NRBC AUTOMATED: 0 PER 100 WBC
PDW BLD-RTO: 14.4 % (ref 11.8–14.4)
PLATELET # BLD AUTO: 154 K/UL (ref 138–453)
PMV BLD AUTO: 10.7 FL (ref 8.1–13.5)
POTASSIUM SERPL-SCNC: 3.4 MMOL/L (ref 3.7–5.3)
RBC # BLD: 4.42 M/UL (ref 3.95–5.11)
SEG NEUTROPHILS: 66 % (ref 36–65)
SEGMENTED NEUTROPHILS ABSOLUTE COUNT: 5.93 K/UL (ref 1.5–8.1)
SODIUM SERPL-SCNC: 132 MMOL/L (ref 135–144)
WBC # BLD AUTO: 8.9 K/UL (ref 3.5–11.3)

## 2023-02-09 PROCEDURE — 6360000002 HC RX W HCPCS: Performed by: NURSE PRACTITIONER

## 2023-02-09 PROCEDURE — 6370000000 HC RX 637 (ALT 250 FOR IP): Performed by: NURSE PRACTITIONER

## 2023-02-09 PROCEDURE — 80048 BASIC METABOLIC PNL TOTAL CA: CPT

## 2023-02-09 PROCEDURE — 83735 ASSAY OF MAGNESIUM: CPT

## 2023-02-09 PROCEDURE — 99231 SBSQ HOSP IP/OBS SF/LOW 25: CPT | Performed by: INTERNAL MEDICINE

## 2023-02-09 PROCEDURE — 85025 COMPLETE CBC W/AUTO DIFF WBC: CPT

## 2023-02-09 PROCEDURE — 2060000000 HC ICU INTERMEDIATE R&B

## 2023-02-09 PROCEDURE — 6370000000 HC RX 637 (ALT 250 FOR IP): Performed by: HOSPITALIST

## 2023-02-09 PROCEDURE — 82947 ASSAY GLUCOSE BLOOD QUANT: CPT

## 2023-02-09 PROCEDURE — 99223 1ST HOSP IP/OBS HIGH 75: CPT | Performed by: PSYCHIATRY & NEUROLOGY

## 2023-02-09 PROCEDURE — 2580000003 HC RX 258: Performed by: NURSE PRACTITIONER

## 2023-02-09 PROCEDURE — 6370000000 HC RX 637 (ALT 250 FOR IP): Performed by: FAMILY MEDICINE

## 2023-02-09 PROCEDURE — 36415 COLL VENOUS BLD VENIPUNCTURE: CPT

## 2023-02-09 RX ORDER — DEXAMETHASONE SODIUM PHOSPHATE 10 MG/ML
8 INJECTION, SOLUTION INTRAMUSCULAR; INTRAVENOUS ONCE
Status: COMPLETED | OUTPATIENT
Start: 2023-02-09 | End: 2023-02-09

## 2023-02-09 RX ORDER — INSULIN LISPRO 100 [IU]/ML
0-4 INJECTION, SOLUTION INTRAVENOUS; SUBCUTANEOUS NIGHTLY
Status: DISCONTINUED | OUTPATIENT
Start: 2023-02-09 | End: 2023-02-10 | Stop reason: HOSPADM

## 2023-02-09 RX ORDER — KETOROLAC TROMETHAMINE 30 MG/ML
30 INJECTION, SOLUTION INTRAMUSCULAR; INTRAVENOUS ONCE
Status: COMPLETED | OUTPATIENT
Start: 2023-02-09 | End: 2023-02-09

## 2023-02-09 RX ORDER — PROCHLORPERAZINE EDISYLATE 5 MG/ML
10 INJECTION INTRAMUSCULAR; INTRAVENOUS ONCE
Status: COMPLETED | OUTPATIENT
Start: 2023-02-09 | End: 2023-02-09

## 2023-02-09 RX ORDER — DIPHENHYDRAMINE HYDROCHLORIDE 50 MG/ML
25 INJECTION INTRAMUSCULAR; INTRAVENOUS ONCE
Status: COMPLETED | OUTPATIENT
Start: 2023-02-09 | End: 2023-02-09

## 2023-02-09 RX ORDER — INSULIN LISPRO 100 [IU]/ML
0-16 INJECTION, SOLUTION INTRAVENOUS; SUBCUTANEOUS
Status: DISCONTINUED | OUTPATIENT
Start: 2023-02-09 | End: 2023-02-10 | Stop reason: HOSPADM

## 2023-02-09 RX ORDER — FUROSEMIDE 40 MG/1
40 TABLET ORAL DAILY
Qty: 60 TABLET | Refills: 0 | Status: SHIPPED | OUTPATIENT
Start: 2023-02-10

## 2023-02-09 RX ORDER — CIPROFLOXACIN 500 MG/1
500 TABLET, FILM COATED ORAL 2 TIMES DAILY
Qty: 14 TABLET | Refills: 0 | Status: SHIPPED | OUTPATIENT
Start: 2023-02-09 | End: 2023-02-15

## 2023-02-09 RX ADMIN — SODIUM CHLORIDE, PRESERVATIVE FREE 10 ML: 5 INJECTION INTRAVENOUS at 22:28

## 2023-02-09 RX ADMIN — SODIUM CHLORIDE, PRESERVATIVE FREE 10 ML: 5 INJECTION INTRAVENOUS at 06:31

## 2023-02-09 RX ADMIN — APIXABAN 5 MG: 5 TABLET, FILM COATED ORAL at 09:11

## 2023-02-09 RX ADMIN — APIXABAN 5 MG: 5 TABLET, FILM COATED ORAL at 22:28

## 2023-02-09 RX ADMIN — PROCHLORPERAZINE EDISYLATE 10 MG: 5 INJECTION INTRAMUSCULAR; INTRAVENOUS at 06:34

## 2023-02-09 RX ADMIN — POTASSIUM CHLORIDE 40 MEQ: 1500 TABLET, EXTENDED RELEASE ORAL at 09:10

## 2023-02-09 RX ADMIN — ATORVASTATIN CALCIUM 20 MG: 20 TABLET, FILM COATED ORAL at 22:28

## 2023-02-09 RX ADMIN — FENOFIBRATE 54 MG: 54 TABLET ORAL at 09:17

## 2023-02-09 RX ADMIN — FUROSEMIDE 40 MG: 40 TABLET ORAL at 09:10

## 2023-02-09 RX ADMIN — OMEGA-3-ACID ETHYL ESTERS 1 G: 1 CAPSULE, LIQUID FILLED ORAL at 09:10

## 2023-02-09 RX ADMIN — DIPHENHYDRAMINE HYDROCHLORIDE 25 MG: 50 INJECTION, SOLUTION INTRAMUSCULAR; INTRAVENOUS at 06:35

## 2023-02-09 RX ADMIN — SPIRONOLACTONE 25 MG: 25 TABLET ORAL at 09:10

## 2023-02-09 RX ADMIN — ACETAMINOPHEN 650 MG: 325 TABLET ORAL at 17:24

## 2023-02-09 RX ADMIN — INSULIN LISPRO 16 UNITS: 100 INJECTION, SOLUTION INTRAVENOUS; SUBCUTANEOUS at 17:17

## 2023-02-09 RX ADMIN — DEXAMETHASONE SODIUM PHOSPHATE 8 MG: 10 INJECTION, SOLUTION INTRAMUSCULAR; INTRAVENOUS at 06:31

## 2023-02-09 RX ADMIN — INSULIN LISPRO 6 UNITS: 100 INJECTION, SOLUTION INTRAVENOUS; SUBCUTANEOUS at 12:23

## 2023-02-09 RX ADMIN — TRAMADOL HYDROCHLORIDE 50 MG: 50 TABLET, COATED ORAL at 09:10

## 2023-02-09 RX ADMIN — KETOROLAC TROMETHAMINE 30 MG: 30 INJECTION, SOLUTION INTRAMUSCULAR; INTRAVENOUS at 06:37

## 2023-02-09 RX ADMIN — CEFTRIAXONE SODIUM 1000 MG: 1 INJECTION, POWDER, FOR SOLUTION INTRAMUSCULAR; INTRAVENOUS at 11:59

## 2023-02-09 RX ADMIN — OMEGA-3-ACID ETHYL ESTERS 1 G: 1 CAPSULE, LIQUID FILLED ORAL at 22:28

## 2023-02-09 RX ADMIN — SODIUM CHLORIDE, PRESERVATIVE FREE 10 ML: 5 INJECTION INTRAVENOUS at 09:22

## 2023-02-09 RX ADMIN — QUETIAPINE FUMARATE 25 MG: 25 TABLET ORAL at 22:28

## 2023-02-09 RX ADMIN — TRAMADOL HYDROCHLORIDE 50 MG: 50 TABLET, COATED ORAL at 03:16

## 2023-02-09 RX ADMIN — SERTRALINE 25 MG: 50 TABLET, FILM COATED ORAL at 09:10

## 2023-02-09 RX ADMIN — GLIPIZIDE 2.5 MG: 5 TABLET ORAL at 09:11

## 2023-02-09 RX ADMIN — INSULIN LISPRO 4 UNITS: 100 INJECTION, SOLUTION INTRAVENOUS; SUBCUTANEOUS at 22:16

## 2023-02-09 ASSESSMENT — PAIN DESCRIPTION - DESCRIPTORS
DESCRIPTORS: ACHING

## 2023-02-09 ASSESSMENT — PAIN SCALES - GENERAL
PAINLEVEL_OUTOF10: 0
PAINLEVEL_OUTOF10: 0
PAINLEVEL_OUTOF10: 10
PAINLEVEL_OUTOF10: 9
PAINLEVEL_OUTOF10: 3

## 2023-02-09 ASSESSMENT — PAIN - FUNCTIONAL ASSESSMENT
PAIN_FUNCTIONAL_ASSESSMENT: ACTIVITIES ARE NOT PREVENTED
PAIN_FUNCTIONAL_ASSESSMENT: ACTIVITIES ARE NOT PREVENTED

## 2023-02-09 ASSESSMENT — PAIN DESCRIPTION - LOCATION
LOCATION: HEAD

## 2023-02-09 ASSESSMENT — ENCOUNTER SYMPTOMS
RESPIRATORY NEGATIVE: 1
GASTROINTESTINAL NEGATIVE: 1

## 2023-02-09 ASSESSMENT — PAIN DESCRIPTION - ORIENTATION: ORIENTATION: LEFT;ANTERIOR

## 2023-02-09 NOTE — DISCHARGE INSTR - COC
Continuity of Care Form    Patient Name: Tammie Montano   :  1960  MRN:  [id number]    Admit date:  2023  Discharge date:  ***    Code Status Order: Full Code   Advance Directives:     Admitting Physician:  Mone Cazares MD  PCP: Jeremiah Johnston MD    Discharging Nurse: [facility] Unit/Room#: 2035/2035-01  Discharging Unit Phone Number: ***    Emergency Contact:   Extended Emergency Contact Information  Primary Emergency Contact: [facility] Phone: [phone]  Mobile Phone: [phone]  Relation: Brother/Sister    Past Surgical History:  History reviewed. No pertinent surgical history. Immunization History:   Immunization History   Administered Date(s) Administered    COVID-19, PFIZER PURPLE top, DILUTE for use, (age 15 y+), 30mcg/0.3mL 2021, 2021, 2022       Active Problems:  Patient Active Problem List   Diagnosis Code    New onset of congestive heart failure (HCC) I50.9    Paroxysmal supraventricular tachycardia (HCC) I47.1    Acute intractable tension-type headache G44.201    Post-concussion headache G44.309       Isolation/Infection:   Isolation            No Isolation          Patient Infection Status       None to display            Nurse Assessment:  Last Vital Signs: /64   Pulse 83   Temp 97.1 °F (36.2 °C) (Temporal)   Resp 14   Ht 5' 11\" (1.803 m)   Wt 262 lb (118.8 kg)   SpO2 91%   BMI 36.54 kg/m²     Last documented pain score (0-10 scale): Pain Level: 0  Last Weight:   Wt Readings from Last 1 Encounters:   23 262 lb (118.8 kg)     Mental Status:  disoriented and alert    IV Access:  - None    Nursing Mobility/ADLs:  Walking   Assisted  Transfer  Assisted  Bathing  Assisted  Dressing  Assisted  Toileting  Assisted  Feeding  Assisted  Med Admin  Assisted  Med Delivery   whole    Wound Care Documentation and Therapy:        Elimination:  Continence:    Bowel: No  Bladder: Yes  Urinary Catheter: None   Colostomy/Ileostomy/Ileal Conduit: No       Date of Last BM: 2/10/2023    Intake/Output Summary (Last 24 hours) at 2/9/2023 1628  Last data filed at 2/9/2023 0956  Gross per 24 hour   Intake 840 ml   Output 500 ml   Net 340 ml     I/O last 3 completed shifts: In: 1140 [P.O.:1140]  Out: [address] [Urine:1350]    Safety Concerns: At Risk for Falls    Impairments/Disabilities:      None, history of mental disability    Nutrition Therapy:  Current Nutrition Therapy:   - Oral Diet:  Carb Control 4 carbs/meal (1800kcals/day), Cardiac, and 1800 ml fluid restriction    Routes of Feeding: Oral  Liquids: No Restrictions  Daily Fluid Restriction: yes - amount 1800 ml  Last Modified Barium Swallow with Video (Video Swallowing Test): not done    Treatments at the Time of Hospital Discharge:   Respiratory Treatments: none  Oxygen Therapy:  is not on home oxygen therapy. Ventilator:    - No ventilator support    Rehab Therapies: Physical Therapy and Occupational Therapy  Weight Bearing Status/Restrictions: No weight bearing restrictions  Other Medical Equipment (for information only, NOT a DME order):  wheelchair and walker  Other Treatments:   Skilled nurse assessment  Medication teaching and compliance. Resume aide services from [facility].   Call sister Minoo Carrion to set up appt and sign paper work [phone]  Patient's personal belongings (please select all that are sent with patient):  None    RN SIGNATURE:  Electronically signed by Dwayne Leija RN on 2/10/23 at 5:34 PM EST    CASE MANAGEMENT/SOCIAL WORK SECTION    Inpatient Status Date: ***    Readmission Risk Assessment Score:  Readmission Risk              Risk of Unplanned Readmission:  19           Discharging to Facility/ Agency   Name: [facility]  Address:  [address] [place] [address] (if applicable)   Name:  Address:  Dialysis Schedule:  Phone:  Fax:    / signature: Electronically signed by Smiley Oreilly on 2/10/23 at 4:24 PM EST    PHYSICIAN SECTION    Prognosis: Fair    Condition at Discharge: Stable    Rehab Potential (if transferring to Rehab): Fair    Recommended Labs or Other Treatments After Discharge:     Physician Certification: I certify the above information and transfer of Sherwin Mcgregor  is necessary for the continuing treatment of the diagnosis listed and that she requires Home Care for less 30 days.      Update Admission H&P: No change in H&P    PHYSICIAN SIGNATURE:  Electronically signed by Mackey Spurling, MD on 2/9/23 at 4:28 PM EST

## 2023-02-09 NOTE — PROGRESS NOTES
Pt is continuing to c/o severe headache that was only temporarily relieved by the one time headache cocktail (decadron, compazine, toradol, and benadryl) given per night shift. Other than c/o pain and a little drowsiness, neuro assessment is unchanged from baseline.  Notified primary team per Linda Gonzalez NP's request.

## 2023-02-09 NOTE — PROGRESS NOTES
Patient c/o ongoing headaches. McGrath Millwood APRN notified. [facility] called and addressed plan. Orders received to update if patient continues to have headache prior to end of shift. Writer to follow order. Continue to monitor.

## 2023-02-09 NOTE — CONSULTS
Neurology Consult Note - Neurology Inpatient Service    Patient Name: Joy Jackson  Patient : 1960  MRN: [id number]     Acct: [de-identified]  Date of Admission: 2023  Room/Bed:   PCP: Kwan Jamison MD    2023    Reason for Consult:  Severe Headache. History of Presenting Illness: The patient is 60 y.o. female -- who is being seen as a new consult for reported severe headache. Symptom: Headache. Manner of onset: Apparently sudden. Description of event(s): Per patient is care provider and, nursing staff as well as the primary team, patient has been complaining of moderate to severe intensity, global headache that is aching in nature and, reportedly, is causing significant inconvenience to the patient. Per chart review, patient is being treated for UTI with antibiotics and, has remained hemodynamically stable with well-controlled blood pressure, pulse and respiration. The patient has baseline history of frequent falls. The patient also incurred a fall event prior to the admission which probably caused a concussion to the patient. Patient has baseline history of psychomotor retardation with developmental disability, type 2 diabetes mellitus and well-controlled hypertension. She also has a history of new onset CHF and, atrial fibrillation. Duration: 2 to 3 days. Current state: Improved, per patient. Severity: Unclear but, at least mild to moderate in severity. At this moment he does not appear to have impaired her ability to perform ADLs. But, reportedly, earlier yesterday and, a day before the headache was intrusive towards her ability to perform her routine ADLs. Modifying factors: Over-the-counter NSAIDs improve headaches. Rest improves headache. Patient is being treated for UTI which is probably the contributing factor towards her headache. Associated symptoms: No focal weakness in extremities or new onset pain or numbness in extremities reported. No visual difficulties reported. Neurology service was consulted. Thank you. Review of systems:    As noted above. Despite attempts, I could not obtain appropriate review of systems due to patient's baseline MRDD. Past medical History, surgical history, family history and social history were reviewed with the patient/care provider and were reviewed in the chart. Only the available information is documented below. Thank you. Past Medical History:        Diagnosis Date    Cerebral artery occlusion with cerebral infarction ([facility] [address]     Diabetes mellitus ([facility] [address]     HTN (hypertension)     Mental disability        Past Surgical History:    History reviewed. No pertinent surgical history. Family History:       History reviewed. No pertinent family history. Social History:      TOBACCO:   reports that she has never smoked. She has never used smokeless tobacco.  ETOH:   reports that she does not currently use alcohol. RECREATIONAL DRUG USE:   Social History     Substance and Sexual Activity   Drug Use Not Currently       The patient's medications and allergies were reviewed and the available information is documented below. Thank you. Allergies: Patient has no known allergies. Home Medications:   Prior to Admission medications    Medication Sig Start Date End Date Taking?  Authorizing Provider   methenamine (HIPREX) 1 g tablet Take 1 g by mouth 2 times daily (with meals)   Yes Historical Provider, MD   apixaban (ELIQUIS) 5 MG TABS tablet Take 5 mg by mouth 2 times daily 1/29/21   Historical Provider, MD   atorvastatin (LIPITOR) 20 MG tablet Take 20 mg by mouth nightly 4/17/21   Historical Provider, MD   cyanocobalamin 1000 MCG tablet Take 1,000 mcg by mouth daily 4/17/21   Historical Provider, MD   fenofibrate (TRICOR) 145 MG tablet 145 mg daily 1/26/23   Historical Provider, MD   glimepiride (AMARYL) 4 MG tablet 4 mg with breakfast and with evening meal 1/26/23   Historical Provider, MD hydrOXYzine HCl (ATARAX) 25 MG tablet Take 25 mg by mouth nightly as needed 2/4/21   Historical Provider, MD   Icosapent Ethyl (VASCEPA) 1 g CAPS capsule Take 2 g by mouth 2 times daily (with meals)  Patient not taking: No sig reported    Historical Provider, MD   metFORMIN (GLUCOPHAGE) 500 MG tablet 500 mg 2 times daily (with meals) 1/26/23   Historical Provider, MD   insulin aspart (NOVOLOG FLEXPEN) 100 UNIT/ML injection pen Inject into the skin 3 times daily (with meals) 12/3/19   Historical Provider, MD   metoprolol succinate (TOPROL XL) 50 MG extended release tablet 50 mg daily 1/26/23   Historical Provider, MD   MYRBETRIQ 25 MG TB24 25 mg daily 1/26/23   Historical Provider, MD   QUEtiapine (SEROQUEL) 25 MG tablet 25 mg every evening 1/26/23   Historical Provider, MD   sertraline (ZOLOFT) 25 MG tablet  1/26/23   Historical Provider, MD   spironolactone (ALDACTONE) 25 MG tablet 25 mg daily 1/26/23   Historical Provider, MD       Current Hospital Medications:    Current Facility-Administered Medications:     traMADol (ULTRAM) tablet 50 mg, 50 mg, Oral, Q6H PRN, Adeel Garcia MD, 50 mg at 02/09/23 0910    cefTRIAXone (ROCEPHIN) 1,000 mg in sodium chloride 0.9 % 50 mL IVPB (mini-bag), 1,000 mg, IntraVENous, Q24H, Ronna Thakkar APRN - CNP, Stopped at 02/08/23 1354    furosemide (LASIX) tablet 40 mg, 40 mg, Oral, Daily, Adeel Garcia MD, 40 mg at 02/09/23 0910    apixaban (ELIQUIS) tablet 5 mg, 5 mg, Oral, BID, Quentin Bustos MD, 5 mg at 02/09/23 0911    atorvastatin (LIPITOR) tablet 20 mg, 20 mg, Oral, Nightly, Quentin Bustos MD, 20 mg at 02/08/23 2033    hydrOXYzine HCl (ATARAX) tablet 25 mg, 25 mg, Oral, Nightly PRN, Quentin Bustos MD    metoprolol succinate (TOPROL XL) extended release tablet 50 mg, 50 mg, Oral, Daily, Quentin Bustos MD, 50 mg at 02/08/23 0854    QUEtiapine (SEROQUEL) tablet 25 mg, 25 mg, Oral, Nightly, Quentin Bustos MD, 25 mg at 02/08/23 2033    sertraline (ZOLOFT) tablet 25 mg, 25 mg, Oral, Daily, Quentin Bustos MD, 25 mg at 02/09/23 0910    spironolactone (ALDACTONE) tablet 25 mg, 25 mg, Oral, Daily, Quentin Bustos MD, 25 mg at 02/09/23 0910    fenofibrate (TRICOR) tablet 54 mg, 54 mg, Oral, Daily, Quentin Bustos MD, 54 mg at 02/09/23 0917    glipiZIDE (GLUCOTROL) tablet 2.5 mg, 2.5 mg, Oral, QAM AC, Quentin Bustos MD, 2.5 mg at 02/09/23 0911    omega-3 acid ethyl esters (LOVAZA) capsule 1 g, 1 g, Oral, BID, Quentin Bustos MD, 1 g at 02/09/23 0910    insulin lispro (HUMALOG) injection vial 0-8 Units, 0-8 Units, SubCUTAneous, TID WC, Quentin Bustos MD, 2 Units at 02/08/23 1349    insulin lispro (HUMALOG) injection vial 0-4 Units, 0-4 Units, SubCUTAneous, Nightly, Quentin Bustos MD    glucose chewable tablet 16 g, 4 tablet, Oral, PRN, Quentin Bustos MD    dextrose bolus 10% 125 mL, 125 mL, IntraVENous, PRN **OR** dextrose bolus 10% 250 mL, 250 mL, IntraVENous, PRN, Quentin Bustos MD    glucagon (rDNA) injection 1 mg, 1 mg, SubCUTAneous, PRN, Quentin Bustos MD    dextrose 10 % infusion, , IntraVENous, Continuous PRN, Quentin Bustos MD    adenosine (ADENOCARD) injection 6 mg, 6 mg, IntraVENous, Once, Bobby Gilliam MD    sodium chloride flush 0.9 % injection 5-40 mL, 5-40 mL, IntraVENous, 2 times per day, Mitra A Lump, APRN - CNP, 10 mL at 02/09/23 0922    sodium chloride flush 0.9 % injection 10 mL, 10 mL, IntraVENous, PRN, Mitra A Lump, APRN - CNP, 10 mL at 02/09/23 0631    0.9 % sodium chloride infusion, , IntraVENous, PRN, Gardenia Larson Lump, APRN - CNP, Last Rate: 15 mL/hr at 02/08/23 1656, New Bag at 02/08/23 1656    potassium chloride (KLOR-CON M) extended release tablet 40 mEq, 40 mEq, Oral, PRN, 40 mEq at 02/09/23 0910 **OR** potassium bicarb-citric acid (EFFER-K) effervescent tablet 40 mEq, 40 mEq, Oral, PRN **OR** potassium chloride 10 mEq/100 mL IVPB (Peripheral Line), 10 mEq, IntraVENous, PRN, Mitra EDDIE Lump, APRN - CNP    magnesium sulfate 1000 mg in dextrose 5% 100 mL IVPB, 1,000 mg, IntraVENous, PRN, Olga Slough Lump, APRN - CNP    ondansetron (ZOFRAN-ODT) disintegrating tablet 4 mg, 4 mg, Oral, Q8H PRN, 4 mg at 02/06/23 1614 **OR** ondansetron (ZOFRAN) injection 4 mg, 4 mg, IntraVENous, Q6H PRN, Mitra A Lump, APRN - CNP    polyethylene glycol (GLYCOLAX) packet 17 g, 17 g, Oral, Daily PRN, Mitra A Lump, APRN - CNP    acetaminophen (TYLENOL) tablet 650 mg, 650 mg, Oral, Q6H PRN, 650 mg at 02/08/23 1649 **OR** acetaminophen (TYLENOL) suppository 650 mg, 650 mg, Rectal, Q6H PRN, Olga Slough Lump, APRN - CNP     Continuous Infusions:   dextrose      sodium chloride 15 mL/hr at 02/08/23 1656       Vital Signs:    Patient Vitals for the past 24 hrs:   BP Temp Temp src Pulse Resp SpO2 Weight   02/09/23 0920 101/64 -- -- 84 -- -- --   02/09/23 0828 101/64 97 °F (36.1 °C) Temporal -- -- -- --   02/09/23 0803 (!) 113/58 97.1 °F (36.2 °C) Temporal 74 18 -- --   02/09/23 0400 137/74 97.5 °F (36.4 °C) Temporal 79 16 93 % --   02/09/23 0359 -- -- -- -- -- -- 262 lb (118.8 kg)   02/09/23 0316 -- -- -- -- 16 -- --   02/09/23 0000 117/68 98 °F (36.7 °C) Temporal 73 -- 100 % --   02/08/23 2000 102/60 98.3 °F (36.8 °C) Temporal 73 -- 96 % --   02/08/23 1830 -- -- -- -- 18 -- --   02/08/23 1726 (!) 141/77 -- -- -- -- -- --   02/08/23 1659 -- 97.4 °F (36.3 °C) Temporal -- -- -- --   02/08/23 1226 -- 97.7 °F (36.5 °C) Temporal -- -- -- --   02/08/23 1201 -- -- -- -- 16 -- --   02/08/23 1200 -- -- -- 75 17 94 % --        Physical Examination:    General Exam:    Constitutional: The patient is obese. Cardiovascular: S1 and S2, irregular rate and rhythm no overt murmurs. No carotid bruit and normal carotid pulse. Extremities: No cyanosis, no clubbing, and trace bilateral pedal edema. Ophthalmology/funduscopic exam: Unremarkable/normal.    Neurological Exam:    Dexterity: The patient is RIGHT handed.     Mental Status: Alert, Awake, Oriented x self, place and in part to situation, slow but, appropriate Speech and intact baseline level of comprehension - 1/3 repetition and recall. Follows 1-2 step commands. Fund of knowledge: [address]. Appropriate for her baseline. Attention/concentration: Fair. Appropriate for baseline. Cranial Nerves: CN-II, CN-III, CN-IV, CN-V, CN-VI, CN-VII, CN-VIII, CN-IX, CN-X, CN-XI and, CN-XII are within normal limits bilaterally. The patient is extremely hard of hearing in both ears. Motor:   Bulk = Symmetric and within normal limits bilaterally. Tone = Symmetric and within normal limits bilaterally. Strength = 5+/5 in all 4 extremities. DTRs = Trace throughout. Plantars = Down-going bilaterally. Abnormal movements = action tremors noted in both upper extremities. Opposition = Slow but, normal in both upper extremities. Pronator Drift = No gross pronator drift on the RIGHT and, no gross pronator drift on the LEFT side. Sensory:   Light touch, pinprick and vibration exams are within normal limits. Coordination:   Finger to nose is normal on the right side and, on the left side. Heel to shin is normal on the right side and, on the left side. Rapid alternation movements are normal on the right side and, on the left side. Gait:  Deferred due to patient's inability to participate at this time. Romberg:  Deferred due to patient's inability to participate at this time. NIHSS score:  N/A.     Results:    Labs:    Last 24hrs  Recent Results (from the past 24 hour(s))   POC Glucose Fingerstick    Collection Time: 02/08/23 12:25 PM   Result Value Ref Range    POC Glucose 202 (H) 65 - 105 mg/dL   POC Glucose Fingerstick    Collection Time: 02/08/23  5:13 PM   Result Value Ref Range    POC Glucose 184 (H) 65 - 105 mg/dL   POC Glucose Fingerstick    Collection Time: 02/08/23  7:54 PM   Result Value Ref Range    POC Glucose 209 (H) 65 - 105 mg/dL   Brain Natriuretic Peptide    Collection Time: 02/08/23  9:09 PM   Result Value Ref Range    Pro- (H) <300 pg/mL   Basic Metabolic Panel w/ Reflex to MG    Collection Time: 02/09/23  3:20 AM   Result Value Ref Range    Glucose 155 (H) 70 - 99 mg/dL    BUN 24 (H) 8 - 23 mg/dL    Creatinine 0.91 (H) 0.50 - 0.90 mg/dL    Est, Glom Filt Rate >60 >60 mL/min/1.73m2    Bun/Cre Ratio 26 (H) 9 - 20    Calcium 8.9 8.6 - 10.4 mg/dL    Sodium 132 (L) 135 - 144 mmol/L    Potassium 3.4 (L) 3.7 - 5.3 mmol/L    Chloride 96 (L) 98 - 107 mmol/L    CO2 26 20 - 31 mmol/L    Anion Gap 10 9 - 17 mmol/L   CBC with Auto Differential    Collection Time: 02/09/23  3:20 AM   Result Value Ref Range    WBC 8.9 3.5 - 11.3 k/uL    RBC 4.42 3.95 - 5.11 m/uL    Hemoglobin 12.0 11.9 - 15.1 g/dL    Hematocrit 38.4 36.3 - 47.1 %    MCV 86.9 82.6 - 102.9 fL    MCH 27.1 25.2 - 33.5 pg    MCHC 31.3 28.4 - 34.8 g/dL    RDW 14.4 11.8 - 14.4 %    Platelets 586 254 - 300 k/uL    MPV 10.7 8.1 - 13.5 fL    NRBC Automated 0.0 0.0 per 100 WBC    Seg Neutrophils 66 (H) 36 - 65 %    Lymphocytes 24 24 - 43 %    Monocytes 8 3 - 12 %    Eosinophils % 1 1 - 4 %    Basophils 0 0 - 2 %    Immature Granulocytes 1 (H) 0 %    Segs Absolute 5.93 1.50 - 8.10 k/uL    Absolute Lymph # 2.10 1.10 - 3.70 k/uL    Absolute Mono # 0.69 0.10 - 1.20 k/uL    Absolute Eos # 0.12 0.00 - 0.44 k/uL    Basophils Absolute 0.03 0.00 - 0.20 k/uL    Absolute Immature Granulocyte 0.06 0.00 - 0.30 k/uL   Magnesium    Collection Time: 02/09/23  3:20 AM   Result Value Ref Range    Magnesium 1.7 1.6 - 2.6 mg/dL   POC Glucose Fingerstick    Collection Time: 02/09/23  7:41 AM   Result Value Ref Range    POC Glucose 152 (H) 65 - 105 mg/dL     CSF:    Recent Labs     02/07/23  1824   CULTURE ESCHERICHIA COLI >994152 CFU/ML*     TSH:     Lab Results   Component Value Date    TSH 4.96 02/04/2023      Radiology Personal review:    Neuroimaging studies available for my review at this time. MRI study of the brain has been ordered and, remains pending.     Neurophysiology Results:    EEG: N/A      EMG/NCS: N/A.    ASSESSMENT / PLAN / RECOMMENDATIONS :    Assessment:    Recent onset headache that has now improved. Baseline MRDD. Baseline hypertension that is well controlled. Probable postconcussive headache, improving. Continue current Eliquis treatment for A-fib, per discretion of the ordering provider. Recommendations:    Continue current treatment and supportive care along with symptomatic care of headache. If the patient is unable to tolerate MRI study of the brain then, please obtain CT head without contrast study for further evaluation. No further or new neurological recommendations at this time. UTI treatment, per primary team.    Disposition/Discharge issues:    Neurology service will make further recommendations after reviewing the patient's head imaging study, other remains pending. Further recommendations, per primary MD other care providing teams. The above assessment plan was discussed with the nursing staff. Appreciate her assistance. Thank you.     Electronically signed by Isadora Cogan, MD on 2/9/2023 at 10:26 AM

## 2023-02-09 NOTE — DISCHARGE SUMMARY
[address] [place] [address].,    Adult Hospitalist      Patient ID: Avelino José  MRN: [id number]     Acct:  [de-identified]       Patient's PCP: Rosa M Stephenson MD    Admit Date: 2/4/2023     Discharge Date:   ***    Admitting Physician: Radha Morton MD    Discharge Physician: Marilynn Salinas MD     CONSULTANTS: Patient Care Team:  Rosa M Stephenson MD as PCP - General    PROCEDURES PERFORMED: ***    Active Discharge Diagnoses:  ***      Primary Problem  New onset of congestive heart failure [facility])    Hospital Course: ***    The plan was discussed in detail with patient who agreed with the plan and verbalized understanding . The patient was seen and examined on day of discharge and this discharge summary is in conjunction with any daily progress note from day of discharge.     Hospital Data:    Labs:    Hematology:  Recent Labs     02/07/23 0523 02/08/23 0335 02/09/23  0320   WBC 6.0 8.2 8.9   RBC 4.29 4.43 4.42   HGB 11.7* 12.0 12.0   HCT 37.1 38.3 38.4   MCV 86.5 86.5 86.9   MCH 27.3 27.1 27.1   MCHC 31.5 31.3 31.3   RDW 14.5* 14.4 14.4   * 145 154   MPV 10.6 10.5 10.7     Chemistry:  Recent Labs     02/06/23 2142 02/07/23 0523 02/08/23 0335 02/08/23  2109 02/09/23  0320   NA  --  134* 131*  --  132*   K  --  3.9 3.6*  --  3.4*   CL  --  102 96*  --  96*   CO2  --  22 23  --  26   GLUCOSE  --  149* 153*  --  155*   BUN  --  20 25*  --  24*   CREATININE  --  0.94* 0.82  --  0.91*   MG  --   --   --   --  1.7   ANIONGAP  --  10 12  --  10   LABGLOM  --  >60 >60  --  >60   CALCIUM  --  8.8 8.8  --  8.9   PROBNP 1,357*  --   --  815*  --      Recent Labs     02/08/23  0753 02/08/23  1225 02/08/23  1713 02/08/23  1954 02/09/23  0741 02/09/23  1202   POCGLU 132* 202* 184* 209* 152* 337*     Lab Results   Component Value Date    INR 1.7 02/05/2023    INR 1.1 08/24/2012    PROTIME 19.9 (H) 02/05/2023    PROTIME 11.4 08/24/2012     Lab Results   Component Value Date/Time    SPECIAL NOT REPORTED 2012 06:42 PM     Lab Results   Component Value Date/Time    CULTURE ESCHERICHIA COLI >[id number] CFU/ML (A) 2023 06:24 PM       No results found for: POCPH, PHART, PH, POCPCO2, LBQ6FTH, PCO2, POCPO2, PO2ART, PO2, POCHCO3, XTG0TKR, HCO3, NBEA, PBEA, BEART, BE, THGBART, THB, XKA3OGA, PIEM4EDN, N4RIPBRR, O2SAT, FIO2    Radiology:    XR CHEST PORTABLE    Result Date: 2023  Findings favoring moderate CHF pattern pulmonary vascular congestion. Difficult to exclude superimposed infection. Attention on follow-up.          All radiological studies reviewed      Reviews of Symptoms:    A 10 point system is reviewed and  negative except described in hospital course    Physical Exam:    Vitals:  /64   Pulse 83   Temp 97.1 °F (36.2 °C) (Temporal)   Resp 14   Ht 5' 11\" (1.803 m)   Wt 262 lb (118.8 kg)   SpO2 91%   BMI 36.54 kg/m²   Temp (24hrs), Av.5 °F (36.4 °C), Min:97 °F (36.1 °C), Max:98.3 °F (36.8 °C)      General appearance - alert, well appearing, and in no acute distress  Mental status - oriented to person, place, and time with normal affect  Head - normocephalic and atraumatic  Eyes - pupils equal and reactive, extraocular eye movements intact, conjunctiva clear  Ears - hearing appears to be intact  Nose - no drainage noted  Mouth - mucous membranes moist  Neck - supple, no carotid bruits, thyroid not palpable  Chest - clear to auscultation, normal effort  Heart - normal rate, regular rhythm, no murmur  Abdomen - soft, nontender, nondistended, bowel sounds present all four quadrants, no masses, hepatomegaly or splenomegaly  Neurological - normal speech, no focal findings or movement disorder noted, cranial nerves II through XII grossly intact  Extremities - peripheral pulses palpable, no pedal edema or calf pain with palpation  Skin - no gross lesions, rashes, or induration noted      Consults:  IP CONSULT TO HOSPITALIST  IP CONSULT TO CARDIOLOGY  IP CONSULT TO SOCIAL WORK  IP CONSULT TO INFECTIOUS DISEASES  IP CONSULT TO NEUROLOGY    Disposition: Home care    Discharged Condition: Stable    Follow Up: No follow-up provider specified. Lab Frequency Next Occurrence   Up with assistance PRN    Intake and output EVERY 8 HOURS    Initiate Oxygen Therapy Protocol PRN    Pulse Oximetry Spot Check PRN    Basic Metabolic Panel w/ Reflex to MG Daily (Lab)    CBC with Auto Differential Daily (Lab)    Pulse Oximetry Spot Check DAILY (RT)    Brain Natriuretic Peptide EVERY OTHER DAY    Nasal Cannula Oxygen DAILY (RT)    POCT Glucose 4 TIMES DAILY BEFORE MEALS & AT BEDTIME    HYPOGLYCEMIA TREATMENT: blood glucose LESS THAN 70 mg/dL and patient ALERT and TOLERATING PO PRN    HYPOGLYCEMIA TREATMENT: blood glucose LESS THAN 70 mg/dL and patient NOT ALERT or NPO PRN    POCT Glucose PRN          Diet: ADULT DIET; Regular; 4 carb choices (60 gm/meal);  Low Fat/Low Chol/High Fiber/HEIKE; 1800 ml    Discharge Medications:      Medication List        START taking these medications      ciprofloxacin 500 MG tablet  Commonly known as: CIPRO  Take 1 tablet by mouth 2 times daily for 6 days     furosemide 40 MG tablet  Commonly known as: LASIX  Take 1 tablet by mouth daily  Start taking on: February 10, 2023     sertraline 25 MG tablet  Commonly known as: ZOLOFT            CONTINUE taking these medications      apixaban 5 MG Tabs tablet  Commonly known as: ELIQUIS     atorvastatin 20 MG tablet  Commonly known as: LIPITOR     cyanocobalamin 1000 MCG tablet     fenofibrate 145 MG tablet  Commonly known as: TRICOR     glimepiride 4 MG tablet  Commonly known as: AMARYL     hydrOXYzine HCl 25 MG tablet  Commonly known as: ATARAX     metFORMIN 500 MG tablet  Commonly known as: GLUCOPHAGE     methenamine 1 g tablet  Commonly known as: HIPREX     metoprolol succinate 50 MG extended release tablet  Commonly known as: TOPROL XL     Myrbetriq 25 MG Tb24  Generic drug: mirabegron     NovoLOG FlexPen 100 UNIT/ML injection pen  Generic drug: insulin aspart     QUEtiapine 25 MG tablet  Commonly known as: SEROQUEL     spironolactone 25 MG tablet  Commonly known as: ALDACTONE            STOP taking these medications      Icosapent Ethyl 1 g Caps capsule  Commonly known as: VASCEPA               Where to Get Your Medications        These medications were sent to [address] [place] [address] [address] [address]      Phone: [phone]   ciprofloxacin 500 MG tablet  furosemide 40 MG tablet         Code Status:  Full Code    Time Spent on discharge is  {Blank single:29628::\"15 mins\",\"30 mins\",\"35 mins\",\"***\"} in patient examination, evaluation, counseling as well as medication reconciliation, prescriptions for required medications, discharge plan and follow up. Electronically signed by Marilynn Salinas MD on 2/9/2023 at 4:30 PM     Thank you Dr. Rosa M Stephenson MD for the opportunity to be involved in this patient's care. This note was created with the assistance of a speech-recognition program.  Although the intention is to generate a document that actually reflects the content of the visit, no guarantees can be provided that every mistake has been identified and corrected by editing. Note was updated later by me after  physical examination and  completion of the assessment. as well as medication reconciliation, prescriptions for required medications, discharge plan and follow up. Electronically signed by Raghu Mace MD on 2/14/2023 at 3:29 PM     Thank you Dr. Karina Castaneda MD for the opportunity to be involved in this patient's care. This note was created with the assistance of a speech-recognition program.  Although the intention is to generate a document that actually reflects the content of the visit, no guarantees can be provided that every mistake has been identified and corrected by editing. Note was updated later by me after  physical examination and  completion of the assessment.

## 2023-02-09 NOTE — PROGRESS NOTES
[address]  Occupational Therapy Not Seen    DATE: 2023    NAME: Tessa Munoz  MRN: [id number]   : 1960    Patient not seen this date for Occupational Therapy due to:      [x] Cancel by RN or physician due to: Chronic headache and awaiting neuro testing per RN     [] Hemodialysis    [] Critical Lab Value Level     [] Blood transfusion in progress    [] Acute or unstable cardiovascular status   _MAP < 55 or more than >115  _HR < 40 or > 130    [] Acute or unstable pulmonary status   -FiO2 > 60%   _RR < 5 or >40    _O2 sats < 85%    [] Strict Bedrest    [] Off Unit for surgery or procedure    [] Off Unit for testing       [] Pending imaging to R/O fracture    [] Refusal by Patient      [] Other      [] OT being discontinued at this time. Patient independent. No further needs. [] OT being discontinued at this time as the patient has been transferred to hospice care. No further needs.       Carly Roca, JOANNA

## 2023-02-09 NOTE — PROGRESS NOTES
[facility] Hospital[facility]        2/9/2023   3:00 PM    Name:  Pito Shannon  MRN:    [id number]     [place]:     [de-identified]   Room:  2035/2035-01  IP Day: 5     Admit Date: 2/4/2023  6:38 PM  PCP: Zion Herrera MD    C/C:   Chief Complaint   Patient presents with    Fall     Two falls at group home today    Extremity Weakness     Ambulates with walker but group home concerned pt is unable to get around anymore        Assessment:        Acute diastolic congestive heart failure  Elevated troponin  Paroxysmal supraventricular tachycardia  Hypertension  Hyperlipidemia  Diabetes type 2  History of CVA  UTI- E.  Coli  Headache        Plan:        Patient is currently in CVU  O2 maintain oxygen saturation greater than 92%    Monitor respiratory status  Lasix 40 mg IV daily we will switch to p.o.  Echocardiogram  Serial troponin  IV Lopressor as needed  Cardiology consult      UA positive  Urine culture-E. coli  IV ceftriaxone  ID on consult    Neurology following for headache  MRI brain without contrast was ordered    DVT and GI prophylaxis  Continue to monitor/telemetry/CBC with differential daily/BMP daily  Continue medications as below    Discharge planning     Scheduled Meds:   cefTRIAXone (ROCEPHIN) IV  1,000 mg IntraVENous Q24H    furosemide  40 mg Oral Daily    apixaban  5 mg Oral BID    atorvastatin  20 mg Oral Nightly    metoprolol succinate  50 mg Oral Daily    QUEtiapine  25 mg Oral Nightly    sertraline  25 mg Oral Daily    spironolactone  25 mg Oral Daily    fenofibrate  54 mg Oral Daily    glipiZIDE  2.5 mg Oral QAM AC    omega-3 acid ethyl esters  1 g Oral BID    insulin lispro  0-8 Units SubCUTAneous TID WC    insulin lispro  0-4 Units SubCUTAneous Nightly    adenosine  6 mg IntraVENous Once    sodium chloride flush  5-40 mL IntraVENous 2 times per day     Continuous Infusions:   dextrose      sodium chloride 15 mL/hr at 02/08/23 1656     PRN Meds:  traMADol, 50 mg, Q6H PRN  hydrOXYzine HCl, 25 mg, Nightly PRN  glucose, 4 tablet, PRN  dextrose bolus, 125 mL, PRN   Or  dextrose bolus, 250 mL, PRN  glucagon (rDNA), 1 mg, PRN  dextrose, , Continuous PRN  sodium chloride flush, 10 mL, PRN  sodium chloride, , PRN  potassium chloride, 40 mEq, PRN   Or  potassium alternative oral replacement, 40 mEq, PRN   Or  potassium chloride, 10 mEq, PRN  magnesium sulfate, 1,000 mg, PRN  ondansetron, 4 mg, Q8H PRN   Or  ondansetron, 4 mg, Q6H PRN  polyethylene glycol, 17 g, Daily PRN  acetaminophen, 650 mg, Q6H PRN   Or  acetaminophen, 650 mg, Q6H PRN          Subjective:     Patient seen and examined at bedside. No overnight events. No acute complaints today. Afebrile  Pt. Denies any CP, SOB, palpitation, HA, dizziness, chills, cough, cold, changes in urination, BM or skin changes. Patient has been complaining of headache not improved with pain medicines. ROS:  A 10 point system reviewed and negative otherwise mentioned above. Physical Examination:      Vitals:  /64   Pulse 83   Temp 97.1 °F (36.2 °C) (Temporal)   Resp 14   Ht 5' 11\" (1.803 m)   Wt 262 lb (118.8 kg)   SpO2 91%   BMI 36.54 kg/m²   Temp (24hrs), Av.5 °F (36.4 °C), Min:97 °F (36.1 °C), Max:98.3 °F (36.8 °C)    Weight:   Wt Readings from Last 3 Encounters:   23 262 lb (118.8 kg)   23 200 lb (90.7 kg)     I/O last 3 completed shifts:  I/O last 3 completed shifts:   In: 1140 [P.O.:1140]  Out: 1350 [Urine:1350]     Recent Labs     23  1713 23  1954 23  0741 23  1202   POCGLU 184* 209* 152* 337*           General appearance - alert, well appearing, and in no acute distress  Mental status - oriented to person, place, and time with normal affect  Head - normocephalic and atraumatic  Eyes - pupils equal and reactive, extraocular eye movements intact, conjunctiva clear  Ears - hearing appears to be intact  Nose - no drainage noted  Mouth - mucous membranes moist  Neck - supple, no carotid bruits, thyroid not palpable  Chest - clear to auscultation, normal effort  Heart - normal rate, regular rhythm, no murmur  Abdomen - soft, nontender, nondistended, bowel sounds present all four quadrants, no masses, hepatomegaly or splenomegaly  Neurological - normal speech, no focal findings or movement disorder noted, cranial nerves II through XII grossly intact  Extremities - peripheral pulses palpable, no pedal edema or calf pain with palpation  Skin - no gross lesions, rashes, or induration noted        Medications:      Allergies: No Known Allergies    Current Meds:   Current Facility-Administered Medications:     traMADol (ULTRAM) tablet 50 mg, 50 mg, Oral, Q6H PRN, Nicanor Hooker MD, 50 mg at 02/09/23 0910    cefTRIAXone (ROCEPHIN) 1,000 mg in sodium chloride 0.9 % 50 mL IVPB (mini-bag), 1,000 mg, IntraVENous, Q24H, Zhanna Pemberton, APRN - CNP, Stopped at 02/09/23 1229    furosemide (LASIX) tablet 40 mg, 40 mg, Oral, Daily, Nicanor Hooker MD, 40 mg at 02/09/23 0910    apixaban (ELIQUIS) tablet 5 mg, 5 mg, Oral, BID, Quentin Bustos MD, 5 mg at 02/09/23 0911    atorvastatin (LIPITOR) tablet 20 mg, 20 mg, Oral, Nightly, Quentin Bustos MD, 20 mg at 02/08/23 2033    hydrOXYzine HCl (ATARAX) tablet 25 mg, 25 mg, Oral, Nightly PRN, Quentin Bustos MD    metoprolol succinate (TOPROL XL) extended release tablet 50 mg, 50 mg, Oral, Daily, Quentin Bustos MD, 50 mg at 02/08/23 0854    QUEtiapine (SEROQUEL) tablet 25 mg, 25 mg, Oral, Nightly, Quentin Bustos MD, 25 mg at 02/08/23 2033    sertraline (ZOLOFT) tablet 25 mg, 25 mg, Oral, Daily, Quentin Bustos MD, 25 mg at 02/09/23 0910    spironolactone (ALDACTONE) tablet 25 mg, 25 mg, Oral, Daily, Quentin Bustos MD, 25 mg at 02/09/23 0910    fenofibrate (TRICOR) tablet 54 mg, 54 mg, Oral, Daily, Quentin Bustos MD, 54 mg at 02/09/23 0917    glipiZIDE (GLUCOTROL) tablet 2.5 mg, 2.5 mg, Oral, QAM AC, Quentin Bustos MD, 2.5 mg at 02/09/23 0911    omega-3 acid ethyl esters (LOVAZA) capsule 1 g, 1 g, Oral, BID, Quentin Bustos MD, 1 g at 02/09/23 0910    insulin lispro (HUMALOG) injection vial 0-8 Units, 0-8 Units, SubCUTAneous, TID WC, Quentin Bustos MD, 6 Units at 02/09/23 1223    insulin lispro (HUMALOG) injection vial 0-4 Units, 0-4 Units, SubCUTAneous, Nightly, Quentin Bustos MD    glucose chewable tablet 16 g, 4 tablet, Oral, PRN, Quentin Bustos MD    dextrose bolus 10% 125 mL, 125 mL, IntraVENous, PRN **OR** dextrose bolus 10% 250 mL, 250 mL, IntraVENous, PRN, Quentin Bustos MD    glucagon (rDNA) injection 1 mg, 1 mg, SubCUTAneous, PRN, Quentin Bustos MD    dextrose 10 % infusion, , IntraVENous, Continuous PRN, Quentin Bustos MD    adenosine (ADENOCARD) injection 6 mg, 6 mg, IntraVENous, Once, Aric García MD    sodium chloride flush 0.9 % injection 5-40 mL, 5-40 mL, IntraVENous, 2 times per day, Mitra A Lump, APRN - CNP, 10 mL at 02/09/23 0922    sodium chloride flush 0.9 % injection 10 mL, 10 mL, IntraVENous, PRN, Mitra A Lump, APRN - CNP, 10 mL at 02/09/23 0631    0.9 % sodium chloride infusion, , IntraVENous, PRN, Orysia Moll Lump, APRN - CNP, Last Rate: 15 mL/hr at 02/08/23 1656, New Bag at 02/08/23 1656    potassium chloride (KLOR-CON M) extended release tablet 40 mEq, 40 mEq, Oral, PRN, 40 mEq at 02/09/23 0910 **OR** potassium bicarb-citric acid (EFFER-K) effervescent tablet 40 mEq, 40 mEq, Oral, PRN **OR** potassium chloride 10 mEq/100 mL IVPB (Peripheral Line), 10 mEq, IntraVENous, PRN, Mitra A Lump, APRN - CNP    magnesium sulfate 1000 mg in dextrose 5% 100 mL IVPB, 1,000 mg, IntraVENous, PRN, Mitra A Lump, APRN - CNP    ondansetron (ZOFRAN-ODT) disintegrating tablet 4 mg, 4 mg, Oral, Q8H PRN, 4 mg at 02/06/23 1614 **OR** ondansetron (ZOFRAN) injection 4 mg, 4 mg, IntraVENous, Q6H PRN, Mitra A Lump, APRN - CNP    polyethylene glycol (GLYCOLAX) packet 17 g, 17 g, Oral, Daily PRN, Mitra A Lump, APRN - CNP    acetaminophen (TYLENOL) tablet 650 mg, 650 mg, Oral, Q6H PRN, 650 mg at 02/08/23 1649 **OR** acetaminophen (TYLENOL) suppository 650 mg, 650 mg, Rectal, Q6H PRN, Mitra Kevin APRN - CNP      I/O (24Hr): Intake/Output Summary (Last 24 hours) at 2/9/2023 1500  Last data filed at 2/9/2023 0956  Gross per 24 hour   Intake 840 ml   Output 500 ml   Net 340 ml         Data:           Labs:    Hematology:  Recent Labs     02/07/23 0523 02/08/23 0335 02/09/23  0320   WBC 6.0 8.2 8.9   RBC 4.29 4.43 4.42   HGB 11.7* 12.0 12.0   HCT 37.1 38.3 38.4   MCV 86.5 86.5 86.9   MCH 27.3 27.1 27.1   MCHC 31.5 31.3 31.3   RDW 14.5* 14.4 14.4   * 145 154   MPV 10.6 10.5 10.7       Chemistry:  Recent Labs     02/06/23 2142 02/07/23 0523 02/08/23 0335 02/08/23 2109 02/09/23  0320   NA  --  134* 131*  --  132*   K  --  3.9 3.6*  --  3.4*   CL  --  102 96*  --  96*   CO2  --  22 23  --  26   GLUCOSE  --  149* 153*  --  155*   BUN  --  20 25*  --  24*   CREATININE  --  0.94* 0.82  --  0.91*   MG  --   --   --   --  1.7   ANIONGAP  --  10 12  --  10   LABGLOM  --  >60 >60  --  >60   CALCIUM  --  8.8 8.8  --  8.9   PROBNP 1,357*  --   --  815*  --        Recent Labs     02/08/23  0753 02/08/23  1225 02/08/23  1713 02/08/23  1954 02/09/23  0741 02/09/23  1202   POCGLU 132* 202* 184* 209* 152* 337*         Lab Results   Component Value Date/Time    SPECIAL NOT REPORTED 08/29/2012 06:42 PM     Lab Results   Component Value Date/Time    CULTURE ESCHERICHIA COLI >[id number] CFU/ML (A) 02/07/2023 06:24 PM       No results found for: POCPH, PHART, PH, POCPCO2, HHH1UPD, PCO2, POCPO2, PO2ART, PO2, POCHCO3, ACA5XAU, HCO3, NBEA, PBEA, BEART, BE, THGBART, THB, RPW6GIR, QDHM3QID, A3VRPMCX, O2SAT, FIO2    Radiology:    XR RADIUS ULNA LEFT (2 VIEWS)    Result Date: 2/2/2023  No acute osseous abnormality. XR CHEST PORTABLE    Result Date: 2/4/2023  Findings favoring moderate CHF pattern pulmonary vascular congestion. Difficult to exclude superimposed infection. Attention on follow-up.      XR CHEST PORTABLE    Result Date: 2/2/2023  No acute process. All radiological studies reviewed  Code Status:  Full Code        Electronically signed by Darya Hanley MD on 2/9/2023 at 3:00 PM    This note was created with the assistance of a speech-recognition program.  Although the intention is to generate a document that actually reflects the content of the visit, no guarantees can be provided that every mistake has been identified and corrected by editing. Note was updated later by me after  physical examination and  completion of the assessment.

## 2023-02-09 NOTE — PROGRESS NOTES
Physical Therapy  DATE: 2023    NAME: Jamla Montemayor  MRN: [id number]   : 1960    Patient not seen this date for Physical Therapy due to:      [x] Cancel by RN or physician due to: Patient with uncontrolled headaches. Per RN patient with be having Neuro tests and is not appropriate for therapy this date    [] Hemodialysis    [] Critical Lab Value Level     [] Blood transfusion in progress    [] Acute or unstable cardiovascular status   _MAP < 55 or more than >115  _HR < 40 or > 130    [] Acute or unstable pulmonary status   -FiO2 > 60%   _RR < 5 or >40    _O2 sats < 85%    [] Strict Bedrest    [] Off Unit for surgery or procedure    [] Off Unit for testing       [] Pending imaging to R/O fracture    [] Refusal by Patient      [] Other      [] PT being discontinued at this time. Patient independent. No further needs. [] PT being discontinued at this time as the patient has been transferred to hospice care. No further needs.       Jimenez Cruz, PTA

## 2023-02-09 NOTE — CARE COORDINATION
Discharge planning    Pt. CHIQU[place]AD. Has all DME at home. Lives in a rental home (by family). Sister Jerica Cantu [phone] [facility] Neuro consult and MRI brain ordered. Waiting on blood cultures. Continue to follow.

## 2023-02-10 ENCOUNTER — APPOINTMENT (OUTPATIENT)
Dept: MRI IMAGING | Age: 63
End: 2023-02-10
Payer: MEDICARE

## 2023-02-10 ENCOUNTER — APPOINTMENT (OUTPATIENT)
Dept: CT IMAGING | Age: 63
End: 2023-02-10
Payer: MEDICARE

## 2023-02-10 VITALS
HEIGHT: 71 IN | TEMPERATURE: 97.8 F | HEART RATE: 74 BPM | RESPIRATION RATE: 19 BRPM | WEIGHT: 262.4 LBS | OXYGEN SATURATION: 95 % | DIASTOLIC BLOOD PRESSURE: 63 MMHG | BODY MASS INDEX: 36.73 KG/M2 | SYSTOLIC BLOOD PRESSURE: 118 MMHG

## 2023-02-10 LAB
ABSOLUTE EOS #: <0.03 K/UL (ref 0–0.44)
ABSOLUTE IMMATURE GRANULOCYTE: 0.09 K/UL (ref 0–0.3)
ABSOLUTE LYMPH #: 1.33 K/UL (ref 1.1–3.7)
ABSOLUTE MONO #: 0.69 K/UL (ref 0.1–1.2)
ANION GAP SERPL CALCULATED.3IONS-SCNC: 11 MMOL/L (ref 9–17)
BASOPHILS # BLD: 0 % (ref 0–2)
BASOPHILS ABSOLUTE: <0.03 K/UL (ref 0–0.2)
BUN SERPL-MCNC: 44 MG/DL (ref 8–23)
BUN/CREAT BLD: 49 (ref 9–20)
CALCIUM SERPL-MCNC: 9.2 MG/DL (ref 8.6–10.4)
CHLORIDE SERPL-SCNC: 100 MMOL/L (ref 98–107)
CO2 SERPL-SCNC: 23 MMOL/L (ref 20–31)
CREAT SERPL-MCNC: 0.9 MG/DL (ref 0.5–0.9)
EOSINOPHILS RELATIVE PERCENT: 0 % (ref 1–4)
GFR SERPL CREATININE-BSD FRML MDRD: >60 ML/MIN/1.73M2
GLUCOSE BLD-MCNC: 202 MG/DL (ref 65–105)
GLUCOSE BLD-MCNC: 221 MG/DL (ref 65–105)
GLUCOSE BLD-MCNC: 255 MG/DL (ref 65–105)
GLUCOSE SERPL-MCNC: 298 MG/DL (ref 70–99)
HCT VFR BLD AUTO: 36.6 % (ref 36.3–47.1)
HGB BLD-MCNC: 11.4 G/DL (ref 11.9–15.1)
IMMATURE GRANULOCYTES: 1 %
LYMPHOCYTES # BLD: 15 % (ref 24–43)
MCH RBC QN AUTO: 26.8 PG (ref 25.2–33.5)
MCHC RBC AUTO-ENTMCNC: 31.1 G/DL (ref 28.4–34.8)
MCV RBC AUTO: 85.9 FL (ref 82.6–102.9)
MICROORGANISM SPEC CULT: ABNORMAL
MONOCYTES # BLD: 8 % (ref 3–12)
NRBC AUTOMATED: 0 PER 100 WBC
PDW BLD-RTO: 14 % (ref 11.8–14.4)
PLATELET # BLD AUTO: 188 K/UL (ref 138–453)
PMV BLD AUTO: 11.1 FL (ref 8.1–13.5)
POTASSIUM SERPL-SCNC: 4.3 MMOL/L (ref 3.7–5.3)
RBC # BLD: 4.26 M/UL (ref 3.95–5.11)
SEG NEUTROPHILS: 76 % (ref 36–65)
SEGMENTED NEUTROPHILS ABSOLUTE COUNT: 7.07 K/UL (ref 1.5–8.1)
SODIUM SERPL-SCNC: 134 MMOL/L (ref 135–144)
SPECIMEN DESCRIPTION: ABNORMAL
WBC # BLD AUTO: 9.2 K/UL (ref 3.5–11.3)

## 2023-02-10 PROCEDURE — 6360000002 HC RX W HCPCS: Performed by: NURSE PRACTITIONER

## 2023-02-10 PROCEDURE — 82947 ASSAY GLUCOSE BLOOD QUANT: CPT

## 2023-02-10 PROCEDURE — 6370000000 HC RX 637 (ALT 250 FOR IP): Performed by: FAMILY MEDICINE

## 2023-02-10 PROCEDURE — 94761 N-INVAS EAR/PLS OXIMETRY MLT: CPT

## 2023-02-10 PROCEDURE — 6370000000 HC RX 637 (ALT 250 FOR IP): Performed by: HOSPITALIST

## 2023-02-10 PROCEDURE — 97535 SELF CARE MNGMENT TRAINING: CPT

## 2023-02-10 PROCEDURE — 70450 CT HEAD/BRAIN W/O DYE: CPT

## 2023-02-10 PROCEDURE — 97530 THERAPEUTIC ACTIVITIES: CPT

## 2023-02-10 PROCEDURE — 6370000000 HC RX 637 (ALT 250 FOR IP): Performed by: NURSE PRACTITIONER

## 2023-02-10 PROCEDURE — 80048 BASIC METABOLIC PNL TOTAL CA: CPT

## 2023-02-10 PROCEDURE — 2580000003 HC RX 258: Performed by: NURSE PRACTITIONER

## 2023-02-10 PROCEDURE — 85025 COMPLETE CBC W/AUTO DIFF WBC: CPT

## 2023-02-10 PROCEDURE — 70551 MRI BRAIN STEM W/O DYE: CPT

## 2023-02-10 PROCEDURE — 99231 SBSQ HOSP IP/OBS SF/LOW 25: CPT | Performed by: INTERNAL MEDICINE

## 2023-02-10 PROCEDURE — 36415 COLL VENOUS BLD VENIPUNCTURE: CPT

## 2023-02-10 RX ADMIN — SODIUM CHLORIDE 25 ML: 9 INJECTION, SOLUTION INTRAVENOUS at 11:50

## 2023-02-10 RX ADMIN — APIXABAN 5 MG: 5 TABLET, FILM COATED ORAL at 09:00

## 2023-02-10 RX ADMIN — FUROSEMIDE 40 MG: 40 TABLET ORAL at 08:58

## 2023-02-10 RX ADMIN — METOPROLOL SUCCINATE 50 MG: 50 TABLET, EXTENDED RELEASE ORAL at 08:59

## 2023-02-10 RX ADMIN — INSULIN LISPRO 4 UNITS: 100 INJECTION, SOLUTION INTRAVENOUS; SUBCUTANEOUS at 09:06

## 2023-02-10 RX ADMIN — CEFTRIAXONE SODIUM 1000 MG: 1 INJECTION, POWDER, FOR SOLUTION INTRAMUSCULAR; INTRAVENOUS at 11:57

## 2023-02-10 RX ADMIN — ACETAMINOPHEN 650 MG: 325 TABLET ORAL at 12:00

## 2023-02-10 RX ADMIN — FENOFIBRATE 54 MG: 54 TABLET ORAL at 08:58

## 2023-02-10 RX ADMIN — INSULIN LISPRO 4 UNITS: 100 INJECTION, SOLUTION INTRAVENOUS; SUBCUTANEOUS at 12:30

## 2023-02-10 RX ADMIN — SPIRONOLACTONE 25 MG: 25 TABLET ORAL at 09:00

## 2023-02-10 RX ADMIN — GLIPIZIDE 2.5 MG: 5 TABLET ORAL at 05:07

## 2023-02-10 RX ADMIN — SERTRALINE 25 MG: 50 TABLET, FILM COATED ORAL at 08:58

## 2023-02-10 RX ADMIN — SODIUM CHLORIDE, PRESERVATIVE FREE 10 ML: 5 INJECTION INTRAVENOUS at 09:12

## 2023-02-10 RX ADMIN — OMEGA-3-ACID ETHYL ESTERS 1 G: 1 CAPSULE, LIQUID FILLED ORAL at 08:59

## 2023-02-10 ASSESSMENT — PAIN SCALES - GENERAL
PAINLEVEL_OUTOF10: 0
PAINLEVEL_OUTOF10: 0
PAINLEVEL_OUTOF10: 4

## 2023-02-10 ASSESSMENT — ENCOUNTER SYMPTOMS
GASTROINTESTINAL NEGATIVE: 1
RESPIRATORY NEGATIVE: 1

## 2023-02-10 ASSESSMENT — PAIN DESCRIPTION - DESCRIPTORS: DESCRIPTORS: ACHING

## 2023-02-10 ASSESSMENT — PAIN DESCRIPTION - LOCATION: LOCATION: HEAD

## 2023-02-10 NOTE — PROGRESS NOTES
Neurology update: The patient's MRI study of the brain is pending and is expected to be performed today. Neurology service will make further recommendations if/as needed after reviewing the patient's MRI study of the brain. No new recommendations at this time. Further recommendations, per primary team.    Thank you.

## 2023-02-10 NOTE — PROGRESS NOTES
POA- sister, Rhys Oneill. Updated on pt, let her know the MRI is scheduled for 2 pm today. Antibiotic switched from IV rocephin to PO cipro upon discharge. She requested pt to get up to the chair, let us know that we can call her from pt's room if pt refuses to get out of bed.

## 2023-02-10 NOTE — PROGRESS NOTES
[facility]  Progress Note    Govind Domingo  MRN: [id number]  Date: 2/10/2023  LOS: 6     Reason for F/U :   Possible urinary tract infection    Impression :   E. coli urinary tract infection  Frequent falls  New onset congestive heart failure  Paroxysmal SVT  Developmental disability  Diabetes mellitus  Hypertension    Recommendations: The patient continues on intravenous antimicrobial therapy with Rocephin  The sensitivity data is available and the patient is okay for discharge on oral antimicrobial therapy  The medication list has been reconciled to oral ciprofloxacin ready by the primary team  Infectious disease wise the patient is okay for discharge    Infection Control Recommendations:   Universal precautions    Discharge Planning:   Patient will need Midline Catheter Insertion/ PICC line Insertion: No  Patient will need: Home IV , [place],  SNF,  LTAC: Undetermined  Patient willneed outpatient wound care: No    Medical Decision making / Summary of Stay:   Govind Domingo is a 60y.o.-year-old female who was initially admitted on 2/4/2023. The patient has known medical history of diabetes mellitus, hypertension, CVA, MRDD. She is not a great historian, most information was obtained from the patient's chart and the nursing staff. She reportedly lives in a \"group home\" which upon further questioning is actually a home where she and her brother reside and family have caregivers coming in to take care of her. Just prior to admission, she had fallen a Few times, the patient tells me she tripped over the rug. The sister also states that she had been confused for a couple of days and usually when this happens she has a urinary tract infection. She did not have any fevers. Patient was admitted, diagnosed with new onset congestive heart failure, paroxysmal supraventricular tachycardia. Yesterday she was initiated on cefepime due to concern for possible urinary tract infection.   Urinalysis with too numerous WBCs to count, positive for nitrates, moderate leuk esterase, many bacteria. We were consulted due to concern for urinary tract infection. Current evaluation:2/10/2023    /61   Pulse 82   Temp 97.8 °F (36.6 °C) (Temporal)   Resp 16   Ht 5' 11\" (1.803 m)   Wt 262 lb 6.4 oz (119 kg)   SpO2 95%   BMI 36.60 kg/m²     Temperature Range: Temp: 97.8 °F (36.6 °C) Temp  Av.7 °F (36.5 °C)  Min: 97.1 °F (36.2 °C)  Max: 98.2 °F (36.8 °C)  The patient is seen and evaluated at bedside she is awake and alert in no acute distress. No subjective fever or chills. No cough or shortness of breath. No abdominal pain nausea vomiting or diarrhea. No chest pains or palpitations. Dysuria or frequency. Review of Systems   Constitutional: Negative. Respiratory: Negative. Cardiovascular: Negative. Gastrointestinal: Negative. Genitourinary: Negative. Musculoskeletal: Negative. Skin: Negative. Neurological: Negative. Psychiatric/Behavioral: Negative. Physical Examination :     Physical Exam  Constitutional:       Appearance: She is well-developed. She is obese. HENT:      Head: Normocephalic and atraumatic. Cardiovascular:      Rate and Rhythm: Regular rhythm. Heart sounds: Normal heart sounds. Pulmonary:      Effort: Pulmonary effort is normal.      Breath sounds: Normal breath sounds. Abdominal:      General: Bowel sounds are normal.      Palpations: Abdomen is soft. Musculoskeletal:      Cervical back: Normal range of motion and neck supple. Skin:     General: Skin is warm and dry. Neurological:      Mental Status: She is alert and oriented to person, place, and time.        Laboratory data:   I have independently reviewed the followinglabs:  CBC with Differential:   Recent Labs     230 02/10/23  0335   WBC 8.9 9.2   HGB 12.0 11.4*   HCT 38.4 36.6    188   LYMPHOPCT 24 15*   MONOPCT 8 8       BMP:   Recent Labs     230 02/10/23  0335   * 134*   K 3.4* 4.3   CL 96* 100   CO2 26 23   BUN 24* 44*   CREATININE 0.91* 0.90   MG 1.7  --        Hepatic Function Panel: No results for input(s): PROT, LABALBU, BILIDIR, IBILI, BILITOT, ALKPHOS, ALT, AST in the last 72 hours. No results found for: PROCAL  No results found for: CRP  No results found for: SEDRATE      No results found for: DDIMER  No results found for: FERRITIN  No results found for: LDH  No results found for: FIBRINOGEN    No results found for requested labs within last 30 days. No results found for: COVID19    No results for input(s): VANCOTROUGH in the last 72 hours. Imaging Studies:   No new imaging    Cultures:     Culture, Urine [[id number]] (Abnormal) Collected: 02/07/23 1824   Order Status: Completed Specimen: Urine, straight catheter Updated: 02/09/23 0803    Specimen Description . URINE,STRAIGHT CATHETER    Culture ESCHERICHIA COLI >[id number] CFU/ML Abnormal      Susceptibility    Escherichia coli (1)    Antibiotic Interpretation Microscan Method Status    ampicillin Resistant  BACTERIAL SUSCEPTIBILITY PANEL MANNY Preliminary    ceFAZolin Resistant >=64 BACTERIAL SUSCEPTIBILITY PANEL MANNY Preliminary     Cefazolin sensitivity results can be used to predict the effectiveness of oral   cephalosporins (eg.  Cephalexin) in uncomplicated Urinary Tract Infections due to E. coli, K.    pneumoniae, and P. mirabilis        cefTRIAXone Sensitive <=0.25 BACTERIAL SUSCEPTIBILITY PANEL MANNY Preliminary    Confirmatory Extended Spectrum Beta-Lactamase Sensitive NEGATIVE BACTERIAL SUSCEPTIBILITY PANEL MANNY Preliminary    gentamicin Sensitive <=1 BACTERIAL SUSCEPTIBILITY PANEL MANNY Preliminary    levofloxacin Sensitive <=0.12 BACTERIAL SUSCEPTIBILITY PANEL MANNY Preliminary    nitrofurantoin Intermediate 64 BACTERIAL SUSCEPTIBILITY PANEL MANNY Preliminary    piperacillin-tazobactam Sensitive <=4 BACTERIAL SUSCEPTIBILITY PANEL MANNY Preliminary    tobramycin Sensitive <=1 BACTERIAL SUSCEPTIBILITY PANEL MANNY Preliminary    trimethoprim-sulfamethoxazole Sensitive <=20 BACTERIAL SUSCEPTIBILITY PANEL MANNY Preliminary          Specimen Collected: 02/07/23 18:24 EST Last Resulted: 02/10/23 07:14 EST                Medications:      insulin lispro  0-16 Units SubCUTAneous TID WC    insulin lispro  0-4 Units SubCUTAneous Nightly    cefTRIAXone (ROCEPHIN) IV  1,000 mg IntraVENous Q24H    furosemide  40 mg Oral Daily    apixaban  5 mg Oral BID    atorvastatin  20 mg Oral Nightly    metoprolol succinate  50 mg Oral Daily    QUEtiapine  25 mg Oral Nightly    sertraline  25 mg Oral Daily    spironolactone  25 mg Oral Daily    fenofibrate  54 mg Oral Daily    glipiZIDE  2.5 mg Oral QAM AC    omega-3 acid ethyl esters  1 g Oral BID    adenosine  6 mg IntraVENous Once    sodium chloride flush  5-40 mL IntraVENous 2 times per day       Electronically signed by Lina Saunders MD on 2/10/2023 at 10:25 AM      [facility]  Lina Saunders MD  [employer] messaging  OFFICE: [phone]    Thank you for allowing us to participate in the care of this patient. Please call with questions. This note is created with the assistance of a speech recognition program.  While intending to generate a document that actually reflects the content of the visit, the document can still have some errors including those of syntax and sound a like substitutions which may escape proof reading. In such instances, actual meaning can be extrapolated by contextual diversion.

## 2023-02-10 NOTE — PLAN OF CARE
Problem: Chronic Conditions and Co-morbidities  Goal: Patient's chronic conditions and co-morbidity symptoms are monitored and maintained or improved  2/10/2023 1726 by Nithin Cuevas RN  Outcome: Adequate for Discharge     Problem: Discharge Planning  Goal: Discharge to home or other facility with appropriate resources  2/10/2023 1726 by Nithin Cuevas RN  Outcome: Adequate for Discharge     Problem: ABCDS Injury Assessment  Goal: Absence of physical injury  2/10/2023 1726 by Nithin Cuevas RN  Outcome: Adequate for Discharge     Problem: Skin/Tissue Integrity  Goal: Absence of new skin breakdown  Description: 1. Monitor for areas of redness and/or skin breakdown  2. Assess vascular access sites hourly  3. Every 4-6 hours minimum:  Change oxygen saturation probe site  4. Every 4-6 hours:  If on nasal continuous positive airway pressure, respiratory therapy assess nares and determine need for appliance change or resting period.   2/10/2023 1726 by Nithin Cuevas RN  Outcome: Adequate for Discharge     Problem: Safety - Adult  Goal: Free from fall injury  2/10/2023 1726 by Nithin Cuevas RN  Outcome: Adequate for Discharge     Problem: Pain  Goal: Verbalizes/displays adequate comfort level or baseline comfort level  2/10/2023 1726 by Nithin Cuevas RN  Outcome: Adequate for Discharge

## 2023-02-10 NOTE — FLOWSHEET NOTE
End Of Shift Note  [facility]  Summary of shift: Pt had an uneventful night. Pt remained alert and orientedx4 throughout shift. Pt washed and cleaned up twice throughout shift due to incontinence. Pt did not complain of any headaches or reference any pain to writer. Pt able to rest comfortable throughout night. Plan for MRI  study of the brain today.        Vitals:    Vitals:    02/09/23 2000 02/10/23 0000 02/10/23 0016 02/10/23 0400   BP: 107/62 114/65  121/74   Pulse: 74 69 74 71   Resp: 15 14 13 15   Temp: 97.3 °F (36.3 °C) 98.2 °F (36.8 °C)  97.5 °F (36.4 °C)   TempSrc: Temporal Temporal  Temporal   SpO2: 94% 94% 94% 100%   Weight:  262 lb 6.4 oz (119 kg)     Height:            I&O:   Intake/Output Summary (Last 24 hours) at 2/10/2023 0551  Last data filed at 2/10/2023 0550  Gross per 24 hour   Intake 720 ml   Output 700 ml   Net 20 ml       Resp Status: Room air           LDA:   Peripheral IV 02/04/23 Right Forearm (Active)   Number of days: 5       External Urinary Catheter (Active)   Number of days: 2

## 2023-02-10 NOTE — CARE COORDINATION
Discharge Planning    Phone call to pt's sister Rhys Oneill at [phone] regarding ? Need for any home care for skilled therapy. Pt is MRDD and has 24/7 caregiver in her home. Unable to confirm who that caregiver is and who they work for. Await call back from sister. Addendum  Received call back from sister Rhys Oneill. Updated her that pt did not walk today. Rhys Oneill states pt is not that active but she can walk and has to be pushed to do the therapy. She feels she will do better at home. Pt lives with her brother who is MRDD and they have aide 24/7 from [facility] attempted to call them but no answer and unable to leave message as mail box full. Discussed home care and Rhys Oneill would like home care and not a SNF. Discussed choices and she has selected [facility]. Referral to Denise Read at [facility] and she accepts. Nurse updated. Informed Denise Read that sister Jamal Dong will have to do paper work.

## 2023-02-10 NOTE — PROGRESS NOTES
Discharge instructions given regarding home care, follow up, home medications, home care. Pt and sister voice understanding of same.     Discharged per wheelchair with all belongings in care of sister

## 2023-02-10 NOTE — PROGRESS NOTES
Occupational Therapy  [facility]  Daily Treatment Note  NAME: Avelino José  : 1960  MRN: [id number]    RN Ezekiel Menjivar reports patient is medically stable for therapy treatment this date. Chart reviewed prior to treatment and patient is agreeable for therapy. All lines intact and patient positioned comfortably at end of treatment. All patient needs addressed prior to ending therapy session. Pt currently functioning below baseline. Recommend daily inpatient skilled therapy at time of discharge to maximize long term outcomes and prevent re-admission. Please refer to AM-PAC score for current level of function. Date of Service: 2/10/2023    Discharge Recommendations:  Patient would benefit from continued therapy after discharge  OT Equipment Recommendations  Equipment Needed:  (CTA)      Patient Diagnosis(es): The primary encounter diagnosis was Paroxysmal supraventricular tachycardia ([facility] [address] A diagnosis of Acute congestive heart failure, unspecified heart failure type [facility]) was also pertinent to this visit. Assessment    Assessment: Pt needed max edu and encouragement to participate with tx and complete self care tasks. Pt with increased perseveration on eating breakfast and difficulties focusing on tasks during session. Pt was easily agitated but able to be calmed and re-directed. Pt continues to require 2 staff assist with all care and only able to complete sit to stand from EOB with bed elevated in laine stedy lift for repositioning only. Continue with OT POC for maximizing functional I and safety as able. Activity Tolerance: Patient limited by endurance; Patient limited by fatigue;Patient limited by pain;Treatment limited secondary to decreased cognition  Discharge Recommendations: Patient would benefit from continued therapy after discharge  Equipment Needed:  (CTA)      Plan   Occupational Therapy Plan  Times Per Week: 4-5x/week 1-2x/day as tolerated  Current Treatment Recommendations: Strengthening;Balance training;Functional mobility training; Endurance training; Safety education & training;Neuromuscular re-education;Patient/Caregiver education & training;Self-Care / ADL;Cognitive/Perceptual training;Equipment evaluation, education, & procurement; Coordination training     Restrictions  Restrictions/Precautions  Restrictions/Precautions: General Precautions; Fall Risk  Position Activity Restriction  Other position/activity restrictions: Up w/ assist, telemetry, 1800 mL fluid restriction, external urinary catheter, RUE IV, alarms, pt is MRDD    Subjective   Subjective  Subjective: Pt states \"I am so hungry! \"  Pain: Pt denies pain  Orientation  Overall Orientation Status: Within Functional Limits  Orientation Level: Disoriented to time;Disoriented to situation;Disoriented to person;Disoriented to place (pt alert to hospital but unable to name; pt was alert to her owb birthdate)  Pain: Pt merly pain and reports that headache from yesterday has resolved. Cognition  Overall Cognitive Status: Exceptions  Arousal/Alertness: Delayed responses to stimuli  Following Commands: Follows one step commands with repetition; Follows one step commands with increased time  Attention Span: Attends with cues to redirect; Difficulty attending to directions  Memory: Decreased short term memory  Safety Judgement: Decreased awareness of need for assistance;Decreased awareness of need for safety  Problem Solving: Assistance required to generate solutions;Assistance required to implement solutions;Assistance required to identify errors made;Assistance required to correct errors made;Decreased awareness of errors  Insights: Not aware of deficits  Initiation: Requires cues for all  Sequencing: Requires cues for all  Cognition Comment: Pt can agitate easily at times but is able to calm down with reassurance/soft spoken voice        Objective       Bed Mobility Training  Bed Mobility Training: Yes  Overall Level of Assistance: Maximum assistance;Assist X2;Additional time  Interventions: Verbal cues; Safety awareness training; Tactile cues (MAX cues/tactile assist for initiating movements/proper log rolling tech, hand placement on bed rail to assist, pacing, pursed lip breathing, use of BUE's to assist with upright positon/full scoot to EOB and place BLE's on floor and awareness/assist with lines to increase safety)  Rolling: Assist X2;Moderate assistance (rolling mult times in bed to change brief)  Supine to Sit: Maximum assistance;Assist X2  Sit to Supine: Maximum assistance;Assist X2 (assisted pt back to bed as she is not appropriate to be up in recliners or chair in room/too low)  Scooting: Maximum assistance;Assist X2    Balance  Sitting:  (CG to SBA)  Standing:  (MOD x2 in laine stedy)  Transfer Training  Transfer Training: Yes  Overall Level of Assistance: Moderate assistance;Assist X2 (sit to stands only in laine ernestody and bed was elevated; laine [employer] used for resositoning pt up in bed)  Interventions: Safety awareness training;Verbal cues; Visual cues; Tactile cues (MAX cues/tactile assist and demo for B hand placement on laine stedy handle, proper placement of BLE's on loft, safety features of life, nose over toes tech, pacing, pursed lip breathing, upright posture/weight shifting, controlled stand to sits and awareness/assist with lines to  increase safety/reduce falls)  Sit to Stand: Moderate assistance;Assist X2 (bed was elevated and completed x2)  Stand to Sit: Moderate assistance;Assist X2  Toilet Transfer:  (N/T and pt has ext cath)    Functional mob   Overall Level of Assistance: (N/T and not safe or appropriate; laine [employer] was used for repositioning pt)       ADL  Grooming: Min assist; Set up; pt was able to wash face with set up seated EOB and min assist for applying roll on deodorant only   UE Bathing: Setup; Moderate assistance (with sponge bath seated EOB)  LE Bathing: Setup;Dependent/Total (x2 staff supine in bed to wash yamini area)  LE Dressing: Dependent/Total;Maximum assistance  LE Dressing Skilled Clinical Factors: MAX assist for B socks supine in bed; x2 staff to doff/cecile brief in supine position  Toileting: Dependent/Total (ext cath; DEP to doff soiled cath/cecile clean one and all hygiene as well as doffing/donning clean brief)  Additional Comments: MAX cues/demo for sequencing with ADL tasks and slow and delayed processing as well as max edu/encouragement needed to complete; pt with increased perseveration on eating breakfast and difficulties focusing on ADL tasks. Safety Devices  Type of Devices: Gait belt;Call light within reach;Nurse notified; Patient at risk for falls; All fall risk precautions in place; Bed alarm in place; Left in bed (BLE's elevated/pillow under and pillow added to R side as well to increase midline position/reduce skin issues)     Patient Education  Education Given To: Patient  Education Provided: Role of Therapy;Transfer Training;Plan of Care; Fall Prevention Strategies; ADL Adaptive Strategies;Orientation; Energy Conservation  Education Provided Comments: call light use, proper bed mob tech, postural control and weight shifting, safety in function, recommendations for continued therapy/benefits of being up and participation with therapy  Education Method: Demonstration;Verbal  Barriers to Learning: Cognition  Education Outcome: Continued education needed    Goals  Short Term Goals  Time Frame for Short Term Goals: By discharge, pt to demo:  Short Term Goal 1: bed mobility tasks w/ SBA and Good safety with use of bedrails/bed controls as needed. Short Term Goal 2: ADL transfers and functional mobility tasks w/ CGA and Good safety with use of AD as needed. Short Term Goal 3: increased BUE strength by 1/2 grade to promote functional strength and ROM required for safety and IND with self care tasks.   Short Term Goal 4: UB ADLs to MinAx1 and LB ADLs to ModAx1 with Good safety and use of AE/AD as needed. Short Term Goal 5: toileting tasks to ModAx1 and Good safety with use of AD/grab bars/BSC as needed. Long Term Goals  Long Term Goal 1: Pt to demo increased standing tolerance to > 10 mins to promote functional activity tolerance and balance required to reduce the risk of falls in function. Long Term Goal 2: Caregivers to be IND with fall prevention strategies, EC/WS tech, compensatory ADL strategies, discharge/equipment recommendations, and a BUE HEP with use of handouts as needed. Patient Goals   Patient goals : To go home!        Therapy Time   Individual Concurrent Group Co-treatment   Time In [address] [place] [address]         Time Out 0939         Minutes [facility] [place], [place]

## 2023-02-10 NOTE — PROGRESS NOTES
[facility]  Progress Note    Omero Hudson  MRN: [id number]  Date: 2/9/2023  LOS: 5     Reason for F/U :   Possible urinary tract infection    Impression :   Concern for urinary tract infection  Frequent falls  New onset congestive heart failure  Paroxysmal SVT  Developmental disability  Diabetes mellitus  Hypertension    Recommendations: The patient continues on intravenous antimicrobial therapy with Rocephin  The urine culture with gram-negative rods isolated thus far  Clinically the patient is asymptomatic and is not clear if this is truly a UTI of this represents colonizer  We will continue to monitor her progress    Infection Control Recommendations:   Universal precautions    Discharge Planning:   Estimated Length of IV antimicrobials: To be determined  Patient will need Midline Catheter Insertion/ PICC line Insertion: No  Patient will need: Home IV , [place],  SNF,  LTAC: Undetermined  Patient willneed outpatient wound care: No    Medical Decision making / Summary of Stay:   Omero Hudson is a 60y.o.-year-old female who was initially admitted on 2/4/2023. The patient has known medical history of diabetes mellitus, hypertension, CVA, MRDD. She is not a great historian, most information was obtained from the patient's chart and the nursing staff. She reportedly lives in a \"group home\" which upon further questioning is actually a home where she and her brother reside and family have caregivers coming in to take care of her. Just prior to admission, she had fallen a Few times, the patient tells me she tripped over the rug. The sister also states that she had been confused for a couple of days and usually when this happens she has a urinary tract infection. She did not have any fevers. Patient was admitted, diagnosed with new onset congestive heart failure, paroxysmal supraventricular tachycardia.   Yesterday she was initiated on cefepime due to concern for possible urinary tract infection. Urinalysis with too numerous WBCs to count, positive for nitrates, moderate leuk esterase, many bacteria. We were consulted due to concern for urinary tract infection. Current evaluation:2023    /62   Pulse 82   Temp 98 °F (36.7 °C) (Temporal)   Resp 16   Ht 5' 11\" (1.803 m)   Wt 262 lb (118.8 kg)   SpO2 94%   BMI 36.54 kg/m²     Temperature Range: Temp: 98 °F (36.7 °C) Temp  Av.6 °F (36.4 °C)  Min: 97 °F (36.1 °C)  Max: 98.3 °F (36.8 °C)  The patient is seen and evaluated at bedside she is awake lying in bed in no acute distress. No subjective fever or chills. No abdominal pain nausea vomiting or diarrhea. No dysuria or frequency. Review of Systems   Constitutional: Negative. Respiratory: Negative. Cardiovascular: Negative. Gastrointestinal: Negative. Genitourinary: Negative. Musculoskeletal: Negative. Skin: Negative. Neurological: Negative. Psychiatric/Behavioral: Negative. Physical Examination :     Physical Exam  Constitutional:       Appearance: She is well-developed. She is obese. HENT:      Head: Normocephalic and atraumatic. Cardiovascular:      Rate and Rhythm: Regular rhythm. Heart sounds: Normal heart sounds. Pulmonary:      Effort: Pulmonary effort is normal.      Breath sounds: Normal breath sounds. Abdominal:      General: Bowel sounds are normal.      Palpations: Abdomen is soft. Musculoskeletal:      Cervical back: Normal range of motion and neck supple. Skin:     General: Skin is warm and dry. Neurological:      Mental Status: She is alert and oriented to person, place, and time.        Laboratory data:   I have independently reviewed the followinglabs:  CBC with Differential:   Recent Labs     23  0335 23  0320   WBC 8.2 8.9   HGB 12.0 12.0   HCT 38.3 38.4    154   LYMPHOPCT 19* 24   MONOPCT 10 8     BMP:   Recent Labs     23  0335 23  032   * 132*   K 3.6* 3.4*   CL 96* 96*   CO2 23 26   BUN 25* 24*   CREATININE 0.82 0.91*   MG  --  1.7     Hepatic Function Panel: No results for input(s): PROT, LABALBU, BILIDIR, IBILI, BILITOT, ALKPHOS, ALT, AST in the last 72 hours. No results found for: PROCAL  No results found for: CRP  No results found for: SEDRATE      No results found for: DDIMER  No results found for: FERRITIN  No results found for: LDH  No results found for: FIBRINOGEN    No results found for requested labs within last 30 days. No results found for: COVID19    No results for input(s): VANCOTROUGH in the last 72 hours. Imaging Studies:   No new imaging    Cultures:     Culture, Urine [[id number]] (Abnormal) Collected: 02/07/23 1824   Order Status: Completed Specimen: Urine, straight catheter Updated: 02/09/23 0803    Specimen Description . URINE,STRAIGHT CATHETER    Culture ESCHERICHIA COLI >[id number] CFU/ML Abnormal        Medications:      insulin lispro  0-16 Units SubCUTAneous TID WC    insulin lispro  0-4 Units SubCUTAneous Nightly    cefTRIAXone (ROCEPHIN) IV  1,000 mg IntraVENous Q24H    furosemide  40 mg Oral Daily    apixaban  5 mg Oral BID    atorvastatin  20 mg Oral Nightly    metoprolol succinate  50 mg Oral Daily    QUEtiapine  25 mg Oral Nightly    sertraline  25 mg Oral Daily    spironolactone  25 mg Oral Daily    fenofibrate  54 mg Oral Daily    glipiZIDE  2.5 mg Oral QAM AC    omega-3 acid ethyl esters  1 g Oral BID    adenosine  6 mg IntraVENous Once    sodium chloride flush  5-40 mL IntraVENous 2 times per day       Electronically signed by Rossi Vazquez MD on 2/9/2023 at 7:48 PM      [facility]  Rossi Vazquez MD  [employer] messaging  OFFICE: [phone]    Thank you for allowing us to participate in the care of this patient. Please call with questions.     This note is created with the assistance of a speech recognition program.  While intending to generate a document that actually reflects the content of the visit, the document can still have some errors including those of syntax and sound a like substitutions which may escape proof reading. In such instances, actual meaning can be extrapolated by contextual diversion.

## 2023-02-10 NOTE — PROGRESS NOTES
Physical Therapy  Facility/Department: [facility]  Daily Treatment Note  NAME: Lilia Escamilla  : 1960  MRN: [id number]    Date of Service: 2/10/2023    Discharge Recommendations:  Pt currently functioning below baseline. Recommend daily inpatient skilled therapy at time of discharge to maximize long term outcomes and prevent re-admission. Please refer to AM-PAC score for current level of function. Patient would benefit from continued therapy after discharge        Patient Diagnosis(es): The primary encounter diagnosis was Paroxysmal supraventricular tachycardia ([facility] [address] A diagnosis of Acute congestive heart failure, unspecified heart failure type [facility]) was also pertinent to this visit. Assessment   Assessment: Pt performs bed mobility with MOD-MAX A x2. Pt seated at EoB ~15 mins w/stable vitals. Writer uses laine stedy to practice functional STS strengthening and place pt closer to head of bed for ease of sit>supine transfer. Pt demos impatience and impulsivity when seated in laine stedy, reaching back saying, \"I want to sit down\". Pt becomes agitated with safety corrections but generally tolerates tx. Pt bolstered w/pillow on R side of upper body when returned to bed to prevent lateral lean. Pt would benefit from continued skilled PT to improve independence. Activity Tolerance: Patient limited by endurance; Patient limited by fatigue;Patient limited by pain     Plan    Physcial Therapy Plan  General Plan: 5-7 times per week  Current Treatment Recommendations: Strengthening;Balance training;Functional mobility training;Transfer training;Gait training; Endurance training     Restrictions  Restrictions/Precautions  Restrictions/Precautions: General Precautions, Fall Risk  Required Braces or Orthoses?: No  Position Activity Restriction  Other position/activity restrictions: Up w/ assist, telemetry, 1800 mL fluid restriction, external urinary catheter, RUE IV     Subjective    Subjective  Subjective: Pt supine in bed upon arrival. HELLEN Centeno reports that pt is medically stable and appropriate for therapy this date. Pt agreeable to therapy. Pain: Pt merly pain and reports that headache from yesterday has resolved. Orientation  Overall Orientation Status: Within Functional Limits  Orientation Level: Oriented X4  Cognition  Overall Cognitive Status: Exceptions  Arousal/Alertness: Delayed responses to stimuli  Following Commands: Follows one step commands with repetition; Follows one step commands with increased time  Attention Span: Attends with cues to redirect  Memory: Appears intact  Safety Judgement: Decreased awareness of need for assistance;Decreased awareness of need for safety  Problem Solving: Assistance required to generate solutions;Assistance required to implement solutions;Assistance required to identify errors made;Assistance required to correct errors made;Decreased awareness of errors  Insights: Decreased awareness of deficits  Initiation: Requires cues for some  Sequencing: Requires cues for some  Cognition Comment: Pt becomes impatient quickly when rolling to change brief calling out, \"I'm hungry! \" Also, when seated in laine ste, pt attempts to reach back to bed saying, \"I want to sit down! \"     Objective      Bed Mobility Training  Bed Mobility Training: Yes  Overall Level of Assistance: Moderate assistance;Maximum assistance;Assist X2;Additional time (MOD A x 2 for sit>supine, MAX A x 2 for supine>sit.)  Interventions: Safety awareness training;Verbal cues; Tactile cues (Max VCs to roll onto side by reaching for rail for assist and push off of arm to come into seated position. Also, to use BUEs to scoot toward EoB and place feet on floor for safety and stability.)  Rolling: Moderate assistance; Additional time (Pt demos proper use of hand rails and able to help w/rolling but still requires MOD A.)  Supine to Sit: Maximum assistance;Assist X2  Sit to Supine: Maximum assistance;Assist X2  Scooting:  Moderate assistance;Assist X2  Balance  Sitting: Intact (SBA seated at EoB)  Standing: With support (laine stedy)  Transfer Training  Transfer Training: No (Pt unsafe to transfer to recliner this date. Inconsistently follows commands, impulsive, requiring MAX A x 2 to  laine stedy.)  Gait Training  Gait Training: No (Pt unsafe to ambulate this date.)  Gait  Assistive Device: Gait belt; Other (comment) Cam [employer] stedy)     PT Exercises  Circulation/Endurance Exercises: Supine ankle pumps x10 reps  Pressure Relief Exercises: Rolling L & R x2 reps ea. to change brief/purwick  Functional Mobility Circuit Training: STS x2 in laine stedy  Static Sitting Balance Exercises: Pt ambrocio Trejo, seated at EoB ~15 mins. Breathing Techniques: Pursed lip breathing     Safety Devices  Type of Devices: Gait belt;Call light within reach;Nurse notified; Patient at risk for falls; All fall risk precautions in place; Bed alarm in place; Left in bed       Goals  Short Term Goals  Time Frame for Short Term Goals: 12 treatments  Short Term Goal 1: Independent bed mobility/transfers  Short Term Goal 2: SBA ambulation w/ ' x 1  Short Term Goal 3: Good balance/posture  Short Term Goal 4: 5/5 B LE's  Short Term Goal 5: Tolerate 30 min ther act  Patient Goals   Patient Goals : Per family, feel better    Education  Patient Education  Education Given To: Patient  Education Provided: Role of Therapy;Plan of Care;Orientation;Equipment;Precautions  Education Provided Comments: Importance of mobility throughout admission, PT POC, proper use of laine stedy.   Education Method: Demonstration;Verbal  Barriers to Learning: Cognition  Education Outcome: Continued education needed    AM-PAC Scores  AM-PAC Inpatient Mobility Raw Score: 9  AM-PAC Inpatient T-Scale Score: 30.55  Mobility Inpatient CMS 0-100% Score: 81.38  Mobility Inpatient CMS G-Code Modifier: CM    Therapy Time   Individual Concurrent Group Co-treatment   Time In 0858         Time Out 4006 Minutes 39               Co-treatment with OT warranted secondary to decreased safety and independence requiring 2 skilled therapy professionals to address individual discipline's goals. PT addressing bed mobility, weight shifting prior to transfers, and postural control in sitting. Treatment & documentation completed by Reema Benavides, Student Physical Therapist Assistant  co-signed by Akua Centeno PTA   I am the clinical instructor and have supervised the student in provision of therapy services. I have reviewed the clinical documentation and am responsible for the care provided under the therapy plan of care.

## 2023-02-15 NOTE — PROGRESS NOTES
Physician Progress Note      PATIENT:               Lowry Sicard  CSN #:                  [id number]  :                       1960  ADMIT DATE:       2023 6:38 PM  [address] DATE:        2/10/2023 6:05 PM  RESPONDING  PROVIDER #:        Faiza Perez MD          QUERY TEXT:    Patient admitted new onset diastolic HF. If possible, please document in the   progress notes if you were evaluating and/or treating any of the following: The medical record reflects the following:  Risk Factors: new onset diastolic heart failure , Elevated troponins  Clinical Indicators:  H&P:  states troponin likely type II MI.  DS:   states elevated troponins. Troponins 11 -> 49 . EKG showed left anterior   fascicular block. Cardiology consultant documents slightly elevated HS   troponin likely type 2 MI. Treatment: cardiology consulted. Lasix 20 mg IV x1, Lasix 40 mg IV daily->   switched to po daily on DC  Options provided:  -- Type 2 MI  -- Type 2 MI ruled out and elevated troponins only ruled in: Type 2 MI ruled   out and elevated troponins only ruled in.  -- Other - I will add my own diagnosis  -- Disagree - Not applicable / Not valid  -- Disagree - Clinically unable to determine / Unknown  -- Refer to Clinical Documentation Reviewer    PROVIDER RESPONSE TEXT:    This patient had a Type 2 MI.     Query created by: Stephie Sloan on 2/15/2023 7:52 AM      Electronically signed by:  Faiza Perez MD 2/15/2023 8:43 AM

## 2023-02-19 ENCOUNTER — HOSPITAL ENCOUNTER (INPATIENT)
Age: 63
LOS: 2 days | Discharge: HOME | DRG: 641 | End: 2023-02-21
Attending: STUDENT IN AN ORGANIZED HEALTH CARE EDUCATION/TRAINING PROGRAM | Admitting: FAMILY MEDICINE
Payer: MEDICARE

## 2023-02-19 ENCOUNTER — APPOINTMENT (OUTPATIENT)
Dept: GENERAL RADIOLOGY | Age: 63
DRG: 641 | End: 2023-02-19
Payer: MEDICARE

## 2023-02-19 DIAGNOSIS — R07.9 CHEST PAIN, UNSPECIFIED TYPE: ICD-10-CM

## 2023-02-19 DIAGNOSIS — J06.9 ACUTE UPPER RESPIRATORY INFECTION: ICD-10-CM

## 2023-02-19 DIAGNOSIS — E87.6 HYPOKALEMIA: Primary | ICD-10-CM

## 2023-02-19 DIAGNOSIS — I50.9 CONGESTIVE HEART FAILURE, UNSPECIFIED HF CHRONICITY, UNSPECIFIED HEART FAILURE TYPE (HCC): ICD-10-CM

## 2023-02-19 LAB
ABSOLUTE EOS #: 0.14 K/UL (ref 0–0.44)
ABSOLUTE IMMATURE GRANULOCYTE: 0.04 K/UL (ref 0–0.3)
ABSOLUTE LYMPH #: 1.7 K/UL (ref 1.1–3.7)
ABSOLUTE MONO #: 0.66 K/UL (ref 0.1–1.2)
ANION GAP SERPL CALCULATED.3IONS-SCNC: 13 MMOL/L (ref 9–17)
BASOPHILS # BLD: 0 % (ref 0–2)
BASOPHILS ABSOLUTE: 0.03 K/UL (ref 0–0.2)
BNP SERPL-MCNC: 495 PG/ML
BUN SERPL-MCNC: 22 MG/DL (ref 8–23)
BUN/CREAT BLD: 24 (ref 9–20)
CALCIUM SERPL-MCNC: 9.2 MG/DL (ref 8.6–10.4)
CHLORIDE SERPL-SCNC: 95 MMOL/L (ref 98–107)
CO2 SERPL-SCNC: 27 MMOL/L (ref 20–31)
CREAT SERPL-MCNC: 0.93 MG/DL (ref 0.5–0.9)
EOSINOPHILS RELATIVE PERCENT: 2 % (ref 1–4)
GFR SERPL CREATININE-BSD FRML MDRD: >60 ML/MIN/1.73M2
GLUCOSE SERPL-MCNC: 147 MG/DL (ref 70–99)
HCT VFR BLD AUTO: 38.4 % (ref 36.3–47.1)
HGB BLD-MCNC: 12 G/DL (ref 11.9–15.1)
IMMATURE GRANULOCYTES: 1 %
LYMPHOCYTES # BLD: 22 % (ref 24–43)
MAGNESIUM SERPL-MCNC: 1.3 MG/DL (ref 1.6–2.6)
MCH RBC QN AUTO: 27.1 PG (ref 25.2–33.5)
MCHC RBC AUTO-ENTMCNC: 31.3 G/DL (ref 28.4–34.8)
MCV RBC AUTO: 86.9 FL (ref 82.6–102.9)
MONOCYTES # BLD: 9 % (ref 3–12)
NRBC AUTOMATED: 0 PER 100 WBC
PDW BLD-RTO: 14.7 % (ref 11.8–14.4)
PLATELET # BLD AUTO: 185 K/UL (ref 138–453)
PMV BLD AUTO: 10.2 FL (ref 8.1–13.5)
POTASSIUM SERPL-SCNC: 2.8 MMOL/L (ref 3.7–5.3)
RBC # BLD: 4.42 M/UL (ref 3.95–5.11)
RBC # BLD: ABNORMAL 10*6/UL
SARS-COV-2 RDRP RESP QL NAA+PROBE: NOT DETECTED
SEG NEUTROPHILS: 66 % (ref 36–65)
SEGMENTED NEUTROPHILS ABSOLUTE COUNT: 5.16 K/UL (ref 1.5–8.1)
SODIUM SERPL-SCNC: 135 MMOL/L (ref 135–144)
SPECIMEN DESCRIPTION: NORMAL
TROPONIN I SERPL DL<=0.01 NG/ML-MCNC: 15 NG/L (ref 0–14)
WBC # BLD AUTO: 7.7 K/UL (ref 3.5–11.3)

## 2023-02-19 PROCEDURE — 96365 THER/PROPH/DIAG IV INF INIT: CPT

## 2023-02-19 PROCEDURE — 96367 TX/PROPH/DG ADDL SEQ IV INF: CPT

## 2023-02-19 PROCEDURE — 1200000000 HC SEMI PRIVATE

## 2023-02-19 PROCEDURE — 87635 SARS-COV-2 COVID-19 AMP PRB: CPT

## 2023-02-19 PROCEDURE — 99285 EMERGENCY DEPT VISIT HI MDM: CPT

## 2023-02-19 PROCEDURE — 71045 X-RAY EXAM CHEST 1 VIEW: CPT

## 2023-02-19 PROCEDURE — 96366 THER/PROPH/DIAG IV INF ADDON: CPT

## 2023-02-19 PROCEDURE — 80048 BASIC METABOLIC PNL TOTAL CA: CPT

## 2023-02-19 PROCEDURE — 83880 ASSAY OF NATRIURETIC PEPTIDE: CPT

## 2023-02-19 PROCEDURE — 83735 ASSAY OF MAGNESIUM: CPT

## 2023-02-19 PROCEDURE — 85025 COMPLETE CBC W/AUTO DIFF WBC: CPT

## 2023-02-19 PROCEDURE — 6360000002 HC RX W HCPCS: Performed by: STUDENT IN AN ORGANIZED HEALTH CARE EDUCATION/TRAINING PROGRAM

## 2023-02-19 PROCEDURE — 93005 ELECTROCARDIOGRAM TRACING: CPT | Performed by: STUDENT IN AN ORGANIZED HEALTH CARE EDUCATION/TRAINING PROGRAM

## 2023-02-19 PROCEDURE — 6370000000 HC RX 637 (ALT 250 FOR IP): Performed by: STUDENT IN AN ORGANIZED HEALTH CARE EDUCATION/TRAINING PROGRAM

## 2023-02-19 PROCEDURE — 84484 ASSAY OF TROPONIN QUANT: CPT

## 2023-02-19 RX ORDER — MAGNESIUM SULFATE IN WATER 40 MG/ML
2000 INJECTION, SOLUTION INTRAVENOUS ONCE
Status: COMPLETED | OUTPATIENT
Start: 2023-02-19 | End: 2023-02-19

## 2023-02-19 RX ORDER — DEXTROSE MONOHYDRATE 100 MG/ML
INJECTION, SOLUTION INTRAVENOUS CONTINUOUS PRN
Status: DISCONTINUED | OUTPATIENT
Start: 2023-02-19 | End: 2023-02-21 | Stop reason: HOSPADM

## 2023-02-19 RX ORDER — MAGNESIUM SULFATE 1 G/100ML
1000 INJECTION INTRAVENOUS PRN
Status: DISCONTINUED | OUTPATIENT
Start: 2023-02-19 | End: 2023-02-21 | Stop reason: HOSPADM

## 2023-02-19 RX ORDER — INSULIN LISPRO 100 [IU]/ML
0-4 INJECTION, SOLUTION INTRAVENOUS; SUBCUTANEOUS NIGHTLY
Status: DISCONTINUED | OUTPATIENT
Start: 2023-02-19 | End: 2023-02-20

## 2023-02-19 RX ORDER — SODIUM CHLORIDE AND POTASSIUM CHLORIDE 300; 900 MG/100ML; MG/100ML
INJECTION, SOLUTION INTRAVENOUS CONTINUOUS
Status: DISPENSED | OUTPATIENT
Start: 2023-02-19 | End: 2023-02-20

## 2023-02-19 RX ORDER — CODEINE PHOSPHATE AND GUAIFENESIN 10; 100 MG/5ML; MG/5ML
5 SOLUTION ORAL ONCE
Status: COMPLETED | OUTPATIENT
Start: 2023-02-19 | End: 2023-02-19

## 2023-02-19 RX ORDER — ACETAMINOPHEN 650 MG/1
650 SUPPOSITORY RECTAL EVERY 6 HOURS PRN
Status: DISCONTINUED | OUTPATIENT
Start: 2023-02-19 | End: 2023-02-21 | Stop reason: HOSPADM

## 2023-02-19 RX ORDER — ONDANSETRON 2 MG/ML
4 INJECTION INTRAMUSCULAR; INTRAVENOUS EVERY 6 HOURS PRN
Status: DISCONTINUED | OUTPATIENT
Start: 2023-02-19 | End: 2023-02-21 | Stop reason: HOSPADM

## 2023-02-19 RX ORDER — POTASSIUM CHLORIDE 20 MEQ/1
40 TABLET, EXTENDED RELEASE ORAL PRN
Status: DISCONTINUED | OUTPATIENT
Start: 2023-02-19 | End: 2023-02-21 | Stop reason: HOSPADM

## 2023-02-19 RX ORDER — SODIUM CHLORIDE 9 MG/ML
INJECTION, SOLUTION INTRAVENOUS PRN
Status: DISCONTINUED | OUTPATIENT
Start: 2023-02-19 | End: 2023-02-21 | Stop reason: HOSPADM

## 2023-02-19 RX ORDER — SODIUM CHLORIDE 0.9 % (FLUSH) 0.9 %
5-40 SYRINGE (ML) INJECTION EVERY 12 HOURS SCHEDULED
Status: DISCONTINUED | OUTPATIENT
Start: 2023-02-19 | End: 2023-02-21 | Stop reason: HOSPADM

## 2023-02-19 RX ORDER — POLYETHYLENE GLYCOL 3350 17 G/17G
17 POWDER, FOR SOLUTION ORAL DAILY PRN
Status: DISCONTINUED | OUTPATIENT
Start: 2023-02-19 | End: 2023-02-21 | Stop reason: HOSPADM

## 2023-02-19 RX ORDER — SODIUM CHLORIDE 0.9 % (FLUSH) 0.9 %
10 SYRINGE (ML) INJECTION PRN
Status: DISCONTINUED | OUTPATIENT
Start: 2023-02-19 | End: 2023-02-21 | Stop reason: HOSPADM

## 2023-02-19 RX ORDER — POTASSIUM CHLORIDE 7.45 MG/ML
10 INJECTION INTRAVENOUS PRN
Status: DISCONTINUED | OUTPATIENT
Start: 2023-02-19 | End: 2023-02-21 | Stop reason: HOSPADM

## 2023-02-19 RX ORDER — ONDANSETRON 4 MG/1
4 TABLET, ORALLY DISINTEGRATING ORAL EVERY 8 HOURS PRN
Status: DISCONTINUED | OUTPATIENT
Start: 2023-02-19 | End: 2023-02-21 | Stop reason: HOSPADM

## 2023-02-19 RX ORDER — INSULIN LISPRO 100 [IU]/ML
0-4 INJECTION, SOLUTION INTRAVENOUS; SUBCUTANEOUS
Status: DISCONTINUED | OUTPATIENT
Start: 2023-02-20 | End: 2023-02-20

## 2023-02-19 RX ORDER — ACETAMINOPHEN 325 MG/1
650 TABLET ORAL EVERY 6 HOURS PRN
Status: DISCONTINUED | OUTPATIENT
Start: 2023-02-19 | End: 2023-02-21 | Stop reason: HOSPADM

## 2023-02-19 RX ADMIN — POTASSIUM CHLORIDE AND SODIUM CHLORIDE: 900; 300 INJECTION, SOLUTION INTRAVENOUS at 21:04

## 2023-02-19 RX ADMIN — POTASSIUM BICARBONATE 40 MEQ: 782 TABLET, EFFERVESCENT ORAL at 21:03

## 2023-02-19 RX ADMIN — MAGNESIUM SULFATE HEPTAHYDRATE 2000 MG: 40 INJECTION, SOLUTION INTRAVENOUS at 21:05

## 2023-02-19 RX ADMIN — GUAIFENESIN AND CODEINE PHOSPHATE 5 ML: 10; 100 LIQUID ORAL at 19:55

## 2023-02-20 LAB
ABSOLUTE EOS #: 0.11 K/UL (ref 0–0.44)
ABSOLUTE IMMATURE GRANULOCYTE: 0.03 K/UL (ref 0–0.3)
ABSOLUTE LYMPH #: 1.76 K/UL (ref 1.1–3.7)
ABSOLUTE MONO #: 0.5 K/UL (ref 0.1–1.2)
ANION GAP SERPL CALCULATED.3IONS-SCNC: 10 MMOL/L (ref 9–17)
BASOPHILS # BLD: 1 % (ref 0–2)
BASOPHILS ABSOLUTE: 0.04 K/UL (ref 0–0.2)
BUN SERPL-MCNC: 18 MG/DL (ref 8–23)
BUN/CREAT BLD: 22 (ref 9–20)
CALCIUM SERPL-MCNC: 8.6 MG/DL (ref 8.6–10.4)
CHLORIDE SERPL-SCNC: 102 MMOL/L (ref 98–107)
CO2 SERPL-SCNC: 25 MMOL/L (ref 20–31)
CREAT SERPL-MCNC: 0.81 MG/DL (ref 0.5–0.9)
EKG ATRIAL RATE: 112 BPM
EKG P AXIS: 74 DEGREES
EKG P-R INTERVAL: 138 MS
EKG Q-T INTERVAL: 376 MS
EKG QRS DURATION: 80 MS
EKG QTC CALCULATION (BAZETT): 513 MS
EKG R AXIS: -71 DEGREES
EKG T AXIS: 79 DEGREES
EKG VENTRICULAR RATE: 112 BPM
EOSINOPHILS RELATIVE PERCENT: 2 % (ref 1–4)
EST. AVERAGE GLUCOSE BLD GHB EST-MCNC: 157 MG/DL
GFR SERPL CREATININE-BSD FRML MDRD: >60 ML/MIN/1.73M2
GLUCOSE BLD-MCNC: 148 MG/DL (ref 65–105)
GLUCOSE BLD-MCNC: 165 MG/DL (ref 65–105)
GLUCOSE BLD-MCNC: 274 MG/DL (ref 65–105)
GLUCOSE BLD-MCNC: 288 MG/DL (ref 65–105)
GLUCOSE SERPL-MCNC: 119 MG/DL (ref 70–99)
HBA1C MFR BLD: 7.1 % (ref 4–6)
HCT VFR BLD AUTO: 35.6 % (ref 36.3–47.1)
HGB BLD-MCNC: 10.9 G/DL (ref 11.9–15.1)
IMMATURE GRANULOCYTES: 1 %
LYMPHOCYTES # BLD: 30 % (ref 24–43)
MAGNESIUM SERPL-MCNC: 1.8 MG/DL (ref 1.6–2.6)
MCH RBC QN AUTO: 26.9 PG (ref 25.2–33.5)
MCHC RBC AUTO-ENTMCNC: 30.6 G/DL (ref 28.4–34.8)
MCV RBC AUTO: 87.9 FL (ref 82.6–102.9)
MONOCYTES # BLD: 9 % (ref 3–12)
NRBC AUTOMATED: 0 PER 100 WBC
PDW BLD-RTO: 14.6 % (ref 11.8–14.4)
PLATELET # BLD AUTO: 167 K/UL (ref 138–453)
PMV BLD AUTO: 10 FL (ref 8.1–13.5)
POTASSIUM SERPL-SCNC: 3.7 MMOL/L (ref 3.7–5.3)
RBC # BLD: 4.05 M/UL (ref 3.95–5.11)
RBC # BLD: ABNORMAL 10*6/UL
SEG NEUTROPHILS: 57 % (ref 36–65)
SEGMENTED NEUTROPHILS ABSOLUTE COUNT: 3.37 K/UL (ref 1.5–8.1)
SODIUM SERPL-SCNC: 137 MMOL/L (ref 135–144)
TROPONIN I SERPL DL<=0.01 NG/ML-MCNC: 15 NG/L (ref 0–14)
WBC # BLD AUTO: 5.8 K/UL (ref 3.5–11.3)

## 2023-02-20 PROCEDURE — 6360000002 HC RX W HCPCS: Performed by: FAMILY MEDICINE

## 2023-02-20 PROCEDURE — 97535 SELF CARE MNGMENT TRAINING: CPT

## 2023-02-20 PROCEDURE — 83036 HEMOGLOBIN GLYCOSYLATED A1C: CPT

## 2023-02-20 PROCEDURE — 97167 OT EVAL HIGH COMPLEX 60 MIN: CPT

## 2023-02-20 PROCEDURE — 2580000003 HC RX 258: Performed by: NURSE PRACTITIONER

## 2023-02-20 PROCEDURE — 97163 PT EVAL HIGH COMPLEX 45 MIN: CPT

## 2023-02-20 PROCEDURE — 6370000000 HC RX 637 (ALT 250 FOR IP): Performed by: FAMILY MEDICINE

## 2023-02-20 PROCEDURE — 85025 COMPLETE CBC W/AUTO DIFF WBC: CPT

## 2023-02-20 PROCEDURE — 6370000000 HC RX 637 (ALT 250 FOR IP): Performed by: NURSE PRACTITIONER

## 2023-02-20 PROCEDURE — 84484 ASSAY OF TROPONIN QUANT: CPT

## 2023-02-20 PROCEDURE — 96366 THER/PROPH/DIAG IV INF ADDON: CPT

## 2023-02-20 PROCEDURE — 36415 COLL VENOUS BLD VENIPUNCTURE: CPT

## 2023-02-20 PROCEDURE — 1200000000 HC SEMI PRIVATE

## 2023-02-20 PROCEDURE — 97530 THERAPEUTIC ACTIVITIES: CPT

## 2023-02-20 PROCEDURE — 82947 ASSAY GLUCOSE BLOOD QUANT: CPT

## 2023-02-20 PROCEDURE — 83735 ASSAY OF MAGNESIUM: CPT

## 2023-02-20 PROCEDURE — 80048 BASIC METABOLIC PNL TOTAL CA: CPT

## 2023-02-20 RX ORDER — TROSPIUM CHLORIDE 20 MG/1
20 TABLET, FILM COATED ORAL NIGHTLY
Status: DISCONTINUED | OUTPATIENT
Start: 2023-02-20 | End: 2023-02-21 | Stop reason: HOSPADM

## 2023-02-20 RX ORDER — INSULIN LISPRO 100 [IU]/ML
0-16 INJECTION, SOLUTION INTRAVENOUS; SUBCUTANEOUS
Status: DISCONTINUED | OUTPATIENT
Start: 2023-02-20 | End: 2023-02-21 | Stop reason: HOSPADM

## 2023-02-20 RX ORDER — HYDROXYZINE HYDROCHLORIDE 25 MG/1
25 TABLET, FILM COATED ORAL NIGHTLY PRN
Status: DISCONTINUED | OUTPATIENT
Start: 2023-02-20 | End: 2023-02-21 | Stop reason: HOSPADM

## 2023-02-20 RX ORDER — GLIPIZIDE 5 MG/1
5 TABLET ORAL
Status: DISCONTINUED | OUTPATIENT
Start: 2023-02-21 | End: 2023-02-21 | Stop reason: HOSPADM

## 2023-02-20 RX ORDER — DEXTROSE MONOHYDRATE 100 MG/ML
INJECTION, SOLUTION INTRAVENOUS CONTINUOUS PRN
Status: DISCONTINUED | OUTPATIENT
Start: 2023-02-20 | End: 2023-02-21 | Stop reason: HOSPADM

## 2023-02-20 RX ORDER — METOPROLOL SUCCINATE 50 MG/1
50 TABLET, EXTENDED RELEASE ORAL DAILY
Status: DISCONTINUED | OUTPATIENT
Start: 2023-02-20 | End: 2023-02-21 | Stop reason: HOSPADM

## 2023-02-20 RX ORDER — FENOFIBRATE 160 MG/1
160 TABLET ORAL DAILY
Status: DISCONTINUED | OUTPATIENT
Start: 2023-02-20 | End: 2023-02-21 | Stop reason: HOSPADM

## 2023-02-20 RX ORDER — SERTRALINE HYDROCHLORIDE 25 MG/1
25 TABLET, FILM COATED ORAL DAILY
Status: DISCONTINUED | OUTPATIENT
Start: 2023-02-20 | End: 2023-02-21 | Stop reason: HOSPADM

## 2023-02-20 RX ORDER — LANOLIN ALCOHOL/MO/W.PET/CERES
1000 CREAM (GRAM) TOPICAL DAILY
Status: DISCONTINUED | OUTPATIENT
Start: 2023-02-20 | End: 2023-02-21 | Stop reason: HOSPADM

## 2023-02-20 RX ORDER — FUROSEMIDE 10 MG/ML
20 INJECTION INTRAMUSCULAR; INTRAVENOUS 2 TIMES DAILY
Status: DISCONTINUED | OUTPATIENT
Start: 2023-02-20 | End: 2023-02-21 | Stop reason: HOSPADM

## 2023-02-20 RX ORDER — ATORVASTATIN CALCIUM 20 MG/1
20 TABLET, FILM COATED ORAL NIGHTLY
Status: DISCONTINUED | OUTPATIENT
Start: 2023-02-20 | End: 2023-02-21 | Stop reason: HOSPADM

## 2023-02-20 RX ORDER — QUETIAPINE FUMARATE 25 MG/1
25 TABLET, FILM COATED ORAL EVERY EVENING
Status: DISCONTINUED | OUTPATIENT
Start: 2023-02-20 | End: 2023-02-21 | Stop reason: HOSPADM

## 2023-02-20 RX ORDER — METHENAMINE HIPPURATE 1000 MG/1
1 TABLET ORAL 2 TIMES DAILY WITH MEALS
Status: DISCONTINUED | OUTPATIENT
Start: 2023-02-20 | End: 2023-02-21 | Stop reason: HOSPADM

## 2023-02-20 RX ORDER — SPIRONOLACTONE 25 MG/1
25 TABLET ORAL DAILY
Status: DISCONTINUED | OUTPATIENT
Start: 2023-02-20 | End: 2023-02-21 | Stop reason: HOSPADM

## 2023-02-20 RX ORDER — INSULIN LISPRO 100 [IU]/ML
0-4 INJECTION, SOLUTION INTRAVENOUS; SUBCUTANEOUS NIGHTLY
Status: DISCONTINUED | OUTPATIENT
Start: 2023-02-20 | End: 2023-02-21 | Stop reason: HOSPADM

## 2023-02-20 RX ADMIN — APIXABAN 5 MG: 5 TABLET, FILM COATED ORAL at 11:59

## 2023-02-20 RX ADMIN — FENOFIBRATE 160 MG: 160 TABLET ORAL at 11:59

## 2023-02-20 RX ADMIN — METFORMIN HYDROCHLORIDE 500 MG: 500 TABLET ORAL at 18:10

## 2023-02-20 RX ADMIN — SERTRALINE 25 MG: 25 TABLET, FILM COATED ORAL at 11:59

## 2023-02-20 RX ADMIN — FUROSEMIDE 20 MG: 10 INJECTION, SOLUTION INTRAMUSCULAR; INTRAVENOUS at 18:10

## 2023-02-20 RX ADMIN — SODIUM CHLORIDE, PRESERVATIVE FREE 10 ML: 5 INJECTION INTRAVENOUS at 18:12

## 2023-02-20 RX ADMIN — APIXABAN 5 MG: 5 TABLET, FILM COATED ORAL at 20:44

## 2023-02-20 RX ADMIN — ACETAMINOPHEN 650 MG: 325 TABLET ORAL at 00:40

## 2023-02-20 RX ADMIN — CYANOCOBALAMIN TAB 1000 MCG 1000 MCG: 1000 TAB at 11:59

## 2023-02-20 RX ADMIN — METFORMIN HYDROCHLORIDE 500 MG: 500 TABLET ORAL at 11:59

## 2023-02-20 RX ADMIN — ATORVASTATIN CALCIUM 20 MG: 20 TABLET, FILM COATED ORAL at 20:48

## 2023-02-20 RX ADMIN — FUROSEMIDE 20 MG: 10 INJECTION, SOLUTION INTRAMUSCULAR; INTRAVENOUS at 11:59

## 2023-02-20 RX ADMIN — SPIRONOLACTONE 25 MG: 25 TABLET ORAL at 11:59

## 2023-02-20 RX ADMIN — SODIUM CHLORIDE, PRESERVATIVE FREE 10 ML: 5 INJECTION INTRAVENOUS at 20:46

## 2023-02-20 RX ADMIN — ACETAMINOPHEN 650 MG: 325 TABLET ORAL at 20:44

## 2023-02-20 RX ADMIN — METOPROLOL SUCCINATE 50 MG: 50 TABLET, EXTENDED RELEASE ORAL at 11:59

## 2023-02-20 RX ADMIN — ACETAMINOPHEN 650 MG: 325 TABLET ORAL at 08:47

## 2023-02-20 RX ADMIN — TROSPIUM CHLORIDE 20 MG: 20 TABLET, FILM COATED ORAL at 20:44

## 2023-02-20 RX ADMIN — INSULIN LISPRO 8 UNITS: 100 INJECTION, SOLUTION INTRAVENOUS; SUBCUTANEOUS at 11:57

## 2023-02-20 RX ADMIN — QUETIAPINE FUMARATE 25 MG: 25 TABLET ORAL at 18:10

## 2023-02-20 RX ADMIN — SODIUM CHLORIDE, PRESERVATIVE FREE 10 ML: 5 INJECTION INTRAVENOUS at 12:01

## 2023-02-20 ASSESSMENT — PAIN SCALES - GENERAL
PAINLEVEL_OUTOF10: 0
PAINLEVEL_OUTOF10: 0
PAINLEVEL_OUTOF10: 7

## 2023-02-20 ASSESSMENT — PAIN SCALES - WONG BAKER
WONGBAKER_NUMERICALRESPONSE: 0
WONGBAKER_NUMERICALRESPONSE: 6

## 2023-02-20 ASSESSMENT — PAIN DESCRIPTION - LOCATION: LOCATION: HEAD

## 2023-02-20 NOTE — ED NOTES
Pt moved to private room. Pt linens changed. New brief on pt. Pt provided boxed lunch. Pt has call light in reach. Pt requested lights off, pt denies needs at current time.       Carmelo Hernández RN  02/19/23 4506

## 2023-02-20 NOTE — ED PROVIDER NOTES
[facility]      Pt Name: Kulwinder Anglin  MRN: [id number]  [place] 1960  Date of evaluation: 2/19/23    CHIEF COMPLAINT       Chief Complaint   Patient presents with    Chest Pain    Cough       HISTORY OF PRESENT ILLNESS   Kulwinder Anglin is a 60 y.o. female who presents with cough, chest pain. Patient resides in [facility] home. Patiently admitted for SVT possible CHF. States she has had worsening cough chest pain occurring with the cough. Denies any shortness of breath. No oxygen requirement. PASTMEDICAL HISTORY     Past Medical History:   Diagnosis Date    Cerebral artery occlusion with cerebral infarction ([facility] [address]     Diabetes mellitus ([facility] [address]     HTN (hypertension)     Mental disability      Past Problem List  Patient Active Problem List   Diagnosis Code    New onset of congestive heart failure (HCC) I50.9    Paroxysmal supraventricular tachycardia (HCC) I47.1    Acute intractable tension-type headache G44.201    Post-concussion headache G44.309    Urinary tract infection without hematuria N39.0       SURGICAL HISTORY     History reviewed. No pertinent surgical history.     CURRENT MEDICATIONS       Previous Medications    APIXABAN (ELIQUIS) 5 MG TABS TABLET    Take 5 mg by mouth 2 times daily    ATORVASTATIN (LIPITOR) 20 MG TABLET    Take 20 mg by mouth nightly    CYANOCOBALAMIN 1000 MCG TABLET    Take 1,000 mcg by mouth daily    FENOFIBRATE (TRICOR) 145 MG TABLET    145 mg daily    FUROSEMIDE (LASIX) 40 MG TABLET    Take 1 tablet by mouth daily    GLIMEPIRIDE (AMARYL) 4 MG TABLET    4 mg with breakfast and with evening meal    HYDROXYZINE HCL (ATARAX) 25 MG TABLET    Take 25 mg by mouth nightly as needed    INSULIN ASPART (NOVOLOG FLEXPEN) 100 UNIT/ML INJECTION PEN    Inject into the skin 3 times daily (with meals)    METFORMIN (GLUCOPHAGE) 500 MG TABLET    500 mg 2 times daily (with meals)    METHENAMINE (HIPREX) 1 G TABLET    Take 1 g by mouth 2 times daily (with meals) METOPROLOL SUCCINATE (TOPROL XL) 50 MG EXTENDED RELEASE TABLET    50 mg daily    MYRBETRIQ 25 MG TB24    25 mg daily    QUETIAPINE (SEROQUEL) 25 MG TABLET    25 mg every evening    SERTRALINE (ZOLOFT) 25 MG TABLET        SPIRONOLACTONE (ALDACTONE) 25 MG TABLET    25 mg daily       ALLERGIES     has No Known Allergies. FAMILY HISTORY     has no family status information on file. SOCIAL HISTORY       Social History     Tobacco Use    Smoking status: Never    Smokeless tobacco: Never   Substance Use Topics    Alcohol use: Not Currently    Drug use: Not Currently       PHYSICAL EXAM     INITIAL VITALS: /73   Pulse 97   Temp 97.9 °F (36.6 °C) (Oral)   Resp 17   SpO2 93%    Physical Exam  Vitals and nursing note reviewed. Constitutional:       General: She is not in acute distress. Appearance: She is well-developed. She is obese. She is not toxic-appearing. HENT:      Head: Normocephalic and atraumatic. Nose: Nose normal.      Mouth/Throat:      Mouth: Mucous membranes are moist.   Eyes:      General: No scleral icterus. Conjunctiva/sclera: Conjunctivae normal.      Pupils: Pupils are equal, round, and reactive to light. Cardiovascular:      Rate and Rhythm: Regular rhythm. Tachycardia present. Pulmonary:      Effort: Pulmonary effort is normal. No respiratory distress. Breath sounds: Normal breath sounds. Abdominal:      Palpations: Abdomen is soft. Musculoskeletal:         General: Normal range of motion. Skin:     General: Skin is warm and dry. Findings: No erythema or rash. Neurological:      Mental Status: She is alert and oriented to person, place, and time. Psychiatric:         Behavior: Behavior normal.       MEDICAL DECISION MAKING / ED COURSE:   Summary of Patient Presentation:      1)  Number and Complexity of Problems  Problem List This Visit:    1. Hypokalemia    2. Acute upper respiratory infection    3.  Congestive heart failure, unspecified HF chronicity, unspecified heart failure type (HCC)    4. Chest pain, unspecified type        Differential Diagnosis: COVID versus URI versus pneumonia versus CHF    Diagnoses Considered but Do Not Suspect: Respiratory distress    Pertinent Comorbid Conditions:    Past Medical History:   Diagnosis Date    Cerebral artery occlusion with cerebral infarction ([facility] [address]     Diabetes mellitus ([facility] [address]     HTN (hypertension)     Mental disability        2)  Data Reviewed    External Documents Reviewed: Previous ER visits reviewed by myself  3)  Treatment and Disposition  [Patient repeat assessment, Disposition discussion with patient/family, Case discussed with consulting clinician, MIPS, Social determinants of health impacting treatment or disposition, Shared Decision Making, Code Status Discussion:]\    Rhythm: normal sinus   Rate: Tachycardic  Axis: Left  Conduction: normal  ST Segments: no acute change  T Waves: no acute change  Q Waves: no acute change    Clinical Impression: Tachycardic EKG, left axis deviation    Chest pain cough. She otherwise nontoxic, no tachypnea, speaking in full sentences. Slight tachycardia however doubt PE. No shortness of breath. No hypoxia. We will get cardiac work-up, COVID testing anticipate discharge if negative. Patient found to have quite low potassium at 2.8. Upon chart review was recently started on Lasix. Attempting p.o. replete meant however patient having difficulty tolerating this. Unclear if this is due to mental disability or nausea. Regardless given potassium symptoms will admit for replacement and medication adjustment. Discussed with Julissa Villegas NP agreeable to admission. DATA FOR LAB AND RADIOLOGY TESTS ORDERED BELOW ARE REVIEWED BY THE ED CLINICIAN:    RADIOLOGY: All x-rays, CT, MRI, and formal ultrasound images (except ED bedside ultrasound) are read by the radiologist, see reports below, unless otherwise noted in MDM or here. Reports below are reviewed by myself.   XR CHEST PORTABLE   Final Result   No acute abnormality. LABS: Lab orders shown below, the results are reviewed by myself, and all abnormals are listed below.   Labs Reviewed   BASIC METABOLIC PANEL - Abnormal; Notable for the following components:       Result Value    Glucose 147 (*)     Creatinine 0.93 (*)     Bun/Cre Ratio 24 (*)     Potassium 2.8 (*)     Chloride 95 (*)     All other components within normal limits   BRAIN NATRIURETIC PEPTIDE - Abnormal; Notable for the following components:    Pro- (*)     All other components within normal limits   CBC WITH AUTO DIFFERENTIAL - Abnormal; Notable for the following components:    RDW 14.7 (*)     Seg Neutrophils 66 (*)     Lymphocytes 22 (*)     Immature Granulocytes 1 (*)     All other components within normal limits   TROPONIN - Abnormal; Notable for the following components:    Troponin, High Sensitivity 15 (*)     All other components within normal limits   MAGNESIUM - Abnormal; Notable for the following components:    Magnesium 1.3 (*)     All other components within normal limits   COVID-19, RAPID       Vitals Reviewed:    Vitals:    02/19/23 2015 02/19/23 2020 02/19/23 2021 02/19/23 2022   BP: 124/73      Pulse: 98 98 96 97   Resp: 15 15 17 17   Temp:       TempSrc:       SpO2: 94% 94% 94% 93%     MEDICATIONS GIVEN TO PATIENT THIS ENCOUNTER:  Orders Placed This Encounter   Medications    guaiFENesin-codeine (GUAIFENESIN AC) 100-10 MG/5ML liquid 5 mL    magnesium sulfate 2000 mg in 50 mL IVPB premix    potassium bicarb-citric acid (EFFER-K) effervescent tablet 40 mEq    0.9% NaCl with KCl 40 mEq infusion     DISCHARGE PRESCRIPTIONS:  New Prescriptions    No medications on file     PHYSICIAN CONSULTS ORDERED THIS ENCOUNTER:  IP CONSULT TO HOSPITALIST    ED Course Notes From Epic Narrator:  ED Course as of 02/19/23 2155   Sun Feb 19, 2023 2048 Potassium(!!): 2.8 [MS]   2049 Magnesium(!): 1.3 [MS]   2054 Was recently started on Lasix which would explain low potassium. [MS]      ED Course User Index  [MS] Jairo Castano DO         CRITICAL CARE:   0    PROCEDURES:  none    FINAL IMPRESSION      1. Hypokalemia    2. Acute upper respiratory infection    3. Congestive heart failure, unspecified HF chronicity, unspecified heart failure type (HCC)    4. Chest pain, unspecified type          DISPOSITION/PLAN   DISPOSITION Decision To Admit 02/19/2023 09:19:25 PM      OUTPATIENT FOLLOW UP THE PATIENT:  No follow-up provider specified.    DISCHARGE MEDICATIONS:  New Prescriptions    No medications on file       Jairo Castano DO  EmergencyMedicine Attending    (Please note that portions of this note were completed with a voice recognition program.  Efforts were made to edit the dictations but occasionally words are mis-transcribed.)     Jairo Castano DO  02/19/23 2155       Jairo Castano DO  02/19/23 2155

## 2023-02-20 NOTE — PROGRESS NOTES
Occupational Therapy  Facility/Department: [facility] MED SURG  Occupational Therapy Initial Assessment    Name: Praveen Park  : 1960  MRN: [id number]  Date of Service: 2023    Discharge Recommendations:  Patient would benefit from continued therapy after discharge, 24 hour supervision or assist Due to recent hospitalization and medical condition, pt would benefit from additional intermittent skilled therapy at time of discharge. Please refer to the AM-PAC score for current functional status. 24 hour S/A    Patient Diagnosis(es): The primary encounter diagnosis was Hypokalemia. Diagnoses of Acute upper respiratory infection, Congestive heart failure, unspecified HF chronicity, unspecified heart failure type ([facility] [address] and Chest pain, unspecified type were also pertinent to this visit. Past Medical History:  has a past medical history of Cerebral artery occlusion with cerebral infarction ([facility] [address] Diabetes mellitus ([facility] [address] HTN (hypertension), and Mental disability. Past Surgical History:  has no past surgical history on file. Assessment   Performance deficits / Impairments: Decreased functional mobility ; Decreased ADL status; Decreased strength;Decreased safe awareness;Decreased endurance;Decreased balance;Decreased posture;Decreased cognition  Assessment: . Pt with deficits of strength, bed mobility, transfers,  balance, safety awareness and endurance this session,  & required 2 assist for SAFE functional mobility, & transfers, not able to tolerate ambulation away from bed this session. With current deficits, Pt HIGH risk for falls & requires continued OT to maximize independence with functional mobility, balance, safety awareness & activity tolerance.    Prognosis: Good  Decision Making: High Complexity  REQUIRES OT FOLLOW-UP: Yes  Activity Tolerance  Activity Tolerance: Treatment limited secondary to decreased cognition        Plan   Occupational Therapy Plan  Times Per Week: 4-5x/week  Current Treatment Recommendations: Strengthening, Balance training, Safety education & training, Functional mobility training, Self-Care / ADL, Patient/Caregiver education & training, Endurance training, Equipment evaluation, education, & procurement, Modalities, Positioning     Restrictions  Restrictions/Precautions  Restrictions/Precautions: Fall Risk, Up as Tolerated, General Precautions    Subjective   General  Chart Reviewed: Yes  Patient assessed for rehabilitation services?: Yes  Family / Caregiver Present: No     Social/Functional History  Social/Functional History  Lives With: Family (lives with brother (also MR), sister Kin Naidu helps as needed)  Type of Home: Apartment  Home Layout: One level  Home Access: Stairs to enter with rails  Entrance Stairs - Number of Steps: 3  Bathroom Equipment: Shower chair  Home Equipment: Eletha Bouquet, rolling, Hospital bed  Has the patient had two or more falls in the past year or any fall with injury in the past year?: Yes  Receives Help From: Home health (HHA; has help available 24/7, but unsure how much they actually come)  ADL Assistance: Needs assistance  Homemaking Assistance: Needs assistance (HHAs and sister?)  Ambulation Assistance: Independent (uses RW)  Transfer Assistance: Independent  Active : No  Occupation: On disability  Type of Occupation: Pt. states she works at [employer]  Additional Comments: Pt. readmitted 2/19 after d/c home with home care 2/9/23. Per previous chart, pt. has aides 24/7 from [facility]. Pt. with \"mental disability\" per chart. Speech difficult to understand. RN had obtained some social functional information from pt's sister Kin Naidu who called the hospital from a cruise ship.        Objective   Heart Rate: (!) 105  Heart Rate Source: Monitor  BP: 127/74  BP Location: Left lower arm  BP Method: Automatic  Patient Position: Supine  MAP (Calculated): 92  Resp: 18  SpO2: 93 %  O2 Device: None (Room air) Observation/Palpation  Posture: Good  Observation: Pt. stated she was unwilling to ambulate at this time and became angry when encouragement given. Assessment done standing at bedside. Safety Devices  Type of Devices: All fall risk precautions in place;Call light within reach;Gait belt;Patient at risk for falls; Left in bed;Nurse notified; Bed alarm in place  Balance  Sitting: High guard (CGA sitting EOB x 5 min in prep for standing)  Standing: With support (min-mod A x 2 with RW. Pt needing manual cues throughout.)  Gait  Overall Level of Assistance: Minimum assistance; Moderate assistance;Assist X2 (pt only able to take a couple steps at bedside. Pt wanting to get back to bed and needing guidance to move walker laterally along bedside)  Interventions: Safety awareness training;Verbal cues; Tactile cues; Visual cues  Assistive Device: Gait belt;Walker, rolling     AROM: Within functional limits  Strength: Generally decreased, functional  Coordination: Generally decreased, functional  Tone: Normal  Sensation:  (unable to state)  ADL  Feeding: Setup;Stand by assistance  Grooming: Minimal assistance;Verbal cueing;Setup  UE Bathing: Moderate assistance  LE Bathing: Maximum assistance  UE Dressing: Moderate assistance  LE Dressing: Maximum assistance  Toileting: Maximum assistance  Additional Comments: pt is limited by dec cognition. Pt needing encouragement to get OOB. Activity Tolerance  Activity Tolerance: Patient limited by fatigue  Activity Tolerance Comments: Refused gait  Bed mobility  Supine to Sit: Contact guard assistance  Sit to Supine: Contact guard assistance  Scooting: Contact guard assistance  Transfers  Sit to stand: Minimal assistance;2 Person assistance; Moderate assistance  Stand to sit: Minimal assistance;2 Person assistance; Moderate assistance  Transfer Comments: mod-max cues for safe techs  Vision  Vision: Impaired (Pt. had glasses that \"broke\")  Hearing  Hearing: Exceptions to [address] Exceptions: Hard of hearing/hearing concerns  Cognition  Overall Cognitive Status: Exceptions  Following Commands: Follows one step commands with repetition  Memory: Decreased recall of biographical Information  Safety Judgement: Decreased awareness of need for assistance;Decreased awareness of need for safety  Problem Solving: Assistance required to identify errors made;Assistance required to correct errors made  Insights: Decreased awareness of deficits  Initiation: Requires cues for some  Sequencing: Requires cues for some  Cognition Comment: MRDD; unsure of pt's level of awareness of her deficits as minimal mobility performed at bedside;  moved impulsively at times. Orientation  Overall Orientation Status: Impaired (did not formally assess)  Perception  Overall Perceptual Status: WFL               Education Given To: Patient  Education Provided: Role of Therapy;Plan of Care;Home Exercise Program;Precautions; ADL Adaptive Strategies;Transfer Training;Energy Conservation; Fall Prevention Strategies; Equipment; Family Education  Education Method: Demonstration;Verbal  Barriers to Learning: Cognition  Education Outcome: Continued education needed; Unable to verbalize; Unable to demonstrate understanding                        AM-PAC Score        AM-PAC Inpatient Daily Activity Raw Score: 13 (02/20/23 1506)  AM-PAC Inpatient ADL T-Scale Score : 32.03 (02/20/23 1506)  ADL Inpatient CMS 0-100% Score: 63.03 (02/20/23 1506)  ADL Inpatient CMS G-Code Modifier : CL (02/20/23 1506)    Tinneti Score       Goals  Short Term Goals  Time Frame for Short Term Goals: by discharge, pt will  Short Term Goal 1: demo CGA/ min A with ADL transfers with approp AD/DME and min cues for good safety  Short Term Goal 2: demo min A with functional mob in room distances with min cues for good safety, pacing for ADL completion  Short Term Goal 3: demo min A with toileting routine with DME as needed  Short Term Goal 4: demo SBA with grooming tasks following set up with min cues for initiation/sequencing  Short Term Goal 5: demo min A with UB ADLs and mod A with LB ADLs with AE/DME as needed with min cues for initiation/sequencing  Patient Goals   Patient goals : to go back home       Therapy Time   Individual Concurrent Group Co-treatment   Time In 1201         Time Out 1235         Minutes 34          Treatment min: 21  Co-treatment with PT warranted secondary to decreased safety and independence requiring 2 skilled therapy professionals to address individual discipline's goals. OT addressing preparation for ADL transfer, sitting balance for increased ADL performance, sitting/activity tolerance, functional reaching, environmental safety/scanning, fall prevention, functional mobility for ADL transfers, ability to sequence and follow directions, bed mobility tech, and functional UE strength.         Randy Corrigan, OT

## 2023-02-20 NOTE — PROGRESS NOTES
Pt sister Madie Velasquez states medication list in chart should be correct as is.  Message sent to medication reconciliation

## 2023-02-20 NOTE — PROGRESS NOTES
Physical Therapy  Facility/Department: [facility] MED SURG  Physical Therapy Initial Assessment    Name: Selvin Diaz  : 1960  MRN: [id number]  Date of Service: 2023    Discharge Recommendations:    Due to recent hospitalization and medical condition, pt would benefit from additional intermittent skilled therapy at time of discharge. Please refer to the AM-PAC score for current functional status. Selvin Diaz is a 60 y.o. female who presents with cough, chest pain. Patient resides in group home. Patiently admitted for SVT possible CHF. States she has had worsening cough chest pain occurring with the cough. Denies any shortness of breath. No oxygen requirement. Patient Diagnosis(es): The primary encounter diagnosis was Hypokalemia. Diagnoses of Acute upper respiratory infection, Congestive heart failure, unspecified HF chronicity, unspecified heart failure type ([address] and Chest pain, unspecified type were also pertinent to this visit. Past Medical History:  has a past medical history of Cerebral artery occlusion with cerebral infarction ([address] Diabetes mellitus ([address] HTN (hypertension), and Mental disability. Past Surgical History:  has no past surgical history on file. Assessment   Body Structures, Functions, Activity Limitations Requiring Skilled Therapeutic Intervention: Decreased functional mobility ; Decreased cognition  Assessment: Pt. with MRDD and appears to have 24/7 support at home from aides and sister. Will progress as able.   Specific Instructions for Next Treatment: gait training  Therapy Prognosis: Excellent  Decision Making: High Complexity  Barriers to Learning: MRDD  Requires PT Follow-Up: Yes  Activity Tolerance  Activity Tolerance: Patient limited by fatigue  Activity Tolerance Comments: Refused gait     Plan   Physcial Therapy Plan  General Plan: 5-7 times per week  Specific Instructions for Next Treatment: gait training  Current Treatment Recommendations: Strengthening, ROM, Balance training, Functional mobility training, Transfer training, Gait training, Cognitive reorientation, Home exercise program, Safety education & training, Patient/Caregiver education & training, Therapeutic activities  Safety Devices  Type of Devices: All fall risk precautions in place, Call light within reach, Gait belt, Patient at risk for falls, Left in bed, Nurse notified, Bed alarm in place     Restrictions  Restrictions/Precautions  Restrictions/Precautions: Fall Risk, Up as Tolerated, General Precautions     Subjective   General  Chart Reviewed: Yes  Patient assessed for rehabilitation services?: Yes  Family / Caregiver Present: No  Follows Commands: Within Functional Limits         Social/Functional History  Social/Functional History  Lives With: Family (lives with brother (also MR), sister Tierra Ty helps as needed)  Type of Home: Apartment  Home Layout: One level  Home Access: Stairs to enter with rails  Entrance Stairs - Number of Steps: 3  Bathroom Equipment: Shower chair  Home Equipment: Jamie Crew, rolling, Hospital bed  Has the patient had two or more falls in the past year or any fall with injury in the past year?: Yes  Receives Help From: Home health (HHA; has help available 24/7, but unsure how much they actually come)  ADL Assistance: Needs assistance  Homemaking Assistance: Needs assistance (HHAs and sister?)  Ambulation Assistance: Independent (uses RW)  Transfer Assistance: Independent  Active : No  Occupation: On disability  Type of Occupation: Pt. states she works at [employer]  Additional Comments: Pt. readmitted 2/19 after d/c home with home care 2/9/23. Per previous chart, pt. has aides 24/7 from [facility]. Pt. with \"mental disability\" per chart. Speech difficult to understand. RN had obtained some social functional information from pt's sister Tierra Ty who called the hospital from a cruise ship.   Vision/Hearing  Vision  Vision:  (Pt. had glasses that \"broke\")    Cognition   Orientation  Overall Orientation Status:  (did not formally assess)  Cognition  Overall Cognitive Status: Exceptions  Memory: Decreased recall of biographical Information  Safety Judgement: Decreased awareness of need for assistance;Decreased awareness of need for safety  Problem Solving: Assistance required to identify errors made;Assistance required to correct errors made  Insights: Decreased awareness of deficits  Initiation: Requires cues for some  Sequencing: Requires cues for some  Cognition Comment: MRDD; unsure of pt's level of awareness of her deficits as minimal mobility performed at bedside;  moved impulsively at times. Objective   Heart Rate: (!) 105  Heart Rate Source: Monitor  BP: 127/74  BP Location: Left lower arm  BP Method: Automatic  Patient Position: Supine  MAP (Calculated): 92  Resp: 18  SpO2: 93 %  O2 Device: None (Room air)     Observation/Palpation  Posture: Good  Observation: Pt. stated she was unwilling to ambulate at this time and became angry when encouragement given. Assessment done standing at bedside. Gross Assessment  AROM: Generally decreased, functional  Strength: Generally decreased, functional       Bed mobility  Supine to Sit: Contact guard assistance  Sit to Supine: Contact guard assistance  Scooting: Contact guard assistance  Transfers  Sit to Stand: Minimal Assistance;2 Person Assistance; Moderate Assistance (first stand stood impulsively without gait belt or walker and had to have pt. sit back down; very unsteady)  Stand to Sit: Minimal Assistance;2 Person Assistance (cues for hand placement)  Comment: Pt. took several sidesteps along EOB with RW and min A x 2. Pt. slightly unsteady and unpredictable. Refused gait at this time as she was tired. Assisted back to bed. Exercise Treatment: Simple instructions for pt. to \"move her feet\" and \"move her legs\" while in bed. Pt. able to properly demo this with these instructions. OutComes Score                                                  AM-PAC Score  AM-PAC Inpatient Mobility Raw Score : 12 (02/20/23 1430)  AM-PAC Inpatient T-Scale Score : 35.33 (02/20/23 1430)  Mobility Inpatient CMS 0-100% Score: 68.66 (02/20/23 1430)  Mobility Inpatient CMS G-Code Modifier : CL (02/20/23 1430)          Tinneti Score       Goals  Short Term Goals  Time Frame for Short Term Goals: 12 visits:  Short Term Goal 1: Pt. to be indep with bed mob. using bed rails and controls to assist as needed as pt has hospital bed at home  Short Term Goal 2: Pt. to be CGA for gait with RW, 25ft. Short Term Goal 3: Pt. to have good- standing dynamic balance with RW  Short Term Goal 4: Pt. to tolerate 25+ min. of PT daily for ther ex/ gait/balance training  Patient Goals   Patient Goals : wants to go home       Education  Patient Education  Education Given To: Patient  Education Provided: Role of Therapy;Plan of Care      Therapy Time   Individual Concurrent Group Co-treatment   Time In 1151         Time Out 1225         Minutes 34             Treatment time:  24min.     Macy Diaz, PT

## 2023-02-20 NOTE — CONSULTS
[address]  [phone]               Cardiology Consult           Date of Admission:  2/19/2023  Date of Consultation:  2/20/2023      PCP:  Domenic Null MD      Chief Complaint: Chest pain    History of Present Illness:  Yaa Lovell is a 60 y.o. female who presents with  past medical history significant for heart failure with preserved ejection fraction. She was recently hospitalized for exacerbation. Discharged little over week ago. Who presents with an episode of chest pain. EKG was unchanged from prior. Troponin was noted to be slightly elevated with high sensitivity troponin of 15. This is down from a high sensitivity troponin of 49 several weeks ago. Chest pain has completely resolved. She is sitting comfortably. Prior workup has included a cardiac catheterization several years ago which showed normal epicardial coronary arteries. PMH:   has a past medical history of Cerebral artery occlusion with cerebral infarction ([facility] [address] Diabetes mellitus ([facility] [address] HTN (hypertension), and Mental disability. PSH:   has no past surgical history on file. Allergies:  No Known Allergies     Home Meds:    Prior to Admission medications    Medication Sig Start Date End Date Taking?  Authorizing Provider   furosemide (LASIX) 40 MG tablet Take 1 tablet by mouth daily 2/10/23   Gabi Schmidt MD   methenamine (HIPREX) 1 g tablet Take 1 g by mouth 2 times daily (with meals)    Historical Provider, MD   apixaban (ELIQUIS) 5 MG TABS tablet Take 5 mg by mouth 2 times daily 1/29/21   Historical Provider, MD   atorvastatin (LIPITOR) 20 MG tablet Take 20 mg by mouth nightly 4/17/21   Historical Provider, MD   cyanocobalamin 1000 MCG tablet Take 1,000 mcg by mouth daily 4/17/21   Historical Provider, MD   fenofibrate (TRICOR) 145 MG tablet 145 mg daily 1/26/23   Historical Provider, MD   glimepiride (AMARYL) 4 MG tablet 4 mg with breakfast and with evening meal 1/26/23   Historical Provider, MD   hydrOXYzine HCl (ATARAX) 25 MG tablet Take 25 mg by mouth nightly as needed 2/4/21   Historical Provider, MD   metFORMIN (GLUCOPHAGE) 500 MG tablet 500 mg 2 times daily (with meals) 1/26/23   Historical Provider, MD   insulin aspart (NOVOLOG FLEXPEN) 100 UNIT/ML injection pen Inject into the skin 3 times daily (with meals) 12/3/19   Historical Provider, MD   metoprolol succinate (TOPROL XL) 50 MG extended release tablet 50 mg daily 1/26/23   Historical Provider, MD   MYRBETRIQ 25 MG TB24 25 mg daily 1/26/23   Historical Provider, MD   QUEtiapine (SEROQUEL) 25 MG tablet 25 mg every evening 1/26/23   Historical Provider, MD   sertraline (ZOLOFT) 25 MG tablet  1/26/23   Historical Provider, MD   spironolactone (ALDACTONE) 25 MG tablet 25 mg daily 1/26/23   Historical Provider, MD        Hospital Meds:    Current Facility-Administered Medications   Medication Dose Route Frequency Provider Last Rate Last Admin    apixaban (ELIQUIS) tablet 5 mg  5 mg Oral BID Reid Up MD   5 mg at 02/20/23 1159    atorvastatin (LIPITOR) tablet 20 mg  20 mg Oral Nightly Reid Up MD        vitamin B-12 (CYANOCOBALAMIN) tablet 1,000 mcg  1,000 mcg Oral Daily Tameka Oro MD   1,000 mcg at 02/20/23 1159    fenofibrate (TRIGLIDE) tablet 160 mg  160 mg Oral Daily Reid Up MD   160 mg at 02/20/23 1159    [START ON 2/21/2023] glipiZIDE (GLUCOTROL) tablet 5 mg  5 mg Oral QAM AC Reid Up MD        hydrOXYzine HCl (ATARAX) tablet 25 mg  25 mg Oral Nightly PRN Reid Up MD        metFORMIN (GLUCOPHAGE) tablet 500 mg  500 mg Oral BID WC Reid Up MD   500 mg at 02/20/23 1159    metoprolol succinate (TOPROL XL) extended release tablet 50 mg  50 mg Oral Daily Reid Up MD   50 mg at 02/20/23 1159    QUEtiapine (SEROQUEL) tablet 25 mg  25 mg Oral QPM Reid Up MD        sertraline (ZOLOFT) tablet 25 mg  25 mg Oral Daily Reid Gomez MD Jeovanny   25 mg at 02/20/23 1159    spironolactone (ALDACTONE) tablet 25 mg  25 mg Oral Daily Reid Up MD   25 mg at 02/20/23 1159    methenamine (HIPREX) tablet 1 g  1 g Oral BID  Reid Up MD        trospium (SANCTURA) tablet 20 mg  20 mg Oral Nightly Reid Up MD        furosemide (LASIX) injection 20 mg  20 mg IntraVENous BID Reid Up MD   20 mg at 02/20/23 1159    glucose chewable tablet 16 g  4 tablet Oral PRN Reid Up MD        dextrose bolus 10% 125 mL  125 mL IntraVENous PRN Helen Carrion MD        Or    dextrose bolus 10% 250 mL  250 mL IntraVENous PRN Helen Carrion MD        glucagon (rDNA) injection 1 mg  1 mg SubCUTAneous PRN Reid Up MD        dextrose 10 % infusion   IntraVENous Continuous PRN Reid Up MD        insulin lispro (HUMALOG) injection vial 0-16 Units  0-16 Units SubCUTAneous TID  Reid Up MD   8 Units at 02/20/23 1157    insulin lispro (HUMALOG) injection vial 0-4 Units  0-4 Units SubCUTAneous Nightly Reid Up MD        sodium chloride flush 0.9 % injection 5-40 mL  5-40 mL IntraVENous 2 times per day Beena Blakely Lump, APRN - CNP   10 mL at 02/20/23 1201    sodium chloride flush 0.9 % injection 10 mL  10 mL IntraVENous PRN Mitra A Lump, APRN - CNP        0.9 % sodium chloride infusion   IntraVENous PRN Mitra A Lump, APRN - CNP        potassium chloride (KLOR-CON M) extended release tablet 40 mEq  40 mEq Oral PRN Mitra A Lump, APRN - CNP        Or    potassium bicarb-citric acid (EFFER-K) effervescent tablet 40 mEq  40 mEq Oral PRN Mitra A Lump, APRN - CNP        Or    potassium chloride 10 mEq/100 mL IVPB (Peripheral Line)  10 mEq IntraVENous PRN Mitra A Lump, APRN - CNP        magnesium sulfate 1000 mg in dextrose 5% 100 mL IVPB  1,000 mg IntraVENous PRN Mitra A Lump, APRN - CNP        ondansetron (ZOFRAN-ODT) disintegrating tablet 4 mg  4 mg Oral Q8H PRN Mitra A Lump, APRN - CNP        Or    ondansetron [facility]) injection 4 mg  4 mg IntraVENous Q6H PRN Mitra A Lump, APRN - CNP        polyethylene glycol (GLYCOLAX) packet 17 g  17 g Oral Daily PRN Mitra A Lump, APRN - CNP        acetaminophen (TYLENOL) tablet 650 mg  650 mg Oral Q6H PRN Mitra A Lump, APRN - CNP   650 mg at 02/20/23 7062    Or    acetaminophen (TYLENOL) suppository 650 mg  650 mg Rectal Q6H PRN Mitra A Lump, APRN - CNP        glucagon (rDNA) injection 1 mg  1 mg IntraMUSCular PRN Mitra A Lump, APRN - CNP        dextrose 10 % infusion   IntraVENous Continuous PRN Mitra A Lump, APRN - CNP           Social History:       TOBACCO:   reports that she has never smoked. She has never used smokeless tobacco.  ETOH:   reports that she does not currently use alcohol. DRUGS:  reports that she does not currently use drugs. OCCUPATION:          Family Histroy:     History reviewed. No pertinent family history. Review of Systems:   Constitutional: there has been no unanticipated weight loss. There's been no change in energy level, sleep pattern, or activity level. Eyes: No visual changes or diplopia. No scleral icterus. ENT: No Headaches, hearing loss or vertigo. No mouth sores or sore throat. Cardiovascular: No chest pain, dyspnea on exertion, palpitations or loss of consciousness. No cough, hemoptysis, pleuritic pain, or phlebitis. Respiratory: No cough or wheezing, no sputum production. No hematemesis. Gastrointestinal: No abdominal pain, appetite loss, blood in stools. No change in bowel or bladder habits. Genitourinary: No dysuria, trouble voiding, or hematuria. Musculoskeletal:  No gait disturbance, weakness or joint complaints. Integumentary: No rash or pruritis. Neurological: No headache, diplopia, change in muscle strength, numbness or tingling. No change in gait, balance, coordination, mood, affect, memory, mentation, behavior. Psychiatric: No anxiety, or depression. Endocrine: No temperature intolerance.  No excessive thirst, fluid intake, or urination. No tremor. Hematologic/Lymphatic: No abnormal bruising or bleeding, blood clots or swollen lymph nodes. Allergic/Immunologic: No nasal congestion or hives. Physical Exam    Vital Signs: /76   Pulse 92   Temp 99.7 °F (37.6 °C) (Oral)   Resp 18   SpO2 94%        Admission       General appearance: Awake, Alert Cooperative    Head: Normocephalic, without obvious abnormality, atraumatic    Eyes: Conjunctivae/corneas clear. PERRL, EOM's intact. Fundi benign    Neck: no adenopathy, no carotid bruit, no JVD, supple, symmetrical, trachea midline and thyroid: not enlarged, symmetric, no tenderness/mass/nodules    Lungs: clear to auscultation bilaterally    Heart: regular rate and rhythm, S1, S2 normal, no murmur, click, rub or gallop    Abdomen: Soft, non-tender. Bowel sounds normal. No masses,  no organomegaly    Extremities: extremities normal, atraumatic, no cyanosis or edema    Skin: Skin color, texture, turgor normal. No rashes or lesions    Neurologic: Grossly normal            Labs:      CBC:   Recent Labs     02/19/23 1923 02/20/23  0555   WBC 7.7 5.8   HGB 12.0 10.9*   HCT 38.4 35.6*   MCV 86.9 87.9    167     BMP:   Recent Labs     02/19/23 1923 02/20/23  0555    137   K 2.8* 3.7   CL 95* 102   CO2 27 25   BUN 22 18   CREATININE 0.93* 0.81     PT/INR: No results for input(s): PROTIME, INR in the last 72 hours. APTT: No results for input(s): APTT in the last 72 hours. MAG:   Recent Labs     02/19/23 1923 02/20/23  0555   MG 1.3* 1.8     D Dimer: No results for input(s): DDIMER in the last 72 hours. Troponin T   Recent Labs     02/19/23 1923   TROPHS 15*     ProBNP Invalid input(s): PRO-BNP    XR CHEST PORTABLE    Result Date: 2/19/2023  No acute abnormality. Diagnosis:  Principal Problem:    Hypokalemia  Resolved Problems:    * No resolved hospital problems. *          Plan:      Somewhat atypical chest pain.   Doubt that this is acute coronary syndrome. Will continue to monitor troponins. Prior cardiovascular workup is included an echocardiogram which essentially showed normal LV systolic function. Will hold off on ischemic evaluation at this time. Can consider Lexiscan stress test if chest pain recurs or if any other issues. Will continue to follow with you.       Electronically signed by Brendan Donovan MD on 2/20/2023 at 4:55 PM

## 2023-02-20 NOTE — H&P
History & Physical  [facility]    Adult Hospitalist      Name: Jayla Iqbal  MRN: [id number]     [place]: [de-identified]  Room: 2105/2105-01    Admit Date: 2/19/2023  7:16 PM  PCP: Анна Sierra MD    Primary Problem  Principal Problem:    Hypokalemia  Resolved Problems:    * No resolved hospital problems. *        Assesment:     Chest pain  Elevated troponin  Paroxysmal supraventricular tachycardia-Eliquis  Hypertension  Hyperlipidemia  Diabetes type 2  History of CVA  Cognitive impairment      Plan:     Admitted to [place] [address] telemetry  O2 maintain oxygen saturation greater 92%    Serial troponin  Cardiology consult        Continue to monitor/telemetry/CBC with differential daily/BMP daily  DVT and GI prophylaxis.   Continue medications as below      Scheduled Meds:   apixaban  5 mg Oral BID    atorvastatin  20 mg Oral Nightly    cyanocobalamin  1,000 mcg Oral Daily    fenofibrate  160 mg Oral Daily    [START ON 2/21/2023] glipiZIDE  5 mg Oral QAM AC    metFORMIN  500 mg Oral BID WC    metoprolol succinate  50 mg Oral Daily    QUEtiapine  25 mg Oral QPM    sertraline  25 mg Oral Daily    spironolactone  25 mg Oral Daily    methenamine  1 g Oral BID WC    trospium  20 mg Oral Nightly    furosemide  20 mg IntraVENous BID    insulin lispro  0-16 Units SubCUTAneous TID WC    insulin lispro  0-4 Units SubCUTAneous Nightly    sodium chloride flush  5-40 mL IntraVENous 2 times per day     Continuous Infusions:   dextrose      sodium chloride      dextrose       PRN Meds:  hydrOXYzine HCl, 25 mg, Nightly PRN  glucose, 4 tablet, PRN  dextrose bolus, 125 mL, PRN   Or  dextrose bolus, 250 mL, PRN  glucagon (rDNA), 1 mg, PRN  dextrose, , Continuous PRN  sodium chloride flush, 10 mL, PRN  sodium chloride, , PRN  potassium chloride, 40 mEq, PRN   Or  potassium alternative oral replacement, 40 mEq, PRN   Or  potassium chloride, 10 mEq, PRN  magnesium sulfate, 1,000 mg, PRN  ondansetron, 4 mg, Q8H PRN   Or  ondansetron, 4 mg, Q6H PRN  polyethylene glycol, 17 g, Daily PRN  acetaminophen, 650 mg, Q6H PRN   Or  acetaminophen, 650 mg, Q6H PRN  glucagon (rDNA), 1 mg, PRN  dextrose, , Continuous PRN        Chief Complaint:     Chief Complaint   Patient presents with    Chest Pain    Cough         History of Present Illness:      Gage Page is a 60 y.o.  female who presents with Chest Pain and Cough    23-year-old lady with past medical history of CVA, hypertension, hyperlipidemia, diabetes, PAT presented to ER complaining of cough and chest pain. Patient has cognitive impairment/poor historian. She denies any fever, chills, nausea, vomiting, changes in urination, habit or rash. On presentation potassium was 2.8. Hemoglobin was 10.9. Troponin was 15. I have personally reviewed the past medical history, past surgical history, medications, social history, and family history, and summarized in the note. Review of Systems:     All 10 point system is reviewed and negative otherwise mentioned in HPI. Past Medical History:     Past Medical History:   Diagnosis Date    Cerebral artery occlusion with cerebral infarction ([facility] [address]     Diabetes mellitus ([facility] [address]     HTN (hypertension)     Mental disability         Past Surgical History:     History reviewed. No pertinent surgical history. Medications Prior to Admission:       Prior to Admission medications    Medication Sig Start Date End Date Taking?  Authorizing Provider   furosemide (LASIX) 40 MG tablet Take 1 tablet by mouth daily 2/10/23   Edwar Sanz MD   methenamine (HIPREX) 1 g tablet Take 1 g by mouth 2 times daily (with meals)    Historical Provider, MD   apixaban (ELIQUIS) 5 MG TABS tablet Take 5 mg by mouth 2 times daily 1/29/21   Historical Provider, MD   atorvastatin (LIPITOR) 20 MG tablet Take 20 mg by mouth nightly 4/17/21   Historical Provider, MD   cyanocobalamin 1000 MCG tablet Take 1,000 mcg by mouth daily 4/17/21   Historical Provider, MD   fenofibrate (Mary Becker) 145 MG tablet 145 mg daily 23   Historical Provider, MD   glimepiride (AMARYL) 4 MG tablet 4 mg with breakfast and with evening meal 23   Historical Provider, MD   hydrOXYzine HCl (ATARAX) 25 MG tablet Take 25 mg by mouth nightly as needed 21   Historical Provider, MD   metFORMIN (GLUCOPHAGE) 500 MG tablet 500 mg 2 times daily (with meals) 23   Historical Provider, MD   insulin aspart (NOVOLOG FLEXPEN) 100 UNIT/ML injection pen Inject into the skin 3 times daily (with meals) 12/3/19   Historical Provider, MD   metoprolol succinate (TOPROL XL) 50 MG extended release tablet 50 mg daily 23   Historical Provider, MD   MYRBETRIQ 25 MG TB24 25 mg daily 23   Historical Provider, MD   QUEtiapine (SEROQUEL) 25 MG tablet 25 mg every evening 23   Historical Provider, MD   sertraline (ZOLOFT) 25 MG tablet  23   Historical Provider, MD   spironolactone (ALDACTONE) 25 MG tablet 25 mg daily 23   Historical Provider, MD        Allergies:       Patient has no known allergies. Social History:     Tobacco:    reports that she has never smoked. She has never used smokeless tobacco.  Alcohol:      reports that she does not currently use alcohol. Drug Use:  reports that she does not currently use drugs. Family History:     History reviewed. No pertinent family history.       Physical Exam:     Vitals:  /76   Pulse 92   Temp 99.7 °F (37.6 °C) (Oral)   Resp 18   SpO2 94%   Temp (24hrs), Av.4 °F (36.9 °C), Min:97.7 °F (36.5 °C), Max:99.7 °F (37.6 °C)          General appearance - alert, well appearing, and in no acute distress  Mental status - oriented to person, place, and time with normal affect  Head - normocephalic and atraumatic  Eyes - pupils equal and reactive, extraocular eye movements intact, conjunctiva clear  Ears - hearing appears to be intact  Nose - no drainage noted  Mouth - mucous membranes moist  Neck - supple, no carotid bruits, thyroid not palpable  Chest - clear to auscultation, normal effort  Heart - normal rate, regular rhythm, no murmur  Abdomen - soft, nontender, nondistended, bowel sounds present all four quadrants, no masses, hepatomegaly or splenomegaly  Neurological - normal speech, no focal findings or movement disorder noted, cranial nerves II through XII grossly intact  Extremities - peripheral pulses palpable, no pedal edema or calf pain with palpation  Skin - no gross lesions, rashes, or induration noted        Data:     Labs:    Hematology:  Recent Labs     02/19/23 1923 02/20/23  0555   WBC 7.7 5.8   RBC 4.42 4.05   HGB 12.0 10.9*   HCT 38.4 35.6*   MCV 86.9 87.9   MCH 27.1 26.9   MCHC 31.3 30.6   RDW 14.7* 14.6*    167   MPV 10.2 10.0     Chemistry:  Recent Labs     02/19/23 1923 02/20/23  0555    137   K 2.8* 3.7   CL 95* 102   CO2 27 25   GLUCOSE 147* 119*   BUN 22 18   CREATININE 0.93* 0.81   MG 1.3* 1.8   ANIONGAP 13 10   LABGLOM >60 >60   CALCIUM 9.2 8.6   PROBNP 495*  --    TROPHS 15*  --      Recent Labs     02/20/23  0809 02/20/23  1149 02/20/23  1607   POCGLU 148* 288* 165*       Lab Results   Component Value Date    INR 1.7 02/05/2023    INR 1.1 08/24/2012    PROTIME 19.9 (H) 02/05/2023    PROTIME 11.4 08/24/2012       Lab Results   Component Value Date/Time    SPECIAL NOT REPORTED 08/29/2012 06:42 PM     Lab Results   Component Value Date/Time    CULTURE ESCHERICHIA COLI >968459 CFU/ML (A) 02/07/2023 06:24 PM       No results found for: POCPH, PHART, PH, POCPCO2, MLD6EZJ, PCO2, POCPO2, PO2ART, PO2, POCHCO3, STW5WWP, HCO3, NBEA, PBEA, BEART, BE, THGBART, THB, SGZ3CUH, TOTC5XAP, H5IPLRIS, O2SAT, FIO2    Radiology:    XR CHEST PORTABLE    Result Date: 2/19/2023  No acute abnormality.          All radiological studies reviewed                Code Status:  Full Code    Electronically signed by Lisa Khan MD on 2/20/2023 at 4:28 PM     Copy sent to Dr. Maryam MD    This note was created with the assistance of a speech-recognition program.  Although the intention is to generate a document that actually reflects the content of the visit, no guarantees can be provided that every mistake has been identified and corrected by editing. Note was updated later by me after  physical examination and  completion of the assessment.

## 2023-02-20 NOTE — ED NOTES
ED to inpatient nurses report     Chief Complaint   Patient presents with    Chest Pain    Cough      Present to ED from [facility]  LOC: alert and orientated to name, place, date  Vital signs   Vitals:    02/19/23 2022 02/20/23 0107 02/20/23 0149 02/20/23 0439   BP:  113/71  126/69   Pulse: 97 91  94   Resp: 17 17  18   Temp:    98.4 °F (36.9 °C)   TempSrc:    Oral   SpO2: 93%  95% 94%      Oxygen Baseline room air    Current needs required room air   SEPSIS: no  [no] Lactate X 2 ordered (Yes or No)  [no] Antibiotics given (Yes or No)  [no] IV Fluids ordered (Yes or No)             [yes] IV completed (Yes or No)  [yes] Hourly Vital Signs (Validated)  [no] Outstanding Orders:     LDAs:   Peripheral IV 02/19/23 Right Antecubital (Active)     Mobility: Requires assistance * 1  Fall Risk: [place] 1 Fall Risk  Presents to emergency department  because of falls (Syncope, seizure, or loss of consciousness): No (02/19/23 1924)  Age > 70: No (02/19/23 1924)  Altered Mental Status, Intoxication with alcohol or substance confusion (Disorientation, impaired judgment, poor safety awaremess, or inability to follow instructions): No (02/19/23 1924)  Impaired Mobility: Ambulates or transfers with assistive devices or assistance; Unable to ambulate or transer.: (S) Yes (uses walkers; reports only 2 falls in her hx) (02/19/23 1924)  Nursing Judgement: Yes (02/19/23 1924)   Pt reports that she uses a walker at the [facility] and reports that she has only fallen twice. Pt here in ED refused to stand and pivot from ED cot to hospital bed. Pt did assist herself in rolling in bed during brief change.   Pending ED orders: morning labs pending  Present condition: comfortable  Code Status: full  Consults: IP CONSULT TO HOSPITALIST  []  Hospitalist  Completed  [] yes [] no Who:   []  Medicine  Completed  [] yes [] No Who:   []  Cardiology  Completed  [] yes [] No Who:   []  GI   Completed  [] yes [] No Who:   []  Neurology  Completed  [] yes [] No Who:   []  Nephrology Completed  [] yes [] No Who:    []  Vascular  Completed  [] yes [] No Who:   []  Ortho  Completed  [] yes [] No Who:     []  Surgery  Completed  [] yes [] No Who:    []  Urology  Completed  [] yes [] No Who:    []  CT Surgery Completed  [] yes [] No Who:   []  Podiatry  Completed  [] yes [] No Who:    []  Other    Completed  [] yes [] No Who:  Interventions: IV K, oral K, cardiac monitoring  Important Events: Pt arrives to ED from group home via EMS. Pt reports that she developed chest pain at 1900 on 2/19. Pt was unable to describe pain or report if it radiated anywhere. Pt also reports that she developed a cough around 1900, pt denies SOB. Pt has a nonproductive dry cough. Pt reports intermittent chest pain after coughing fit. Pt is incontinent and wears a brief. Pt has redness under all skin folds.           Electronically signed by Santhosh Yap RN on 2/20/2023 at 5:45 AM     Santhosh Yap RN  02/20/23 4925

## 2023-02-21 VITALS
RESPIRATION RATE: 18 BRPM | BODY MASS INDEX: 31.09 KG/M2 | DIASTOLIC BLOOD PRESSURE: 64 MMHG | SYSTOLIC BLOOD PRESSURE: 115 MMHG | HEART RATE: 88 BPM | WEIGHT: 222.9 LBS | TEMPERATURE: 98.1 F | OXYGEN SATURATION: 95 %

## 2023-02-21 LAB
ABSOLUTE EOS #: 0.1 K/UL (ref 0–0.44)
ABSOLUTE IMMATURE GRANULOCYTE: 0.03 K/UL (ref 0–0.3)
ABSOLUTE LYMPH #: 1.79 K/UL (ref 1.1–3.7)
ABSOLUTE MONO #: 0.5 K/UL (ref 0.1–1.2)
ANION GAP SERPL CALCULATED.3IONS-SCNC: 13 MMOL/L (ref 9–17)
BASOPHILS # BLD: 0 % (ref 0–2)
BASOPHILS ABSOLUTE: <0.03 K/UL (ref 0–0.2)
BNP SERPL-MCNC: 588 PG/ML
BUN SERPL-MCNC: 18 MG/DL (ref 8–23)
BUN/CREAT BLD: 21 (ref 9–20)
CALCIUM SERPL-MCNC: 8.5 MG/DL (ref 8.6–10.4)
CHLORIDE SERPL-SCNC: 101 MMOL/L (ref 98–107)
CO2 SERPL-SCNC: 23 MMOL/L (ref 20–31)
CREAT SERPL-MCNC: 0.86 MG/DL (ref 0.5–0.9)
EOSINOPHILS RELATIVE PERCENT: 2 % (ref 1–4)
GFR SERPL CREATININE-BSD FRML MDRD: >60 ML/MIN/1.73M2
GLUCOSE BLD-MCNC: 165 MG/DL (ref 65–105)
GLUCOSE BLD-MCNC: 188 MG/DL (ref 65–105)
GLUCOSE BLD-MCNC: 208 MG/DL (ref 65–105)
GLUCOSE SERPL-MCNC: 137 MG/DL (ref 70–99)
HCT VFR BLD AUTO: 37.7 % (ref 36.3–47.1)
HGB BLD-MCNC: 11.5 G/DL (ref 11.9–15.1)
IMMATURE GRANULOCYTES: 1 %
LYMPHOCYTES # BLD: 29 % (ref 24–43)
MAGNESIUM SERPL-MCNC: 1.7 MG/DL (ref 1.6–2.6)
MCH RBC QN AUTO: 26.9 PG (ref 25.2–33.5)
MCHC RBC AUTO-ENTMCNC: 30.5 G/DL (ref 28.4–34.8)
MCV RBC AUTO: 88.3 FL (ref 82.6–102.9)
MONOCYTES # BLD: 8 % (ref 3–12)
NRBC AUTOMATED: 0 PER 100 WBC
PDW BLD-RTO: 14.8 % (ref 11.8–14.4)
PLATELET # BLD AUTO: 185 K/UL (ref 138–453)
PMV BLD AUTO: 11.3 FL (ref 8.1–13.5)
POTASSIUM SERPL-SCNC: 3.3 MMOL/L (ref 3.7–5.3)
RBC # BLD: 4.27 M/UL (ref 3.95–5.11)
RBC # BLD: ABNORMAL 10*6/UL
SEG NEUTROPHILS: 60 % (ref 36–65)
SEGMENTED NEUTROPHILS ABSOLUTE COUNT: 3.64 K/UL (ref 1.5–8.1)
SODIUM SERPL-SCNC: 137 MMOL/L (ref 135–144)
TROPONIN I SERPL DL<=0.01 NG/ML-MCNC: 13 NG/L (ref 0–14)
TROPONIN I SERPL DL<=0.01 NG/ML-MCNC: 14 NG/L (ref 0–14)
WBC # BLD AUTO: 6.1 K/UL (ref 3.5–11.3)

## 2023-02-21 PROCEDURE — 2580000003 HC RX 258: Performed by: NURSE PRACTITIONER

## 2023-02-21 PROCEDURE — 85025 COMPLETE CBC W/AUTO DIFF WBC: CPT

## 2023-02-21 PROCEDURE — 97535 SELF CARE MNGMENT TRAINING: CPT

## 2023-02-21 PROCEDURE — 97110 THERAPEUTIC EXERCISES: CPT

## 2023-02-21 PROCEDURE — 84484 ASSAY OF TROPONIN QUANT: CPT

## 2023-02-21 PROCEDURE — 83880 ASSAY OF NATRIURETIC PEPTIDE: CPT

## 2023-02-21 PROCEDURE — 2500000003 HC RX 250 WO HCPCS: Performed by: FAMILY MEDICINE

## 2023-02-21 PROCEDURE — 6370000000 HC RX 637 (ALT 250 FOR IP): Performed by: NURSE PRACTITIONER

## 2023-02-21 PROCEDURE — 6370000000 HC RX 637 (ALT 250 FOR IP): Performed by: FAMILY MEDICINE

## 2023-02-21 PROCEDURE — 36415 COLL VENOUS BLD VENIPUNCTURE: CPT

## 2023-02-21 PROCEDURE — 83735 ASSAY OF MAGNESIUM: CPT

## 2023-02-21 PROCEDURE — 82947 ASSAY GLUCOSE BLOOD QUANT: CPT

## 2023-02-21 PROCEDURE — 6360000002 HC RX W HCPCS: Performed by: FAMILY MEDICINE

## 2023-02-21 PROCEDURE — 80048 BASIC METABOLIC PNL TOTAL CA: CPT

## 2023-02-21 PROCEDURE — 97530 THERAPEUTIC ACTIVITIES: CPT

## 2023-02-21 PROCEDURE — 97116 GAIT TRAINING THERAPY: CPT

## 2023-02-21 RX ORDER — POTASSIUM CHLORIDE 20 MEQ/1
20 TABLET, EXTENDED RELEASE ORAL DAILY
Qty: 10 TABLET | Refills: 0 | Status: SHIPPED | OUTPATIENT
Start: 2023-02-21 | End: 2023-03-03

## 2023-02-21 RX ADMIN — CYANOCOBALAMIN TAB 1000 MCG 1000 MCG: 1000 TAB at 07:56

## 2023-02-21 RX ADMIN — GLIPIZIDE 5 MG: 5 TABLET ORAL at 05:49

## 2023-02-21 RX ADMIN — FUROSEMIDE 20 MG: 10 INJECTION, SOLUTION INTRAMUSCULAR; INTRAVENOUS at 07:57

## 2023-02-21 RX ADMIN — ACETAMINOPHEN 650 MG: 325 TABLET ORAL at 11:09

## 2023-02-21 RX ADMIN — METFORMIN HYDROCHLORIDE 500 MG: 500 TABLET ORAL at 17:37

## 2023-02-21 RX ADMIN — POTASSIUM CHLORIDE 40 MEQ: 1500 TABLET, EXTENDED RELEASE ORAL at 12:57

## 2023-02-21 RX ADMIN — ANTI-FUNGAL POWDER MICONAZOLE NITRATE TALC FREE: 1.42 POWDER TOPICAL at 11:09

## 2023-02-21 RX ADMIN — METOPROLOL SUCCINATE 50 MG: 50 TABLET, EXTENDED RELEASE ORAL at 07:56

## 2023-02-21 RX ADMIN — SODIUM CHLORIDE, PRESERVATIVE FREE 10 ML: 5 INJECTION INTRAVENOUS at 07:56

## 2023-02-21 RX ADMIN — APIXABAN 5 MG: 5 TABLET, FILM COATED ORAL at 07:56

## 2023-02-21 RX ADMIN — SPIRONOLACTONE 25 MG: 25 TABLET ORAL at 07:56

## 2023-02-21 RX ADMIN — METFORMIN HYDROCHLORIDE 500 MG: 500 TABLET ORAL at 07:56

## 2023-02-21 RX ADMIN — FENOFIBRATE 160 MG: 160 TABLET ORAL at 07:56

## 2023-02-21 RX ADMIN — SERTRALINE 25 MG: 25 TABLET, FILM COATED ORAL at 07:56

## 2023-02-21 RX ADMIN — QUETIAPINE FUMARATE 25 MG: 25 TABLET ORAL at 17:37

## 2023-02-21 RX ADMIN — INSULIN LISPRO 4 UNITS: 100 INJECTION, SOLUTION INTRAVENOUS; SUBCUTANEOUS at 12:13

## 2023-02-21 NOTE — PLAN OF CARE
Problem: Discharge Planning  Goal: Discharge to home or other facility with appropriate resources  Outcome: Progressing  Flowsheets (Taken 2/21/2023 1659)  Discharge to home or other facility with appropriate resources:   Identify barriers to discharge with patient and caregiver   Identify discharge learning needs (meds, wound care, etc)   Refer to discharge planning if patient needs post-hospital services based on physician order or complex needs related to functional status, cognitive ability or social support system   Arrange for needed discharge resources and transportation as appropriate     Problem: Chronic Conditions and Co-morbidities  Goal: Patient's chronic conditions and co-morbidity symptoms are monitored and maintained or improved  Outcome: Progressing  Flowsheets (Taken 2/21/2023 1659)  Care Plan - Patient's Chronic Conditions and Co-Morbidity Symptoms are Monitored and Maintained or Improved:   Monitor and assess patient's chronic conditions and comorbid symptoms for stability, deterioration, or improvement   Collaborate with multidisciplinary team to address chronic and comorbid conditions and prevent exacerbation or deterioration   Update acute care plan with appropriate goals if chronic or comorbid symptoms are exacerbated and prevent overall improvement and discharge

## 2023-02-21 NOTE — PLAN OF CARE
Problem: Discharge Planning  Goal: Discharge to home or other facility with appropriate resources  Outcome: Progressing  Note: Patient will go  back home after this admit ans has 24 hour care. Problem: Pain  Goal: Verbalizes/displays adequate comfort level or baseline comfort level  Outcome: Progressing  Note: Patient has as needed pain control and will ask for it. Problem: Safety - Adult  Goal: Free from fall injury  Outcome: Progressing  Note: Patient will be free from falls this shift and is aware of personal limits. Problem: ABCDS Injury Assessment  Goal: Absence of physical injury  Outcome: Progressing  Note: Patient is free from injury this shift.

## 2023-02-21 NOTE — PROGRESS NOTES
Physical Therapy  Facility/Department: [facility]  Daily Treatment Note  NAME: Rah Stuart  : 1960  MRN: [id number]    Date of Service: 2023    Discharge Recommendations:     Due to recent hospitalization and medical condition, pt would benefit from additional intermittent skilled therapy at time of discharge. Please refer to the AM-PAC score for current functional status. Rah Stuart is a 60 y.o. female who presents with cough, chest pain. Patient resides in group home. Patiently admitted for SVT possible CHF. States she has had worsening cough chest pain occurring with the cough. Denies any shortness of breath. No oxygen requirement. Patient Diagnosis(es): The primary encounter diagnosis was Hypokalemia. Diagnoses of Acute upper respiratory infection, Congestive heart failure, unspecified HF chronicity, unspecified heart failure type ([address] and Chest pain, unspecified type were also pertinent to this visit. Assessment   Assessment: Pt agreeable to ambulate to the bathroom this date with mod Ax2 due to poor safety awareness, decreased endurance. Pt demos impulsive movements throughout requiring 2 person assist for safety. Pt with fair tolerance of ambulation, toilet transfer, and bed exercises this date. Pt will benefit from continued skilled therapy to improve independence. Activity Tolerance: Patient limited by fatigue;Treatment limited secondary to decreased cognition     Plan    Physcial Therapy Plan  General Plan: 5-7 times per week  Specific Instructions for Next Treatment: Co-Tx  Current Treatment Recommendations: Strengthening;ROM;Balance training;Functional mobility training;Transfer training;Gait training;Cognitive reorientation;Home exercise program;Safety education & training;Patient/Caregiver education & training; Therapeutic activities     Restrictions  Restrictions/Precautions  Restrictions/Precautions: Fall Risk, Up as Tolerated, General Precautions     Subjective   Subjective  Subjective: Pt insistent she does not want to go home. Pt resting supine in bed and agreeable to therapy.  Cognition  Overall Cognitive Status: Exceptions  Following Commands: Follows one step commands with repetition;Inconsistently follows commands  Memory: Decreased recall of biographical Information  Safety Judgement: Decreased awareness of need for assistance;Decreased awareness of need for safety  Problem Solving: Assistance required to identify errors made;Assistance required to correct errors made  Insights: Decreased awareness of deficits  Initiation: Requires cues for some  Sequencing: Requires cues for some  Cognition Comment: MRDD; moves impulsively at times     Objective   Vitals     Bed Mobility Training  Bed Mobility Training: Yes  Balance  Sitting: High guard (CGA EOB)  Standing: With support (mod A x2 for dynamic standing activities; Pt often takes one hand off walker to use grab bars requiring max verbal and manual cues to correct)  Transfer Training  Transfer Training: Yes  Overall Level of Assistance: Moderate assistance;Assist X2  Interventions: Safety awareness training;Verbal cues;Visual cues;Tactile cues (controlled STS, hand placement for RW)  Sit to Stand: Moderate assistance;Assist X2 (x1 failed attempt at STS; 4x successful STS throughout session)  Stand to Sit: Moderate assistance;Assist X2  Toilet Transfer: Moderate assistance;Assist X2 (Pt requires max cues to keep both hands on walker as pt tends to reach for grab bars with one hand, causing walker to tip to the other side.)  Gait Training  Gait Training: Yes  Gait  Overall Level of Assistance: Moderate assistance;Assist X2 (Max verbal and manual cues to keep RW close with poor demo.)  Interventions: Safety awareness training;Verbal cues;Tactile cues;Visual cues  Speed/Livia: Pace decreased (< 100 feet/min)  Step Length: Right shortened;Left shortened  Distance (ft): 12 Feet x 2 (12 feet to bathroom and back after  toileting.)  Assistive Device: Gait belt;Walker, rolling     PT Exercises  Exercise Treatment: Supine hip abduction 1x10 OLINDA; L SLR 1x15; R SAQ 1x15; L heel slides 1x15; shoulder flexion 1x10 OLINDA; shoulder abduction 1x10 OLINDA; elbow flexion 1x15 OLINDA  Circulation/Endurance Exercises: Supine ankle pumps x10 OLINDA     Safety Devices  Type of Devices: All fall risk precautions in place;Call light within reach;Gait belt;Patient at risk for falls; Left in bed;Nurse notified; Bed alarm in place (Left with OT to complete brief change)       Goals  Short Term Goals  Time Frame for Short Term Goals: 12 visits:  Short Term Goal 1: Pt. to be indep with bed mob. using bed rails and controls to assist as needed as pt has hospital bed at home  Short Term Goal 2: Pt. to be CGA for gait with RW, 25ft. Short Term Goal 3: Pt. to have good- standing dynamic balance with RW  Short Term Goal 4: Pt. to tolerate 25+ min. of PT daily for ther ex/ gait/balance training  Patient Goals   Patient Goals : wants to go home    Education  Patient Education  Education Given To: Patient  Education Provided: Role of Therapy;Plan of Care;Transfer Training; Fall Prevention Strategies  Education Provided Comments: See functional mobility section; walker safety  Education Method: Verbal;Demonstration; Other (Comment) (manual cues)  Barriers to Learning: Cognition  Education Outcome: Continued education needed    Therapy Time   Individual Concurrent Group Co-treatment   Time In [id number]         Time Out 1017         Minutes 37             Co-treatment with OT warranted secondary to decreased safety and independence requiring 2 skilled therapy professionals to address individual discipline's goals. LUIS CARBAJAL, SPT  Evaluation/treatment performed by Student PT under the supervision of co-signing PT who agrees with all evaluation/treatment and documentation.

## 2023-02-21 NOTE — DISCHARGE INSTR - COC
Continuity of Care Form    Patient Name: Colin Denis   :  1960  MRN:  [id number]    Admit date:  2023  Discharge date:  2023     Code Status Order: Full Code   Advance Directives:     Admitting Physician:  Angelo Darden MD  PCP: Iwona Myers MD    Discharging Nurse: [facility] Unit/Room#: 2105/2105-01  Discharging Unit Phone Number: [phone]    Emergency Contact:   Extended Emergency Contact Information  Primary Emergency Contact: Martine Shepard Phone: [phone]  Mobile Phone: [phone]  Relation: Brother/Sister  Secondary Emergency Contact: Crista Laureano  Mobile Phone: [phone]  Relation: None    Past Surgical History:  History reviewed. No pertinent surgical history.     Immunization History:   Immunization History   Administered Date(s) Administered    COVID-19, PFIZER PURPLE top, DILUTE for use, (age 15 y+), 30mcg/0.3mL 2021, 2021, 2022    Influenza A (T3Q4-19) Vaccine PF IM 11/10/2009    Influenza Virus Vaccine 2013, 2013, 2014, 2014    Influenza, AFLURIA (age 1 yrs+), FLUZONE, (age 10 mo+), MDV, 0.5mL 2019, 2020, 2021, 10/07/2022    Influenza, Triv, 3 Years and older, IM, PF (Afluria 5yrs and older) 2011    Pneumococcal Polysaccharide (Cyccmapsp54) 2019       Active Problems:  Patient Active Problem List   Diagnosis Code    New onset of congestive heart failure (HCC) I50.9    Paroxysmal supraventricular tachycardia (HCC) I47.1    Acute intractable tension-type headache G44.201    Post-concussion headache G44.309    Urinary tract infection without hematuria N39.0    Hypokalemia E87.6       Isolation/Infection:   Isolation            No Isolation          Patient Infection Status       Infection Onset Added Last Indicated Last Indicated By Review Planned Expiration Resolved Resolved By    None active    Resolved    COVID-19 (Rule Out) 23 COVID-19, Rapid (Ordered) 02/19/23 Rule-Out Test Resulted            Nurse Assessment:  Last Vital Signs: /64   Pulse 88   Temp 98.1 °F (36.7 °C) (Oral)   Resp 18   Wt 222 lb 14.4 oz (101.1 kg)   SpO2 95%   BMI 31.09 kg/m²     Last documented pain score (0-10 scale): Pain Level: 0  Last Weight:   Wt Readings from Last 1 Encounters:   02/21/23 222 lb 14.4 oz (101.1 kg)     Mental Status:  oriented and alert    IV Access:  - None    Nursing Mobility/ADLs:  Walking   Assisted  Transfer  Assisted  Bathing  Assisted  Dressing  Assisted  Toileting  Assisted  Feeding  Independent  Med Admin  Assisted  Med Delivery   whole    Wound Care Documentation and Therapy:        Elimination:  Continence: Bowel: No  Bladder: No  Urinary Catheter: None   Colostomy/Ileostomy/Ileal Conduit: No       Date of Last BM: 2/21/2023     Intake/Output Summary (Last 24 hours) at 2/21/2023 1655  Last data filed at 2/21/2023 0439  Gross per 24 hour   Intake --   Output 1000 ml   Net -1000 ml     I/O last 3 completed shifts: In: 999 [I.V.:999]  Out: 1560 [Urine:1560]    Safety Concerns: At Risk for Falls    Impairments/Disabilities:      Cognitive     Nutrition Therapy:  Current Nutrition Therapy:   - Oral Diet:  Carb Control 4 carbs/meal (1800kcals/day)    Routes of Feeding: Oral  Liquids: No Restrictions  Daily Fluid Restriction: no  Last Modified Barium Swallow with Video (Video Swallowing Test): not done    Treatments at the Time of Hospital Discharge:   Respiratory Treatments: na  Oxygen Therapy:  is not on home oxygen therapy.   Ventilator:    - No ventilator support    Rehab Therapies: Physical Therapy and Occupational Therapy  Weight Bearing Status/Restrictions: No weight bearing restrictions  Other Medical Equipment (for information only, NOT a DME order):  {EQUIPMENT:[id number]}  Other Treatments: skilled nursing     Patient's personal belongings (please select all that are sent with patient):  None    RN SIGNATURE:  Electronically signed by Marlen Tran RN on 2/21/23 at 5:30 PM EST    CASE MANAGEMENT/SOCIAL WORK SECTION    Inpatient Status Date: ***    Readmission Risk Assessment Score:  Readmission Risk              Risk of Unplanned Readmission:  26           Discharging to Facility/ Agency   Name:   Address:  Phone:  Fax:    Dialysis Facility (if applicable)   Name:  Address:  Dialysis Schedule:  Phone:  Fax:    / signature: {Esignature:[id number]}    PHYSICIAN SECTION    Prognosis: Good    Condition at Discharge: Stable    Rehab Potential (if transferring to Rehab): Good    Recommended Labs or Other Treatments After Discharge:     Physician Certification: I certify the above information and transfer of Crescencio Win  is necessary for the continuing treatment of the diagnosis listed and that she requires Home Care for less 30 days.      Update Admission H&P: No change in H&P    PHYSICIAN SIGNATURE:  Electronically signed by Daiana Dean MD on 2/21/23 at 4:55 PM EST

## 2023-02-21 NOTE — PROGRESS NOTES
Occupational Therapy  Facility/Department: [facility]  Daily Treatment Note  NAME: Ann-Marie Miller  : 1960  MRN: [id number]    Date of Service: 2023    HELLEN Benavides reports patient is medically stable for therapy treatment this date. Chart reviewed prior to treatment and patient is agreeable for therapy. All lines intact and patient positioned comfortably at end of treatment. All patient needs addressed prior to ending therapy session. Due to recent hospitalization and medical condition, pt would benefit from additional intermittent skilled therapy at time of discharge. Please refer to the AM-PAC score for current functional status. Discharge Recommendations:  Patient would benefit from continued therapy after discharge, 24 hour supervision or assist  OT Equipment Recommendations  Equipment Needed:  (CTA)      Patient Diagnosis(es): The primary encounter diagnosis was Hypokalemia. Diagnoses of Acute upper respiratory infection, Congestive heart failure, unspecified HF chronicity, unspecified heart failure type ([address] and Chest pain, unspecified type were also pertinent to this visit. Assessment    Assessment: Pt had Fair tolerance for session this date and is progressing towards goals. Pt completed ADL transfers and functional mobility tasks w/ ModAx2 and RW. Facilitated LB ADLs w/ pt during session, w/ pt requiring max-total assist d/t cognitive deficits, decreased static/dynamic balance, and generalized weakness. Continued skilled OT services are indicated at this time to maximize this pt's safety and IND with self care tasks and to facilitate safe return to PLOF as able.   Activity Tolerance: Patient limited by fatigue;Treatment limited secondary to decreased cognition  Discharge Recommendations: Patient would benefit from continued therapy after discharge;24 hour supervision or assist  Equipment Needed:  (CTA)      Plan   Occupational Therapy Plan  Times Per Week: 4-5x/week  Current Treatment Recommendations: Strengthening;Balance training; Safety education & training;Functional mobility training;Self-Care / ADL; Patient/Caregiver education & training; Endurance training;Equipment evaluation, education, & procurement; Modalities; Positioning     Restrictions  Restrictions/Precautions  Restrictions/Precautions: Fall Risk;Up as Tolerated;General Precautions  Position Activity Restriction  Other position/activity restrictions: RUE IV, external urinary catheter    Subjective   Subjective  Subjective: Pt supine in bed upon entry to room, agreeable to participation in therapy and motivated to walk in hospital room. Pt w/ no c/o pain at rest.  Cognition  Overall Cognitive Status: Exceptions  Arousal/Alertness: Appropriate responses to stimuli  Following Commands: Follows one step commands with repetition; Inconsistently follows commands; Follows one step commands with increased time  Attention Span: Attends with cues to redirect  Memory: Decreased recall of biographical Information;Decreased short term memory  Safety Judgement: Decreased awareness of need for assistance;Decreased awareness of need for safety  Problem Solving: Assistance required to identify errors made;Assistance required to correct errors made;Decreased awareness of errors  Insights: Decreased awareness of deficits  Initiation: Requires cues for some  Sequencing: Requires cues for some  Cognition Comment: MRDD; moves impulsively at times        Objective    Bed Mobility Training  Bed Mobility Training: Yes  Overall Level of Assistance: Contact-guard assistance  Interventions: Safety awareness training;Verbal cues (Min cues for pacing self, upright posture, squaring hips to EOB and pursed lip breathing tech all to increase overall safety.)  Rolling: Contact-guard assistance  Supine to Sit: Contact-guard assistance  Sit to Supine: Contact-guard assistance  Scooting: Minimum assistance    Balance  Sitting: High guard (CGA while seated at EOB)  Standing: With support (ModAx2 w/ RW)    Transfer Training  Transfer Training: Yes  Overall Level of Assistance: Moderate assistance;Assist X2 (Pt w/ Poor safety awareness w/ STS transfer, including use of RW. Education provided to increase safety w/ STS transfer tech, w/ Poor return demo d/t cognitive deficits.)  Interventions: Safety awareness training;Verbal cues; Visual cues; Tactile cues (Pt required continuous cues for pacing and safe use of RW throughout in order to maintain safety, as pt moved impulsively and had Poor safety awareness in function.)  Sit to Stand: Moderate assistance;Assist X2  Stand to Sit: Moderate assistance;Assist X2  Toilet Transfer: Moderate assistance;Assist X2    Functional Mobility  Overall Level of Assistance: Moderate assistance;Assist X2 (Pt completed functional mobility, EOB>toilet and then toilet>EOB, w/ ModAx2 and RW. Pt required Max cues to maintain JENNIFER within RW, pace self, scan the environment, and maintain upright posture, all to increase safety.)  Interventions: Safety awareness training;Verbal cues; Tactile cues; Visual cues  Assistive Device: Gait belt;Walker, rolling     ADL  UE Bathing: Moderate assistance;Verbal cueing  UE Bathing Skilled Clinical Factors: To complete sponge bathing tasks while seated at EOB; pt required cues to initiate tasks and for thoroughness  UE Dressing: Moderate assistance  UE Dressing Skilled Clinical Factors: To doff soiled gown and down clean gown while seated at EOB  LE Dressing: Dependent/Total  LE Dressing Skilled Clinical Factors: Pt w/ noted soiled brief upon standing; MaxA provided to doff brief d/t messiness from BM and then total assist provided to don clean brief while seated on toilet d/t pt limited dynamic seated balance. Pt required x1 assist to maintain standing balance and x1 assist to manage LB clothing up over hips.   Toileting: Dependent/Total  Toileting Skilled Clinical Factors: Purewick catheter in place; total assist for clothing mgnt and personal hygiene of yamini area and bottom s/p messy BM  Additional Comments: Pt's participation in ADLs limited d/t generalized weakness, decreased functional activity tolerance, impulsivity, and decreased cognition. Following toileting tasks, pt's coccyx area w/ noted redness and skin tear - RN notifed and present in room to assess and bandage. Education provided to pt on incentive spirometer use, w/ pt demo'ing Fair return of education. Safety Devices  Type of Devices: All fall risk precautions in place;Call light within reach;Gait belt;Patient at risk for falls; Left in bed;Nurse notified; Bed alarm in place     Patient Education  Education Given To: Patient  Education Provided: Role of Therapy;Plan of Care;Precautions; ADL Adaptive Strategies;Transfer Training;Energy Conservation; Fall Prevention Strategies; Equipment; Family Education  Education Provided Comments: bed mobility tech, STS transfer tech, RW safety/mgnt, ADL strategies, pressure relief strategies, EC/WS tech, pursed lip breathing tech, incentive spirometer use, safety in function, call light use  Education Method: Verbal;Demonstration; Teach Back  Barriers to Learning: Cognition  Education Outcome: Continued education needed; Unable to verbalize; Unable to demonstrate understanding    AM-PAC: 11/24    Goals  Short Term Goals  Time Frame for Short Term Goals: by discharge, pt will  Short Term Goal 1: demo CGA/ min A with ADL transfers with approp AD/DME and min cues for good safety  Short Term Goal 2: demo min A with functional mob in room distances with min cues for good safety, pacing for ADL completion  Short Term Goal 3: demo min A with toileting routine with DME as needed  Short Term Goal 4: demo SBA with grooming tasks following set up with min cues for initiation/sequencing  Short Term Goal 5: demo min A with UB ADLs and mod A with LB ADLs with AE/DME as needed with min cues for initiation/sequencing  Patient Goals Patient goals : to go back home       Therapy Time   Individual Concurrent Group Co-treatment   Time In [address]         Time Out 5387         Minutes 54           Co-treatment with PT warranted secondary to decreased safety and independence requiring 2 skilled therapy professionals to address individual discipline's goals. OT addressing preparation for ADL transfer, sitting balance for increased ADL performance, sitting/activity tolerance, functional reaching, environmental safety/scanning, fall prevention, functional mobility for ADL transfers, ability to sequence and follow directions, bed mobility tech, and functional UE strength.         Tai Medellin, OT

## 2023-02-21 NOTE — PROGRESS NOTES
Pt discharged to home with caregiver. No distress noted. Caregiver given instructions and advised to stop at pharmacy for script.

## 2023-02-21 NOTE — PROGRESS NOTES
[facility] NOTE    Room # 2105/2105-01   Name: Fermín Hardin              Reason for visit: Routine    I visited the patient. Admit Date & Time: 2/19/2023  7:16 PM    Assessment:  Fermín Hardin is a 60 y.o. female. Upon entering the room patient was sleeping. Intervention:   provided a ministry presence and brief prayer. Outcome:  Patient did not respond. Plan:  Chaplains will remain available to offer spiritual and emotional support as needed. Electronically signed by Chaplain Cristy, on 2/21/2023 at 2:53 PM.  Betty      02/21/23 1452   Encounter Summary   Service Provided For: Patient   Referral/Consult From: [facility]   Support System Unknown   Last Encounter  02/21/23   Complexity of Encounter Low   Begin Time 0148   End Time  0150   Total Time Calculated 2 min   Encounter    Type Initial Screen/Assessment   Assessment/Intervention/Outcome   Assessment Unable to assess   Intervention Prayer (assurance of)/[place];Sustaining Presence/Ministry of presence

## 2023-02-21 NOTE — DISCHARGE SUMMARY
[facility]    Adult Hospitalist      Patient ID: Kulwinder Anglin  MRN: [id number]     Acct:  [de-identified]       Patient's PCP: Lawana Collet, MD    Admit Date: 2/19/2023     Discharge Date: 2/21/2023      Admitting Physician: Peter Gant MD    Discharge Physician: Srikanth Segal MD     CONSULTANTS: Patient Care Team:  Lawana Collet, MD as PCP - General    PROCEDURES PERFORMED:     Active Discharge Diagnoses:  Chest pain  Elevated troponin  Paroxysmal supraventricular tachycardia-Eliquis  Hypertension  Hyperlipidemia  Diabetes type 2  History of CVA  Cognitive impairment      Primary Problem  Hypokalemia    Hospital Course:   71-year-old lady with past medical history of CVA, hypertension, hyperlipidemia, diabetes, PAT presented to ER complaining of cough and chest pain. Patient has cognitive impairment/poor historian. She denies any fever, chills, nausea, vomiting, changes in urination, habit or rash. On presentation potassium was 2.8. Hemoglobin was 10.9. Troponin was 15. Patient was admitted for further management. Seen and evaluated by Cardiology. Eval by cardiology they recommended no ischemic work-up, overall patient is feeling better with IV Lasix discharged on home dose of Lasix initial admission was for hypokalemia . Patient received IV diuretics. Patient discharged on oral Lasix 40 mg daily and p.o. potassium  Patient is continued on home dose of Aldactone. BMP is ordered to be done in 3 days    The plan was discussed in detail with patient who agreed with the plan and verbalized understanding . The patient was seen and examined on day of discharge and this discharge summary is in conjunction with any daily progress note from day of discharge.     Hospital Data:    Labs:    Hematology:  Recent Labs     02/21/23  0613   WBC 6.1   RBC 4.27   HGB 11.5*   HCT 37.7   MCV 88.3   MCH 26.9   MCHC 30.5   RDW 14.8*      MPV 11.3     Chemistry:  Recent Labs     02/20/23  1757 02/21/23  0226 02/21/23  0613 02/21/23  1039   NA  --   --  137  --    K  --   --  3.3*  --    CL  --   --  101  --    CO2  --   --  23  --    GLUCOSE  --   --  137*  --    BUN  --   --  18  --    CREATININE  --   --  0.86  --    MG  --   --  1.7  --    ANIONGAP  --   --  13  --    LABGLOM  --   --  >60  --    CALCIUM  --   --  8.5*  --    PROBNP  --   --   --  588*   TROPHS 15* 14  --  13     Recent Labs     02/20/23  1149 02/20/23  1607 02/20/23 2014 02/21/23  0617 02/21/23  1156 02/21/23  1645   POCGLU 288* 165* 274* 188* 208* 165*     Lab Results   Component Value Date    INR 1.7 02/05/2023    INR 1.1 08/24/2012    PROTIME 19.9 (H) 02/05/2023    PROTIME 11.4 08/24/2012     Lab Results   Component Value Date/Time    SPECIAL NOT REPORTED 08/29/2012 06:42 PM     Lab Results   Component Value Date/Time    CULTURE ESCHERICHIA COLI >998505 CFU/ML (A) 02/07/2023 06:24 PM       No results found for: POCPH, PHART, PH, POCPCO2, RNJ9LUI, PCO2, POCPO2, PO2ART, PO2, POCHCO3, BTG1VCW, HCO3, NBEA, PBEA, BEART, BE, THGBART, THB, XVX8ADQ, JGQD0WYB, Q0EQGRHD, O2SAT, FIO2    Radiology:    XR CHEST PORTABLE    Result Date: 2/19/2023  No acute abnormality. All radiological studies reviewed      Reviews of Symptoms:    A 10 point system is reviewed and  negative except described in hospital course    Physical Exam:    Vitals:  /64   Pulse 88   Temp 98.1 °F (36.7 °C) (Oral)   Resp 18   Wt 222 lb 14.4 oz (101.1 kg)   SpO2 95%   BMI 31.09 kg/m²   No data recorded.       General appearance - alert, well appearing, and in no acute distress  Mental status - oriented to person, place, and time with normal affect  Head - normocephalic and atraumatic  Eyes - pupils equal and reactive, extraocular eye movements intact, conjunctiva clear  Ears - hearing appears to be intact  Nose - no drainage noted  Mouth - mucous membranes moist  Neck - supple, no carotid bruits, thyroid not palpable  Chest - clear to auscultation, normal effort  Heart - normal rate, regular rhythm, no murmur  Abdomen - soft, nontender, nondistended, bowel sounds present all four quadrants, no masses, hepatomegaly or splenomegaly  Neurological - normal speech, no focal findings or movement disorder noted, cranial nerves II through XII grossly intact  Extremities - peripheral pulses palpable, no pedal edema or calf pain with palpation  Skin - no gross lesions, rashes, or induration noted      Consults:  IP CONSULT TO HOSPITALIST  IP CONSULT TO CARDIOLOGY    Disposition: Home care    Discharged Condition: Stable    Follow Up: Renan Earl MD  [address] [place] [address]    Follow up in 1 week(s)      Ramu Vale MD  [place] [address]  [phone]    Schedule an appointment as soon as possible for a visit in 2 week(s)      Lab Frequency Next Occurrence   Basic Metabolic Panel Once 61/35/5686   Up with assistance PRN    Intake and output EVERY 8 HOURS    Initiate Oxygen Therapy Protocol PRN    Pulse Oximetry Spot Check PRN    Basic Metabolic Panel w/ Reflex to MG Daily (Lab)    CBC with Auto Differential Daily (Lab)    Magnesium Daily (Lab)    HYPOGLYCEMIA TREATMENT: blood glucose less than 70 mg/dL and patient ALERT and TOLERATING PO PRN    HYPOGLYCEMIA TREATMENT: blood glucose less than 70 mg/dL and patient NOT ALERT or NPO PRN    POCT Glucose PRN    POCT Glucose 4 TIMES DAILY BEFORE MEALS & AT BEDTIME    POCT glucose 4 TIMES DAILY BEFORE MEALS & AT BEDTIME    POCT glucose - If patient is NPO, TPN, or continuous tube feed and on HumaLOG NOW THEN EVERY 4 HOURS    HYPOGLYCEMIA TREATMENT: blood glucose LESS THAN 70 mg/dL and patient ALERT and TOLERATING PO PRN    HYPOGLYCEMIA TREATMENT: blood glucose LESS THAN 70 mg/dL and patient NOT ALERT or NPO PRN    POCT Glucose PRN          Diet: No diet orders on file    Discharge Medications:      Medication List        START taking these medications potassium chloride 20 MEQ extended release tablet  Commonly known as: KLOR-CON M  Take 1 tablet by mouth daily for 10 days            CONTINUE taking these medications      apixaban 5 MG Tabs tablet  Commonly known as: ELIQUIS     atorvastatin 20 MG tablet  Commonly known as: LIPITOR     cyanocobalamin 1000 MCG tablet     fenofibrate 145 MG tablet  Commonly known as: TRICOR     furosemide 40 MG tablet  Commonly known as: LASIX  Take 1 tablet by mouth daily     glimepiride 4 MG tablet  Commonly known as: AMARYL     hydrOXYzine HCl 25 MG tablet  Commonly known as: ATARAX     metFORMIN 500 MG tablet  Commonly known as: GLUCOPHAGE     methenamine 1 g tablet  Commonly known as: HIPREX     metoprolol succinate 50 MG extended release tablet  Commonly known as: TOPROL XL     Myrbetriq 25 MG Tb24  Generic drug: mirabegron     NovoLOG FlexPen 100 UNIT/ML injection pen  Generic drug: insulin aspart     QUEtiapine 25 MG tablet  Commonly known as: SEROQUEL     sertraline 25 MG tablet  Commonly known as: ZOLOFT     spironolactone 25 MG tablet  Commonly known as: ALDACTONE               Where to Get Your Medications        These medications were sent to [address] [place] [address] & [address] [facility] [phone]  [address] [place], [facility] [place] [address]      Phone: [phone]   potassium chloride 20 MEQ extended release tablet         Code Status:  Prior    Time Spent on discharge is  35 mins in patient examination, evaluation, counseling as well as medication reconciliation, prescriptions for required medications, discharge plan and follow up. Electronically signed by Sadia Swan MD on 2/23/2023 at 11:15 AM     Thank you Dr. Tavon Powell MD for the opportunity to be involved in this patient's care.     This note was created with the assistance of a speech-recognition program.  Although the intention is to generate a document that actually reflects the content of the visit, no guarantees can be provided that every mistake has been identified and corrected by editing. Note was updated later by me after  physical examination and  completion of the assessment.

## 2023-02-21 NOTE — PROGRESS NOTES
Progress Note    Patient Name:  Rah Stuart    :  1960 10:49 AM      SUBJECTIVE       Ms. Willa Chandler  states that she does not have chest pain but feels pain when coughing. She nods to breathing comfortably and is overall feeling better than when she first came in. OBJECTIVE     Vital signs:    /66   Pulse 92   Temp 98.3 °F (36.8 °C) (Oral)   Resp 18   Wt 222 lb 14.4 oz (101.1 kg)   SpO2 95%   BMI 31.09 kg/m²         Admit Weight:  222 lb 14.4 oz (101.1 kg)    Last 3 weights: Wt Readings from Last 3 Encounters:   23 222 lb 14.4 oz (101.1 kg)   02/10/23 262 lb 6.4 oz (119 kg)   23 200 lb (90.7 kg)       BMI: Body mass index is 31.09 kg/m². Input/Output:       Intake/Output Summary (Last 24 hours) at 2023 1049  Last data filed at 2023 0439  Gross per 24 hour   Intake --   Output 1560 ml   Net -1560 ml         Exam:     General appearance: awake and alert moves all ext   Lungs: no rhonchi, no wheezes, no rales  Heart: S1 and S2 no murmur  Abdomen: positive bowel sounds, no bruits, no masses  Extremities: warm and dry, no cyanosis, no clubbing        Laboratory Studies:     CBC:   Recent Labs     23  0555 23  0613   WBC 7.7 5.8 6.1   HGB 12.0 10.9* 11.5*   HCT 38.4 35.6* 37.7   MCV 86.9 87.9 88.3    167 185     BMP:   Recent Labs     23  0555 23  0613    137 137   K 2.8* 3.7 3.3*   CL 95* 102 101   CO2 27 25 23   BUN 22 18 18   CREATININE 0.93* 0.81 0.86     PT/INR: No results for input(s): PROTIME, INR in the last 72 hours. APTT: No results for input(s): APTT in the last 72 hours. MAG:   Recent Labs     23  1923 23  0555 23  0613   MG 1.3* 1.8 1.7     D Dimer: No results for input(s): DDIMER in the last 72 hours. Troponin  No results for input(s): TROPONINI in the last 72 hours. No results for input(s): TROPONINT in the last 72 hours.    BNP No results for input(s): BNP in the last 72 hours. Recent Labs     23   PROBNP 495*         Pulse Ox: SpO2  Av %  Min: 93 %  Max: 95 %  Supplemental O2:       Current Meds:    miconazole   Topical BID    apixaban  5 mg Oral BID    atorvastatin  20 mg Oral Nightly    cyanocobalamin  1,000 mcg Oral Daily    fenofibrate  160 mg Oral Daily    glipiZIDE  5 mg Oral QAM AC    metFORMIN  500 mg Oral BID WC    metoprolol succinate  50 mg Oral Daily    QUEtiapine  25 mg Oral QPM    sertraline  25 mg Oral Daily    spironolactone  25 mg Oral Daily    methenamine  1 g Oral BID WC    trospium  20 mg Oral Nightly    furosemide  20 mg IntraVENous BID    insulin lispro  0-16 Units SubCUTAneous TID WC    insulin lispro  0-4 Units SubCUTAneous Nightly    sodium chloride flush  5-40 mL IntraVENous 2 times per day     Continuous Infusions:    dextrose      sodium chloride      dextrose              ASSESSMENT     Chest pain  Chronic Heart failure with preserved EF per TTE 2023  Normal coronary arteries per [facility] 2019  Type II DM  Developmental delay  History of CVA  Hypokalemia    PLAN     Patient without any further episodes of chest pain. Troponin trended flat with high sensitivity at 13 this morning. BN{P was 588. Agree with continuing IV diuresis but no plans for ischemic evaluation at this time as patient is feeling better with diuresis. Replace Potassium.

## 2023-02-21 NOTE — CARE COORDINATION
Discharge planning    CHF. Pt. is MRDD. Pt lives with her brother who is MRDD as well and they have aide 24/7 from [facility]  Also has [facility]. Has all DME that is needed, in the home. Dianna Thapa, patient's sister, takes care of the patient's needs. Continue to follow.  PT/OT to monique.

## 2023-02-28 ENCOUNTER — HOSPITAL ENCOUNTER (INPATIENT)
Age: 63
LOS: 9 days | Discharge: NURSING, REHAB OR LONG-TERM CARE | DRG: 064 | End: 2023-03-09
Attending: EMERGENCY MEDICINE | Admitting: FAMILY MEDICINE
Payer: MEDICARE

## 2023-02-28 DIAGNOSIS — R53.1 GENERALIZED WEAKNESS: Primary | ICD-10-CM

## 2023-02-28 LAB
ABSOLUTE EOS #: <0.03 K/UL (ref 0–0.44)
ABSOLUTE IMMATURE GRANULOCYTE: 0.46 K/UL (ref 0–0.3)
ABSOLUTE LYMPH #: 1.68 K/UL (ref 1.1–3.7)
ABSOLUTE MONO #: 1.21 K/UL (ref 0.1–1.2)
ANION GAP SERPL CALCULATED.3IONS-SCNC: 13 MMOL/L (ref 9–17)
BASOPHILS # BLD: 0 % (ref 0–2)
BASOPHILS ABSOLUTE: 0.06 K/UL (ref 0–0.2)
BUN SERPL-MCNC: 37 MG/DL (ref 8–23)
BUN/CREAT BLD: 31 (ref 9–20)
CALCIUM SERPL-MCNC: 9.3 MG/DL (ref 8.6–10.4)
CHLORIDE SERPL-SCNC: 94 MMOL/L (ref 98–107)
CO2 SERPL-SCNC: 23 MMOL/L (ref 20–31)
CREAT SERPL-MCNC: 1.2 MG/DL (ref 0.5–0.9)
EOSINOPHILS RELATIVE PERCENT: 0 % (ref 1–4)
GFR SERPL CREATININE-BSD FRML MDRD: 51 ML/MIN/1.73M2
GLUCOSE SERPL-MCNC: 292 MG/DL (ref 70–99)
HCT VFR BLD AUTO: 44.9 % (ref 36.3–47.1)
HGB BLD-MCNC: 14.4 G/DL (ref 11.9–15.1)
IMMATURE GRANULOCYTES: 3 %
LYMPHOCYTES # BLD: 9 % (ref 24–43)
MCH RBC QN AUTO: 27 PG (ref 25.2–33.5)
MCHC RBC AUTO-ENTMCNC: 32.1 G/DL (ref 28.4–34.8)
MCV RBC AUTO: 84.2 FL (ref 82.6–102.9)
MONOCYTES # BLD: 7 % (ref 3–12)
NRBC AUTOMATED: 0 PER 100 WBC
PDW BLD-RTO: 14.6 % (ref 11.8–14.4)
PLATELET # BLD AUTO: 198 K/UL (ref 138–453)
PMV BLD AUTO: 9.9 FL (ref 8.1–13.5)
POTASSIUM SERPL-SCNC: 4.1 MMOL/L (ref 3.7–5.3)
RBC # BLD: 5.33 M/UL (ref 3.95–5.11)
RBC # BLD: ABNORMAL 10*6/UL
SEG NEUTROPHILS: 81 % (ref 36–65)
SEGMENTED NEUTROPHILS ABSOLUTE COUNT: 14.62 K/UL (ref 1.5–8.1)
SODIUM SERPL-SCNC: 130 MMOL/L (ref 135–144)
WBC # BLD AUTO: 18 K/UL (ref 3.5–11.3)

## 2023-02-28 PROCEDURE — 85025 COMPLETE CBC W/AUTO DIFF WBC: CPT

## 2023-02-28 PROCEDURE — 2580000003 HC RX 258: Performed by: EMERGENCY MEDICINE

## 2023-02-28 PROCEDURE — 1200000000 HC SEMI PRIVATE

## 2023-02-28 PROCEDURE — 99285 EMERGENCY DEPT VISIT HI MDM: CPT

## 2023-02-28 PROCEDURE — 80048 BASIC METABOLIC PNL TOTAL CA: CPT

## 2023-02-28 PROCEDURE — 96360 HYDRATION IV INFUSION INIT: CPT

## 2023-02-28 RX ORDER — SODIUM CHLORIDE 0.9 % (FLUSH) 0.9 %
5-40 SYRINGE (ML) INJECTION EVERY 12 HOURS SCHEDULED
Status: DISCONTINUED | OUTPATIENT
Start: 2023-02-28 | End: 2023-03-09 | Stop reason: HOSPADM

## 2023-02-28 RX ORDER — MAGNESIUM SULFATE 1 G/100ML
1000 INJECTION INTRAVENOUS PRN
Status: DISCONTINUED | OUTPATIENT
Start: 2023-02-28 | End: 2023-03-09 | Stop reason: HOSPADM

## 2023-02-28 RX ORDER — SODIUM CHLORIDE 9 MG/ML
INJECTION, SOLUTION INTRAVENOUS PRN
Status: DISCONTINUED | OUTPATIENT
Start: 2023-02-28 | End: 2023-03-09 | Stop reason: HOSPADM

## 2023-02-28 RX ORDER — SODIUM CHLORIDE 0.9 % (FLUSH) 0.9 %
10 SYRINGE (ML) INJECTION PRN
Status: DISCONTINUED | OUTPATIENT
Start: 2023-02-28 | End: 2023-03-09 | Stop reason: HOSPADM

## 2023-02-28 RX ORDER — 0.9 % SODIUM CHLORIDE 0.9 %
1000 INTRAVENOUS SOLUTION INTRAVENOUS ONCE
Status: COMPLETED | OUTPATIENT
Start: 2023-02-28 | End: 2023-02-28

## 2023-02-28 RX ORDER — POTASSIUM CHLORIDE 20 MEQ/1
40 TABLET, EXTENDED RELEASE ORAL PRN
Status: DISCONTINUED | OUTPATIENT
Start: 2023-02-28 | End: 2023-03-09 | Stop reason: HOSPADM

## 2023-02-28 RX ORDER — ACETAMINOPHEN 325 MG/1
650 TABLET ORAL EVERY 6 HOURS PRN
Status: DISCONTINUED | OUTPATIENT
Start: 2023-02-28 | End: 2023-03-03 | Stop reason: SDUPTHER

## 2023-02-28 RX ORDER — ONDANSETRON 4 MG/1
4 TABLET, ORALLY DISINTEGRATING ORAL EVERY 8 HOURS PRN
Status: DISCONTINUED | OUTPATIENT
Start: 2023-02-28 | End: 2023-03-09 | Stop reason: HOSPADM

## 2023-02-28 RX ORDER — POLYETHYLENE GLYCOL 3350 17 G/17G
17 POWDER, FOR SOLUTION ORAL DAILY PRN
Status: DISCONTINUED | OUTPATIENT
Start: 2023-02-28 | End: 2023-03-09 | Stop reason: HOSPADM

## 2023-02-28 RX ORDER — ACETAMINOPHEN 650 MG/1
650 SUPPOSITORY RECTAL EVERY 6 HOURS PRN
Status: DISCONTINUED | OUTPATIENT
Start: 2023-02-28 | End: 2023-03-03 | Stop reason: SDUPTHER

## 2023-02-28 RX ORDER — ONDANSETRON 2 MG/ML
4 INJECTION INTRAMUSCULAR; INTRAVENOUS EVERY 6 HOURS PRN
Status: DISCONTINUED | OUTPATIENT
Start: 2023-02-28 | End: 2023-03-09 | Stop reason: HOSPADM

## 2023-02-28 RX ORDER — POTASSIUM CHLORIDE 7.45 MG/ML
10 INJECTION INTRAVENOUS PRN
Status: DISCONTINUED | OUTPATIENT
Start: 2023-02-28 | End: 2023-03-09 | Stop reason: HOSPADM

## 2023-02-28 RX ADMIN — SODIUM CHLORIDE 1000 ML: 9 INJECTION, SOLUTION INTRAVENOUS at 20:09

## 2023-02-28 ASSESSMENT — ENCOUNTER SYMPTOMS
SHORTNESS OF BREATH: 0
EYE DISCHARGE: 0
EYE REDNESS: 0
SORE THROAT: 0
VOMITING: 0
RHINORRHEA: 0
COUGH: 0
NAUSEA: 0
DIARRHEA: 0
COLOR CHANGE: 0

## 2023-02-28 NOTE — ED PROVIDER NOTES
EMERGENCY DEPARTMENT ENCOUNTER    Pt Name: Brisa Calvert  MRN: [id number]  [place] 1960  Date of evaluation: 2/28/23  CHIEF COMPLAINT       Chief Complaint   Patient presents with    Fatigue     Unable to walk or stand. Discharged from [facility] today     HISTORY OF PRESENT ILLNESS   This is a 34-year-old female that presents with complaints of generalized weakness. Patient has caregivers at home, she had been admitted to [facility] recently for both upper respiratory infection as well as cardiac arrhythmia. The patient was discharged home today. The patient was significantly weak and was not able to ambulate into the house, EMS was called, because [facility] was an EMS bypass the patient was brought here for further evaluation. The patient does not really provide anything to the clinical history. Family of her provide much of the history. I reviewed the patient's records from [facility].         REVIEW OF SYSTEMS     Review of Systems   Constitutional:  Negative for chills and fever. HENT:  Negative for rhinorrhea and sore throat. Eyes:  Negative for discharge, redness and visual disturbance. Respiratory:  Negative for cough and shortness of breath. Cardiovascular:  Negative for chest pain, palpitations and leg swelling. Gastrointestinal:  Negative for diarrhea, nausea and vomiting. Musculoskeletal:  Negative for arthralgias, myalgias and neck pain. Skin:  Negative for color change and rash. Neurological:  Positive for weakness. Negative for seizures and headaches. Psychiatric/Behavioral:  Negative for hallucinations, self-injury and suicidal ideas.     PASTMEDICAL HISTORY     Past Medical History:   Diagnosis Date    Cerebral artery occlusion with cerebral infarction [address] [address]     Diabetes mellitus ([facility] [address]     HTN (hypertension)     Mental disability     Pulmonary emboli (HCC)     SVT (supraventricular tachycardia) [facility])      Past Problem List  Patient Active Problem List   Diagnosis Code    New onset of congestive heart failure (HCC) I50.9    Paroxysmal supraventricular tachycardia (HCC) I47.1    Acute intractable tension-type headache G44.201    Post-concussion headache G44.309    Urinary tract infection without hematuria N39.0    Hypokalemia E87.6     SURGICAL HISTORY     No past surgical history on file. CURRENT MEDICATIONS       Previous Medications    APIXABAN (ELIQUIS) 5 MG TABS TABLET    Take 5 mg by mouth 2 times daily    ATORVASTATIN (LIPITOR) 20 MG TABLET    Take 20 mg by mouth nightly    CYANOCOBALAMIN 1000 MCG TABLET    Take 1,000 mcg by mouth daily    FENOFIBRATE (TRICOR) 145 MG TABLET    145 mg daily    FUROSEMIDE (LASIX) 40 MG TABLET    Take 1 tablet by mouth daily    GLIMEPIRIDE (AMARYL) 4 MG TABLET    4 mg with breakfast and with evening meal    HYDROXYZINE HCL (ATARAX) 25 MG TABLET    Take 25 mg by mouth nightly as needed    INSULIN ASPART (NOVOLOG FLEXPEN) 100 UNIT/ML INJECTION PEN    Inject into the skin 3 times daily (with meals)    METFORMIN (GLUCOPHAGE) 500 MG TABLET    500 mg 2 times daily (with meals)    METHENAMINE (HIPREX) 1 G TABLET    Take 1 g by mouth 2 times daily (with meals)    METOPROLOL SUCCINATE (TOPROL XL) 50 MG EXTENDED RELEASE TABLET    50 mg daily    MYRBETRIQ 25 MG TB24    25 mg daily    POTASSIUM CHLORIDE (KLOR-CON M) 20 MEQ EXTENDED RELEASE TABLET    Take 1 tablet by mouth daily for 10 days    QUETIAPINE (SEROQUEL) 25 MG TABLET    25 mg every evening    SERTRALINE (ZOLOFT) 25 MG TABLET        SPIRONOLACTONE (ALDACTONE) 25 MG TABLET    25 mg daily     ALLERGIES     has No Known Allergies. FAMILY HISTORY     has no family status information on file.       SOCIAL HISTORY       Social History     Tobacco Use    Smoking status: Never    Smokeless tobacco: Never   Substance Use Topics    Alcohol use: Not Currently    Drug use: Not Currently     PHYSICAL EXAM     INITIAL VITALS: BP (!) 103/56   Pulse (!) 105   Temp 98.1 °F (36.7 °C) (Oral)   Resp 22   Ht 5' 11\" (1.803 m)   Wt 275 lb (124.7 kg)   SpO2 94%   BMI 38.35 kg/m²    Physical Exam  Constitutional:       Appearance: Normal appearance. She is well-developed. She is not ill-appearing or toxic-appearing. HENT:      Head: Normocephalic and atraumatic. Eyes:      Conjunctiva/sclera: Conjunctivae normal.      Pupils: Pupils are equal, round, and reactive to light. Neck:      Trachea: Trachea normal.   Cardiovascular:      Rate and Rhythm: Regular rhythm. Tachycardia present. Heart sounds: S1 normal and S2 normal. No murmur heard. Pulmonary:      Effort: Pulmonary effort is normal. No accessory muscle usage or respiratory distress. Breath sounds: Normal breath sounds. Chest:      Chest wall: No deformity or tenderness. Abdominal:      General: Bowel sounds are normal. There is no distension or abdominal bruit. Palpations: Abdomen is not rigid. Tenderness: There is no abdominal tenderness. There is no guarding or rebound. Musculoskeletal:      Cervical back: Normal range of motion and neck supple. Skin:     General: Skin is warm. Findings: No rash. Neurological:      Mental Status: She is alert and oriented to person, place, and time. GCS: GCS eye subscore is 4. GCS verbal subscore is 5. GCS motor subscore is 6. Psychiatric:         Speech: Speech normal.       MEDICAL DECISION MAKING / ED COURSE:   Summary of Patient Presentation:    44-year-old female presents with complaints of generalized weakness, patient will be admitted to the hospital service. I check some basic blood work, the patient has some dehydration, I do not believe that she has eaten much all day, she will be given some IV fluids. Patient appears to have been taking some corticosteroids at the outlying facility, I believe this is the reason for her elevated white blood cell count rather than sepsis.   This was discussed with the nurse practitioner from the admitting service who stated she would investigate further and decide if any further testing or orders were needed. Shared Decision Making: The patient was involved in his/her plan of care through shared decision making. The testing that was ordered was discussed with the patient. Any medications that may have been ordered were discussed with the patient    Code Status Discussion:      \"ED Course\" Notes From Epic Narrator:         CRITICAL CARE:       PROCEDURES:    Procedures      DATA FOR LAB AND RADIOLOGY TESTS ORDERED BELOW ARE REVIEWED BY THE ED CLINICIAN:    RADIOLOGY: All x-rays, CT, MRI, and formal ultrasound images (except ED bedside ultrasound) are read by the radiologist, see reports below, unless otherwise noted in MDM or here. Reports below are reviewed by myself. No orders to display       LABS: Lab orders shown below, the results are reviewed by myself, and all abnormals are listed below.   Labs Reviewed   BASIC METABOLIC PANEL - Abnormal; Notable for the following components:       Result Value    Glucose 292 (*)     BUN 37 (*)     Creatinine 1.20 (*)     Est, Glom Filt Rate 51 (*)     Bun/Cre Ratio 31 (*)     Sodium 130 (*)     Chloride 94 (*)     All other components within normal limits   CBC WITH AUTO DIFFERENTIAL - Abnormal; Notable for the following components:    WBC 18.0 (*)     RBC 5.33 (*)     RDW 14.6 (*)     Seg Neutrophils 81 (*)     Lymphocytes 9 (*)     Eosinophils % 0 (*)     Immature Granulocytes 3 (*)     Segs Absolute 14.62 (*)     Absolute Mono # 1.21 (*)     Absolute Immature Granulocyte 0.46 (*)     All other components within normal limits       Vitals Reviewed:    Vitals:    02/28/23 1757 02/28/23 2037 02/28/23 2038   BP: (!) 103/56     Pulse: (!) 102 (!) 103 (!) 105   Resp: 24 22 22   Temp: 98.1 °F (36.7 °C)     TempSrc: Oral     SpO2: 94%     Weight: 275 lb (124.7 kg)     Height: 5' 11\" (1.803 m)       MEDICATIONS GIVEN TO PATIENT THIS ENCOUNTER:  Orders Placed This Encounter   Medications    0.9 % sodium chloride bolus     DISCHARGE PRESCRIPTIONS:  New Prescriptions    No medications on file     PHYSICIAN CONSULTS ORDERED THIS ENCOUNTER:  IP CONSULT TO HOSPITALIST  FINAL IMPRESSION      1. Generalized weakness          DISPOSITION/PLAN   DISPOSITION Decision To Admit 02/28/2023 07:31:21 PM      OUTPATIENT FOLLOW UP THE PATIENT:  No follow-up provider specified.     MD Anca Cabrera MD  02/28/23 2114

## 2023-03-01 LAB
ABSOLUTE EOS #: 0.04 K/UL (ref 0–0.44)
ABSOLUTE IMMATURE GRANULOCYTE: 0.39 K/UL (ref 0–0.3)
ABSOLUTE LYMPH #: 2.36 K/UL (ref 1.1–3.7)
ABSOLUTE MONO #: 1.04 K/UL (ref 0.1–1.2)
ANION GAP SERPL CALCULATED.3IONS-SCNC: 11 MMOL/L (ref 9–17)
BASOPHILS # BLD: 0 % (ref 0–2)
BASOPHILS ABSOLUTE: 0.03 K/UL (ref 0–0.2)
BUN SERPL-MCNC: 39 MG/DL (ref 8–23)
BUN/CREAT BLD: 36 (ref 9–20)
CALCIUM SERPL-MCNC: 8.9 MG/DL (ref 8.6–10.4)
CHLORIDE SERPL-SCNC: 98 MMOL/L (ref 98–107)
CO2 SERPL-SCNC: 22 MMOL/L (ref 20–31)
CREAT SERPL-MCNC: 1.09 MG/DL (ref 0.5–0.9)
EOSINOPHILS RELATIVE PERCENT: 0 % (ref 1–4)
GFR SERPL CREATININE-BSD FRML MDRD: 57 ML/MIN/1.73M2
GLUCOSE BLD-MCNC: 227 MG/DL (ref 65–105)
GLUCOSE BLD-MCNC: 235 MG/DL (ref 65–105)
GLUCOSE BLD-MCNC: 281 MG/DL (ref 65–105)
GLUCOSE BLD-MCNC: 318 MG/DL (ref 65–105)
GLUCOSE SERPL-MCNC: 309 MG/DL (ref 70–99)
HCT VFR BLD AUTO: 37.4 % (ref 36.3–47.1)
HGB BLD-MCNC: 12.1 G/DL (ref 11.9–15.1)
IMMATURE GRANULOCYTES: 3 %
INR PPP: 1.3
LYMPHOCYTES # BLD: 16 % (ref 24–43)
MCH RBC QN AUTO: 27 PG (ref 25.2–33.5)
MCHC RBC AUTO-ENTMCNC: 32.4 G/DL (ref 28.4–34.8)
MCV RBC AUTO: 83.5 FL (ref 82.6–102.9)
MONOCYTES # BLD: 7 % (ref 3–12)
NRBC AUTOMATED: 0 PER 100 WBC
PDW BLD-RTO: 14.6 % (ref 11.8–14.4)
PLATELET # BLD AUTO: 170 K/UL (ref 138–453)
PMV BLD AUTO: 10.3 FL (ref 8.1–13.5)
POTASSIUM SERPL-SCNC: 3.8 MMOL/L (ref 3.7–5.3)
PROTHROMBIN TIME: 16.2 SEC (ref 11.5–14.2)
RBC # BLD: 4.48 M/UL (ref 3.95–5.11)
RBC # BLD: ABNORMAL 10*6/UL
SEG NEUTROPHILS: 74 % (ref 36–65)
SEGMENTED NEUTROPHILS ABSOLUTE COUNT: 10.94 K/UL (ref 1.5–8.1)
SODIUM SERPL-SCNC: 131 MMOL/L (ref 135–144)
WBC # BLD AUTO: 14.8 K/UL (ref 3.5–11.3)

## 2023-03-01 PROCEDURE — 97530 THERAPEUTIC ACTIVITIES: CPT

## 2023-03-01 PROCEDURE — 85610 PROTHROMBIN TIME: CPT

## 2023-03-01 PROCEDURE — 82947 ASSAY GLUCOSE BLOOD QUANT: CPT

## 2023-03-01 PROCEDURE — 85025 COMPLETE CBC W/AUTO DIFF WBC: CPT

## 2023-03-01 PROCEDURE — 2580000003 HC RX 258: Performed by: NURSE PRACTITIONER

## 2023-03-01 PROCEDURE — 97167 OT EVAL HIGH COMPLEX 60 MIN: CPT

## 2023-03-01 PROCEDURE — 80048 BASIC METABOLIC PNL TOTAL CA: CPT

## 2023-03-01 PROCEDURE — 6370000000 HC RX 637 (ALT 250 FOR IP): Performed by: NURSE PRACTITIONER

## 2023-03-01 PROCEDURE — 97535 SELF CARE MNGMENT TRAINING: CPT

## 2023-03-01 PROCEDURE — 6370000000 HC RX 637 (ALT 250 FOR IP): Performed by: FAMILY MEDICINE

## 2023-03-01 PROCEDURE — 36415 COLL VENOUS BLD VENIPUNCTURE: CPT

## 2023-03-01 PROCEDURE — 1200000000 HC SEMI PRIVATE

## 2023-03-01 PROCEDURE — 97163 PT EVAL HIGH COMPLEX 45 MIN: CPT

## 2023-03-01 RX ORDER — METOPROLOL SUCCINATE 50 MG/1
50 TABLET, EXTENDED RELEASE ORAL DAILY
Status: DISCONTINUED | OUTPATIENT
Start: 2023-03-01 | End: 2023-03-05

## 2023-03-01 RX ORDER — ICOSAPENT ETHYL 1000 MG/1
2 CAPSULE ORAL 2 TIMES DAILY
COMMUNITY

## 2023-03-01 RX ORDER — SPIRONOLACTONE 25 MG/1
25 TABLET ORAL DAILY
Status: DISCONTINUED | OUTPATIENT
Start: 2023-03-01 | End: 2023-03-09 | Stop reason: HOSPADM

## 2023-03-01 RX ORDER — BUDESONIDE 0.5 MG/2ML
1 INHALANT ORAL DAILY
Status: ON HOLD | COMMUNITY
End: 2023-03-01

## 2023-03-01 RX ORDER — GLIPIZIDE 5 MG/1
2.5 TABLET ORAL
Status: DISCONTINUED | OUTPATIENT
Start: 2023-03-02 | End: 2023-03-09 | Stop reason: HOSPADM

## 2023-03-01 RX ORDER — ATORVASTATIN CALCIUM 20 MG/1
20 TABLET, FILM COATED ORAL NIGHTLY
Status: DISCONTINUED | OUTPATIENT
Start: 2023-03-01 | End: 2023-03-09 | Stop reason: HOSPADM

## 2023-03-01 RX ORDER — DILTIAZEM HYDROCHLORIDE 120 MG/1
120 CAPSULE, EXTENDED RELEASE ORAL 2 TIMES DAILY
COMMUNITY

## 2023-03-01 RX ORDER — QUETIAPINE FUMARATE 25 MG/1
25 TABLET, FILM COATED ORAL EVERY EVENING
Status: DISCONTINUED | OUTPATIENT
Start: 2023-03-01 | End: 2023-03-09 | Stop reason: HOSPADM

## 2023-03-01 RX ORDER — FUROSEMIDE 40 MG/1
40 TABLET ORAL DAILY
Status: DISCONTINUED | OUTPATIENT
Start: 2023-03-01 | End: 2023-03-09 | Stop reason: HOSPADM

## 2023-03-01 RX ORDER — DEXTROSE MONOHYDRATE 100 MG/ML
INJECTION, SOLUTION INTRAVENOUS CONTINUOUS PRN
Status: DISCONTINUED | OUTPATIENT
Start: 2023-03-01 | End: 2023-03-05 | Stop reason: SDUPTHER

## 2023-03-01 RX ORDER — FENOFIBRATE 54 MG/1
54 TABLET ORAL DAILY
Status: DISCONTINUED | OUTPATIENT
Start: 2023-03-01 | End: 2023-03-09 | Stop reason: HOSPADM

## 2023-03-01 RX ORDER — DILTIAZEM HYDROCHLORIDE 60 MG/1
120 CAPSULE, EXTENDED RELEASE ORAL 2 TIMES DAILY
Status: DISCONTINUED | OUTPATIENT
Start: 2023-03-01 | End: 2023-03-09 | Stop reason: HOSPADM

## 2023-03-01 RX ORDER — INSULIN LISPRO 100 [IU]/ML
0-8 INJECTION, SOLUTION INTRAVENOUS; SUBCUTANEOUS
Status: DISCONTINUED | OUTPATIENT
Start: 2023-03-01 | End: 2023-03-05 | Stop reason: SDUPTHER

## 2023-03-01 RX ORDER — POTASSIUM CHLORIDE 20 MEQ/1
20 TABLET, EXTENDED RELEASE ORAL DAILY
Status: ON HOLD | COMMUNITY
End: 2023-03-09 | Stop reason: HOSPADM

## 2023-03-01 RX ORDER — INSULIN LISPRO 100 [IU]/ML
0-4 INJECTION, SOLUTION INTRAVENOUS; SUBCUTANEOUS NIGHTLY
Status: DISCONTINUED | OUTPATIENT
Start: 2023-03-01 | End: 2023-03-05 | Stop reason: SDUPTHER

## 2023-03-01 RX ADMIN — QUETIAPINE FUMARATE 25 MG: 25 TABLET ORAL at 21:11

## 2023-03-01 RX ADMIN — ATORVASTATIN CALCIUM 20 MG: 20 TABLET, FILM COATED ORAL at 21:11

## 2023-03-01 RX ADMIN — DILTIAZEM HYDROCHLORIDE 120 MG: 60 CAPSULE, EXTENDED RELEASE ORAL at 21:11

## 2023-03-01 RX ADMIN — APIXABAN 5 MG: 5 TABLET, FILM COATED ORAL at 21:11

## 2023-03-01 RX ADMIN — METFORMIN HYDROCHLORIDE 500 MG: 500 TABLET, FILM COATED ORAL at 21:11

## 2023-03-01 RX ADMIN — SODIUM CHLORIDE, PRESERVATIVE FREE 10 ML: 5 INJECTION INTRAVENOUS at 21:12

## 2023-03-01 RX ADMIN — ACETAMINOPHEN 650 MG: 325 TABLET ORAL at 15:44

## 2023-03-01 RX ADMIN — SODIUM CHLORIDE, PRESERVATIVE FREE 10 ML: 5 INJECTION INTRAVENOUS at 08:12

## 2023-03-01 ASSESSMENT — PAIN DESCRIPTION - LOCATION: LOCATION: LEG

## 2023-03-01 ASSESSMENT — PAIN SCALES - GENERAL
PAINLEVEL_OUTOF10: 6
PAINLEVEL_OUTOF10: 0

## 2023-03-01 NOTE — PROGRESS NOTES
Pam 2  PROGRESS NOTE    Room # 2106/2106-01   Name: Richar Cook            Latter-day: unknown     Reason for visit: Routine    I visited the patient. Admit Date & Time: 2/28/2023  5:51 PM    Assessment:  Richar Cook is a 60 y.o. female in the hospital because \"weakness\". Upon entering the room pt was lying in bed      Intervention:  I introduced myself and my title as  I offered space for pt  to express feelings, needs, and concerns and provided a ministry presence. This writer offered encouraging words and prayer for pt    Outcome:  Pt expressed gratitude to writer    Plan:  Chaplains will remain available to offer spiritual and emotional support as needed.     Electronically signed by Jose Gayle on 3/1/2023 at 4:27 PM.  Betty       03/01/23 1625   Encounter Summary   Service Provided For: Patient   Referral/Consult From: [facility]   Support System Unknown   Last Encounter  03/01/23   Complexity of Encounter Low   Begin Time 1620   End Time  1623   Total Time Calculated 3 min   Encounter    Type Initial Screen/Assessment   Assessment/Intervention/Outcome   Assessment Unable to assess   Intervention Sustaining Presence/Ministry of presence   Outcome Expressed Gratitude

## 2023-03-01 NOTE — ED NOTES
ED to inpatient nurses report     Chief Complaint   Patient presents with    Fatigue     Unable to walk or stand. Discharged from [facility] today      Present to ED from home  LOC: alert and orientated to name and place  Vital signs   Vitals:    02/28/23 2037 02/28/23 2038 02/28/23 2318 02/28/23 2339   BP:       Pulse: (!) 103 (!) 105  (!) 103   Resp: 22 22 22   Temp:       TempSrc:       SpO2:   96%    Weight:       Height:          Oxygen Baseline room air    Current needs required none  SEPSIS: no  [no] Lactate X 2 ordered (Yes or No)  [no] Antibiotics given (Yes or No)  [no] IV Fluids ordered (Yes or No)             [yes] IV completed (Yes or No)  [yes] Hourly Vital Signs (Validated)  [no] Outstanding Orders:     LDAs:   Peripheral IV 02/28/23 Right Antecubital (Active)   Site Assessment Clean, dry & intact 02/28/23 1917   Line Status Blood return noted 02/28/23 On license of [facility] changed 02/28/23 1917   Phlebitis Assessment No symptoms 02/28/23 1917   Infiltration Assessment 0 02/28/23 1917     Mobility: Requires assistance * 2  Fall Risk:  yes  Pending ED orders: none  Present condition: stable  Code Status: full code  Consults: IP CONSULT TO HOSPITALIST  IP CONSULT TO SOCIAL WORK  [x]  Hospitalist  Completed  [x] yes [] no Who:   []  Medicine  Completed  [] yes [] No Who:   []  Cardiology  Completed  [] yes [] No Who:   []  GI   Completed  [] yes [] No Who:   []  Neurology  Completed  [] yes [] No Who:   []  Nephrology Completed  [] yes [] No Who:    []  Vascular  Completed  [] yes [] No Who:   []  Ortho  Completed  [] yes [] No Who:     []  Surgery  Completed  [] yes [] No Who:    []  Urology  Completed  [] yes [] No Who:    []  CT Surgery Completed  [] yes [] No Who:   []  Podiatry  Completed  [] yes [] No Who:    []  Other    Completed  [] yes [] No Who:  Interventions: IV, blood work, fluid  Important Events: Pt recenty discharged from [facility]. Caregiver states pt unable to stand or walk.          Electronically signed by Grace Sol RN on 3/1/2023 at 1:11 AM       Grace Sol RN  03/01/23 4040

## 2023-03-01 NOTE — PROGRESS NOTES
Physical Therapy  Facility/Department: [facility] MED SURG  Physical Therapy Initial Assessment    Name: Paula Samuels  : 1960  MRN: [id number]  Date of Service: 3/1/2023    Discharge Recommendations:    Pt currently functioning below baseline. Recommend daily inpatient skilled therapy at time of discharge to maximize long term outcomes and prevent re-admission. Please refer to AM-PAC score for current level of function. This is a 79-year-old female that presents with complaints of generalized weakness. Patient has caregivers at home, she had been admitted to [facility] recently for both upper respiratory infection as well as cardiac arrhythmia. The patient was discharged home today. The patient was significantly weak and was not able to ambulate into the house, EMS was called, because [facility] was an EMS bypass the patient was brought here for further evaluation. The patient does not really provide anything to the clinical history. Family of her provide much of the history. I reviewed the patient's records from [facility].            Patient Diagnosis(es): The encounter diagnosis was Generalized weakness. Past Medical History:  has a past medical history of Cerebral artery occlusion with cerebral infarction ([address] Diabetes mellitus ([facility] [address] HTN (hypertension), Mental disability, Pulmonary emboli ([address] and SVT (supraventricular tachycardia) ([facility] [address] Past Surgical History:  has no past surgical history on file. Assessment   Assessment: Pt. with MRDD and needing Max x 2 to stand with STAR SHORT. Appears to have just been at [facility] and they couldn't get her in the house so brought her straight to [facility]. Appears pt. has had 4 ER visits in past month including several admissions to [facility] recently.   Specific Instructions for Next Treatment: José Luis Reyna  Therapy Prognosis: Fair  Decision Making: High Complexity  Barriers to Learning: MRDD  Requires PT Follow-Up: Yes  Activity Tolerance  Activity Tolerance: Treatment limited secondary to decreased cognition     Plan   Physcial Therapy Plan  General Plan: 5-7 times per week  Specific Instructions for Next Treatment: Ryan Nelson  Current Treatment Recommendations: Strengthening, ROM, Balance training, Functional mobility training, Transfer training, Gait training, Endurance training, Home exercise program, Safety education & training, Patient/Caregiver education & training, Therapeutic activities  Safety Devices  Type of Devices: Gait belt, Patient at risk for falls, Bed alarm in place, Call light within reach, Nurse notified     Restrictions  Restrictions/Precautions  Restrictions/Precautions: Fall Risk, Up as Tolerated, Bed Alarm, General Precautions  Position Activity Restriction  Other position/activity restrictions: MRDD     Subjective   General  Chart Reviewed: Yes  Patient assessed for rehabilitation services?: Yes  Family / Caregiver Present: No  Follows Commands: Within Functional Limits  General Comment  Comments: Pt. appeared in pain with touching of or moving LEs but would not point to where it hurt specifically.          Social/Functional History  Social/Functional History  Lives With: Family ((lives with brother (also MR), sister Herbert Coelho helps as needed))  Type of Home: Apartment  Home Layout: One level  Home Access: Stairs to enter with rails  Entrance Stairs - Number of Steps: 3  Entrance Stairs - Rails: Both  Bathroom Equipment: Shower chair  Home Equipment: Hospital bed, Walker, rolling  Has the patient had two or more falls in the past year or any fall with injury in the past year?: Yes (fall history)  Receives Help From: Home health (HHA; has help available 24/7, but unsure how much they actually come))  ADL Assistance: Needs assistance  Homemaking Assistance: Needs assistance (HHAs and sister?)  Ambulation Assistance: Needs assistance (Pt. was just at [facility] and when was taken home could not amb. into the house so was brought to [address] [place] [address] on bypass.))  Transfer Assistance: Needs assistance  Active : No  Type of Occupation: Pt. states she works at [employer]  Additional Comments: Pt. readmitted 2/19 after d/c home with home care 2/9/23. Per previous chart, pt. has aides 24/7 from [facility]. Pt. with \"mental disability\" per chart. Speech difficult to understand. RN had obtained some social functional information from pt's sister Phyllis Gomes who called the hospital from a cruise ship last admission. THIS ADMISSION- It appears pt. was just at [facility] and when they took her home she couldn't amb. so was brought directly to [address] [place] [address] as [place] on bypass. Per chart it appears pt. has had 4 ER visits in last month. Vision/Hearing       Cognition   Cognition  Cognition Comment: MRDD; speech difficult to understand     Objective   Heart Rate: 98  Heart Rate Source: Monitor  BP: 118/72  BP Location: Left upper arm  MAP (Calculated): 87  Resp: 18  SpO2: 95 %  O2 Device: None (Room air)     Observation/Palpation  Posture: Fair  Observation: Difficult to communicate with pt.; she answers some questions but speech difficult to understand. Gross Assessment  AROM: Generally decreased, functional (Pt. yells out in pain with leg movements bilat. Inconsistent complaints of pain with same movements or touching of legs. Difficult to assess. Pt. willing to move legs to get out to bed and stand with no c/o pain.)                    Bed mobility  Supine to Sit: Maximum assistance;2 Person assistance  Sit to Supine: Maximum assistance;2 Person assistance  Transfers  Sit to Stand: Maximum Assistance;2 Person Assistance (Used STAR DEJA to stand. On second attempt, able to achieve full standing with max x 2. Once standing, able to maintain with min-mod A x 2.  Stood for 40 sec. before resting on seat of STAR. Stood from there for another 30 sec. with same assist.)  Not approp.  For recliner at this time as would be too difficult to transfer out of recliner.            Exercise Treatment: AAROM supine heel slides x 5 bilat.  See ROM comments.  Static Sitting Balance Exercises: fair;  LOB to Rt. initially, needing min > mod A to correct.  Once midline established, pt. able to maintain with CGA.  Dynamic Standing Balance Exercises: When standing in STAR STEDY, pt unable to wt. shift or take steps in place.        OutComes Score                                                  AM-PAC Score  AM-PAC Inpatient Mobility Raw Score : 9 (03/01/23 1324)  AM-PAC Inpatient T-Scale Score : 30.55 (03/01/23 1324)  Mobility Inpatient CMS 0-100% Score: 81.38 (03/01/23 1324)  Mobility Inpatient CMS G-Code Modifier : CM (03/01/23 1324)          Tinneti Score       Goals  Short Term Goals  Time Frame for Short Term Goals: 12 visits:  Short Term Goal 1: Pt. to requrie mod A for bed mob.  Short Term Goal 2: Pt to requrie mod A x2 for sit to stand transfer with RW  Short Term Goal 3: Pt. to takes steps along EOB with RW, mod A x 2  Short Term Goal 4: Pt. to have good dynamic sitting balance  Short Term Goal 5: Pt. to tolerate 25+ min. of PT daily for ther ex/ balance training/ functional mob. training  Patient Goals   Patient Goals : Pt. states she knows she is going to a \"nursing home\"       Education  Patient Education  Education Given To: Patient  Education Provided: Role of Therapy;Plan of Care  Education Method: Demonstration;Verbal  Barriers to Learning: Cognition  Education Outcome: Continued education needed      Therapy Time   Individual Concurrent Group Co-treatment   Time In 1020         Time Out 1055         Minutes 35          Treatment time:  25min.   Co-treatment with OT warranted secondary to decreased safety and independence requiring 2 skilled therapy professionals to address individual discipline's goals.        ANGE SHI, PT

## 2023-03-01 NOTE — PROGRESS NOTES
Pt admitted to room 2106 from ER. Oriented to room and call light/tv controls. Bed in lowest position, wheels locked, 2/4 side rails up  Call light in reach, room free of clutter, adequate lighting provided. RN unable to complete admission database d/t mental disability.    Pharmacy medication reconciliation services requested via:    [x] Perfect Serve Message sent to: Elliot Bojorquez Pharmacist

## 2023-03-01 NOTE — PROGRESS NOTES
Occupational Therapy  Facility/Department: [facility] MED SURG  Occupational Therapy Initial Assessment    Name: Dominic Carrillo  : 1960  MRN: [id number]  Date of Service: 3/1/2023    Discharge Recommendations:  Patient would benefit from continued therapy after discharge   Pt currently functioning below baseline. Recommend daily inpatient skilled therapy at time of discharge to maximize long term outcomes and prevent re-admission. Please refer to AM-PAC score for current level of function. Patient Diagnosis(es): The encounter diagnosis was Generalized weakness. Past Medical History:  has a past medical history of Cerebral artery occlusion with cerebral infarction ([facility] Diabetes mellitus ([facility] HTN (hypertension), Mental disability, Pulmonary emboli ([facility] and SVT (supraventricular tachycardia) ([facility] Past Surgical History:  has no past surgical history on file. Assessment   Performance deficits / Impairments: Decreased functional mobility ; Decreased ADL status; Decreased strength;Decreased cognition;Decreased safe awareness;Decreased balance;Decreased posture;Decreased endurance;Decreased coordination  Assessment: . Pt with deficits of cognition, strength, bed mobility, transfers,  balance, safety awareness and endurance this session,  & required 2 assist for standing with Gurmeet Doom and pt not able to tolerate ambulation this session. With current deficits, Pt HIGH risk for falls & requires continued OT to maximize independence with functional mobility, balance, safety awareness & activity tolerance.    Prognosis: Fair  Decision Making: High Complexity  REQUIRES OT FOLLOW-UP: Yes  Activity Tolerance  Activity Tolerance: Patient limited by fatigue;Treatment limited secondary to decreased cognition;Patient limited by pain        Plan   Occupational Therapy Plan  Times Per Week: 4-5x/week  Current Treatment Recommendations: Strengthening, Balance training, Functional mobility training, Self-Care / ADL, Safety education & training, Endurance training, Patient/Caregiver education & training, Equipment evaluation, education, & procurement, Positioning, Cognitive/Perceptual training, Cognitive reorientation     Restrictions  Restrictions/Precautions  Restrictions/Precautions: Fall Risk, Up as Tolerated, Bed Alarm, General Precautions  Position Activity Restriction  Other position/activity restrictions: MRDD    Subjective   General  Chart Reviewed: Yes  Patient assessed for rehabilitation services?: Yes  Family / Caregiver Present: No     Social/Functional History  Social/Functional History  Lives With: (P) Family, Home care staff  Type of Home: (P) House  Home Layout: (P) One level  Home Access: Stairs to enter with rails  Entrance Stairs - Number of Steps: 3  Entrance Stairs - Rails: Both  Bathroom Equipment: Shower chair  Home Equipment: (P) [place], 4 wheeled, Walker, standard  Has the patient had two or more falls in the past year or any fall with injury in the past year?: Yes (fall history)  Receives Help From: (P) Home health  ADL Assistance: (P) Needs assistance  Homemaking Assistance: Needs assistance (HHAs and sister?)  Ambulation Assistance: (P) Needs assistance  Transfer Assistance: (P) Needs assistance  Active : (P) No  Patient's  Info: (P) sister Lelo Cheney  Occupation: (P) Unemployed  Type of Occupation: Pt. states she works at [employer]  Additional Comments: Pt. readmitted 2/19 after d/c home with home care 2/9/23. Per previous chart, pt. has aides 24/7 from [facility]. Pt. with \"mental disability\" per chart. Speech difficult to understand. RN had obtained some social functional information from pt's sister Lelo Cheney who called the hospital from a cruise ship last admission. THIS ADMISSION- It appears pt. was just at [facility] and when they took her home she couldn't amb. so was brought directly to [address] [place] [address] as [place] on bypass.   Per chart it appears pt. has had 4 ER visits in last month. Objective   Heart Rate: 98  Heart Rate Source: Monitor  BP: 118/72  BP Location: Left upper arm  MAP (Calculated): 87  Resp: 18  SpO2: 95 %  O2 Device: None (Room air)          Observation/Palpation  Posture: Fair  Observation: Difficult to communicate with pt.; she answers some questions but speech difficult to understand. Pt c/o B leg pain and needs a lot of encouragemnt to move this session. Safety Devices  Type of Devices: Gait belt;Patient at risk for falls; Bed alarm in place;Call light within reach;Nurse notified; All fall risk precautions in place  Balance  Sitting: With support (pt sat EOB x 5 min in prep for trialing standing. Pt leaning posteriorly and needing min-mod A and  inc time to gain balance)  Standing: With support (max A x 2 in laine stedy. Pt with poor tolerance for standing)  Gait  Overall Level of Assistance: Other (comment) (unable to take steps. Needing [place] stedy and heavy max Ax 2 with bed raised to stand.)  Interventions: Safety awareness training;Verbal cues; Tactile cues; Visual cues  Assistive Device: Gait belt     AROM: Within functional limits  Strength: Generally decreased, functional  Coordination: Generally decreased, functional  Tone: Normal  Sensation:  (unable to assess)  ADL  Feeding: Setup;Supervision  Grooming: Minimal assistance; Moderate assistance  UE Bathing: Moderate assistance;Maximum assistance  LE Bathing: Maximum assistance  UE Dressing:  Moderate assistance;Maximum assistance  LE Dressing: Maximum assistance;Dependent/Total  Toileting: Maximum assistance;Dependent/Total  Additional Comments: pt needing max cues to intitiate and sequence all tasks     Activity Tolerance  Activity Tolerance: Treatment limited secondary to decreased cognition  Bed mobility  Supine to Sit: Maximum assistance;2 Person assistance  Sit to Supine: Maximum assistance;2 Person assistance  Transfers  Sit to stand: Maximum assistance;2 Person assistance  Stand to sit: 2 Person assistance;Maximum assistance  Transfer Comments: pt unable to stand with Conny Grippe and max A x2 from bed in standard position. With bed raised, pt able to achieve stand with heavy max A x 2 and max cues to pull with arms. Pt needing to be assisted back into bed being moved in Conny Grippe up towards [address]. Pt would be unable to stand from any lower of surface (unable to leave in recliner which was original goal). Pt would need lift for any functional transfer  Vision  Vision:  (unable to fully assess)  Cognition  Overall Cognitive Status: Exceptions  Arousal/Alertness: Appropriate responses to stimuli  Following Commands: Inconsistently follows commands; Follows one step commands with repetition  Attention Span: Difficulty attending to directions  Memory: Decreased recall of biographical Information;Decreased recall of precautions;Decreased recall of recent events  Safety Judgement: Decreased awareness of need for safety;Decreased awareness of need for assistance  Problem Solving: Assistance required to generate solutions;Assistance required to implement solutions;Decreased awareness of errors;Assistance required to correct errors made  Insights: Decreased awareness of deficits  Initiation: Requires cues for all  Sequencing: Requires cues for all  Cognition Comment: MRDD; speech difficult to understand  Orientation  Overall Orientation Status: Impaired  Orientation Level: Oriented to person;Disoriented to time;Disoriented to situation;Disoriented to place  Perception  Overall Perceptual Status: [facility]               Education Given To: Patient  Education Provided: Role of Therapy;Plan of Care;Home Exercise Program;Precautions; ADL Adaptive Strategies;Transfer Training;Energy Conservation; Fall Prevention Strategies; Equipment; Family Education  Education Method: Demonstration;Verbal  Barriers to Learning: Cognition  Education Outcome: Continued education needed              AM-PAC Score        AM-PAC Inpatient Daily Activity Raw Score: 9 (03/01/23 1339)  AM-PAC Inpatient ADL T-Scale Score : 25.33 (03/01/23 1339)  ADL Inpatient CMS 0-100% Score: 79.59 (03/01/23 1339)  ADL Inpatient CMS G-Code Modifier : CL (03/01/23 1339)    Tinneti Score       Goals  Short Term Goals  Time Frame for Short Term Goals: by discharge, pt will  Short Term Goal 1: demo min A x 1 with bed mob with rails/controls  Short Term Goal 2: demo mod A x 2 with ADL transfers with approp AD/DME as needed with good safety  Short Term Goal 3: demo SBA with sitting balance at EOB x 15 min for simple ADL completion and prep for transfers  Short Term Goal 4: participate in B UE HEP for strengthening/ROM to inc. function with transfers/AdLs/mob  Short Term Goal 5: demo SBA with simple grooming task and UB ADLs with mod cues for initiation/sequencing  Patient Goals   Patient goals : \"I am going to nursing home\"       Therapy Time   Individual Concurrent Group Co-treatment   Time In 1021         Time Out 1055         Minutes 34             Treatment min: 23  Co-treatment with PT warranted secondary to decreased safety and independence requiring 2 skilled therapy professionals to address individual discipline's goals. OT addressing preparation for ADL transfer, sitting balance for increased ADL performance, sitting/activity tolerance, functional reaching, environmental safety/scanning, fall prevention, ability to sequence and follow directions, bed mobility tech, and functional UE strength.      Monica Santos, OT

## 2023-03-01 NOTE — CARE COORDINATION
03/01/23 1328   Service Assessment   Patient Orientation Unable to Assess  (Hx MRDD spoke with sister Herbert Coelho)   Cognition Alert   History Provided By Child/Family  (sister Herbert Coelho)   Primary Caregiver Other (Comment)  ([facility])   Support Systems Family Members;Home Care Staff   PCP Verified by CM Yes   Last Visit to PCP Within last 3 months   Prior Functional Level Assistance with the following:;Cooking;Housework; Shopping;Mobility   Current Functional Level Assistance with the following:;Bathing;Dressing; Toileting;Cooking;Housework; Shopping;Mobility   Can patient return to prior living arrangement No   Ability to make needs known: Other (see comment)  (sister Herbert Coelho)   [employer]; Medicare   Community Resources ECF/Home Care   Social/Functional History   Lives With Family;Home care staff   Type of [address] One level   Home Equipment [place], 4 wheeled; [place], standard   Receives Help From Home health   ADL Assistance Needs assistance   Ambulation Assistance Needs assistance   Transfer Assistance Needs assistance   Active  No   Patient's  Info sister Herbert Coelho   Occupation Unemployed   Discharge Planning   Type of Residence [employer]   Living Arrangements Family Members   Current Services Prior To Admission Durable Medical Equipment;Home Care   Current DME Prior to [employer]; [address]   DME Ordered? No   Type of Home Care Services PT;OT;Aide Services   Patient expects to be discharged to: Skilled nursing facility   One/Two Story Residence One story   History of falls? 0   Services At/After Discharge   Transition of Care Consult (CM Consult) SNF   Services At/After Discharge [facility] (SNF)   Mode of Transport at Discharge   (ambulance)   Condition of Participation: Discharge Planning   The Patient and/or Patient Representative was provided with a Choice of Provider?  Patient Representative   The Patient and/Or Patient Representative agree with the Discharge Plan? Yes   Freedom of Choice list was provided with basic dialogue that supports the patient's individualized plan of care/goals, treatment preferences, and shares the quality data associated with the providers? Yes         Spoke with pt sister Omar Del Rio, supervises and helps care for siblings. Pt has a HX MRDD. Lives with brother with MRDD 1 story home-has 24/7 non skilled care with [address] and [address]  skilled care (confirmed with Jodie Muñiz). Pt has been hospitalized most of February STA 2/4-2/10, STA 2/19-2/22, TTH 2/2-2/28 not home one day and then here unable to ambulate. Penny Salinas agrees pt will need snf-1st choice [employer] 2nd choice [facility] [employer] [facility]. Informed LSW. Informed Altamease Kinnier tomorrow or possibly today if physician agrees. Pt will need ambulance transport to facility. PT did work with pt. [facility]-they deliver.

## 2023-03-01 NOTE — CARE COORDINATION
DC Planning    Spoke with pt sister Monique Puente [phone] to snf prefers [facility] 1 st choice, [employer] [facility] as 2nd choice.

## 2023-03-01 NOTE — H&P
History & Physical  [facility]    Adult Hospitalist      Name: Paula Samuels  MRN: [id number]     Acct: [de-identified]  Room: 2106/2106-01    Admit Date: 2/28/2023  5:51 PM  PCP: Son Taylor MD    Primary Problem  Principal Problem:    Weakness  Resolved Problems:    * No resolved hospital problems. *        Assesment:     Unable to ambulate  Weakness  Atrial fibrillation with controlled rate  Chronic diastolic congestive heart failure  Mild mitral stenosis  Moderate left ventricular hypertrophy on echo 2/6/2023  Cognitive impairment  Essential hypertension  Diabetes mellitus type 2  Hyperlipidemia  Urgent continence  Morbid obesity with BMI 38.3        Plan:     Admit to MedSurg  Monitor vitals closely  Keep SPO2 above 90%  I's and O's  IV  Pain control  Antiemetics as needed  Reviewed chart and paperwork  Discussed with medical staff  PT/OT  Consult social work  Patient requires physical rehabilitation. Apparently her POA refused SNF during her recent admission to the [address] [place] [address] Dr before meals and at bedtime  Insulin sliding scale, medium  Hypoglycemia protocol  Resume essential home medication  Add parameters  CBC, BMP  DVT and GI prophylaxis. Chief Complaint:     Chief Complaint   Patient presents with    Fatigue     Unable to walk or stand. Discharged from [facility] today         History of Present Illness:      Paula Samuels is a 60 y.o.  female who presents with Fatigue (Unable to walk or stand. Discharged from [facility] today)    Admitted to the emergency room presented with family with complaints of generalized weakness. Patient speaks routinely in 1-2 word sentences and has not been able to provide any significant history herself. Most of the information has been obtained from her family members and POA through the emergency room personnel and nursing staff. Has not been able to ambulate with assistance of PT either. She requires at least 2 assist to transfer from bed to chair. Patient is not in any apparent distress. She has not complained of any chest pain, headache, vision change, abdominal pain, dysuria, diarrhea or joint swelling    I have personally reviewed the past medical history, past surgical history, medications, social history, and family history, and summarized in the note. Review of Systems:     All 10 point system is reviewed and negative otherwise mentioned in HPI. Past Medical History:     Past Medical History:   Diagnosis Date    Cerebral artery occlusion with cerebral infarction ([facility]     [facility]     HTN (hypertension)     Mental disability     Pulmonary emboli ([facility])     SV[facility] ([facility]ra[facility]ricular tachy[facility]) [facility]         Past Surgical History:     No past surgical history on file. Medications Prior to Admission:       Prior to Admission medications    Medication Sig Start Date End Date Taking?  Authorizing Provider   budesonide (PULMICORT) 0.5 MG/2ML nebulizer suspension Take 1 ampule by nebulization daily   Yes Historical Provider, MD   dilTIAZem (CARDIZEM 12 HR) 120 MG extended release capsule Take 120 mg by mouth 2 times daily   Yes Historical Provider, MD   potassium chloride (KLOR-CON M) 20 MEQ extended release tablet Take 1 tablet by mouth daily for 10 days 2/21/23 3/3/23  Romelia Cabello MD   furosemide (LASIX) 40 MG tablet Take 1 tablet by mouth daily 2/10/23   Romelia Cabello MD   methenamine (HIPREX) 1 g tablet Take 1 g by mouth 2 times daily (with meals)    Historical Provider, MD   apixaban (ELIQUIS) 5 MG TABS tablet Take 5 mg by mouth 2 times daily 1/29/21   Historical Provider, MD   atorvastatin (LIPITOR) 20 MG tablet Take 20 mg by mouth nightly 4/17/21   Historical Provider, MD   cyanocobalamin 1000 MCG tablet Take 1,000 mcg by mouth daily 4/17/21   Historical Provider, MD   fenofibrate (TRICOR) 145 MG tablet 145 mg daily 1/26/23   Historical Provider, MD   glimepiride (AMARYL) 4 MG tablet 4 mg with breakfast and with evening meal 23   Historical Provider, MD   hydrOXYzine HCl (ATARAX) 25 MG tablet Take 25 mg by mouth nightly as needed 21   Historical Provider, MD   metFORMIN (GLUCOPHAGE) 500 MG tablet 500 mg 2 times daily (with meals) 23   Historical Provider, MD   insulin aspart (NOVOLOG FLEXPEN) 100 UNIT/ML injection pen Inject into the skin 3 times daily (with meals) 12/3/19   Historical Provider, MD   metoprolol succinate (TOPROL XL) 50 MG extended release tablet 50 mg daily 23   Historical Provider, MD   MYRBETRIQ 25 MG TB24 25 mg daily 23   Historical Provider, MD   QUEtiapine (SEROQUEL) 25 MG tablet 25 mg every evening 23   Historical Provider, MD   sertraline (ZOLOFT) 25 MG tablet  23   Historical Provider, MD   spironolactone (ALDACTONE) 25 MG tablet 25 mg daily 23   Historical Provider, MD        Allergies:       Patient has no known allergies. Social History:     Tobacco:    reports that she has never smoked. She has never used smokeless tobacco.  Alcohol:      reports that she does not currently use alcohol. Drug Use:  reports that she does not currently use drugs. Family History:     No family history on file.       Physical Exam:     Vitals:  /72   Pulse 98   Temp 97.9 °F (36.6 °C)   Resp 18   Ht 5' 11\" (1.803 m)   Wt 275 lb (124.7 kg)   SpO2 95%   BMI 38.35 kg/m²   Temp (24hrs), Av.1 °F (36.7 °C), Min:97.9 °F (36.6 °C), Max:98.2 °F (36.8 °C)          General appearance - lethargic, and in no acute distress, obese  Mental status -lethargic  Head - normocephalic and atraumatic  Eyes - pupils equal and reactive, extraocular eye movements intact, conjunctiva clear  Ears - hearing appears to be intact  Nose - no drainage noted  Mouth - mucous membranes moist  Neck - supple, no carotid bruits, thyroid not palpable  Chest -reduced breath sounds bilateral, clear to auscultation, normal effort  Heart - normal rate, regular rhythm, no murmur  Abdomen - soft, obese, nontender, nondistended, bowel sounds present all four quadrants, no masses, hepatomegaly or splenomegaly  Neurological -lethargic  Extremities - no pedal edema or calf pain with palpation  Skin - no gross lesions, rashes, or induration noted        Data:     Labs:    Hematology:  Recent Labs     02/28/23 1911 03/01/23  0610   WBC 18.0* 14.8*   RBC 5.33* 4.48   HGB 14.4 12.1   HCT 44.9 37.4   MCV 84.2 83.5   MCH 27.0 27.0   MCHC 32.1 32.4   RDW 14.6* 14.6*    170   MPV 9.9 10.3   INR  --  1.3     Chemistry:  Recent Labs     02/28/23 1911 03/01/23  0610   * 131*   K 4.1 3.8   CL 94* 98   CO2 23 22   GLUCOSE 292* 309*   BUN 37* 39*   CREATININE 1.20* 1.09*   ANIONGAP 13 11   LABGLOM 51* 57*   CALCIUM 9.3 8.9     Recent Labs     03/01/23  0631   POCGLU 318*       Lab Results   Component Value Date    INR 1.3 03/01/2023    INR 1.7 02/05/2023    INR 1.1 08/24/2012    PROTIME 16.2 (H) 03/01/2023    PROTIME 19.9 (H) 02/05/2023    PROTIME 11.4 08/24/2012       Lab Results   Component Value Date/Time    SPECIAL NOT REPORTED 08/29/2012 06:42 PM     Lab Results   Component Value Date/Time    CULTURE ESCHERICHIA COLI >[id number] CFU/ML (A) 02/07/2023 06:24 PM       No results found for: POCPH, PHART, PH, POCPCO2, ANK0YUH, PCO2, POCPO2, PO2ART, PO2, POCHCO3, JMI5ZIB, HCO3, NBEA, PBEA, BEART, BE, THGBART, THB, LZU2WJB, CXND2DSV, H8JLWSXT, O2SAT, FIO2    Radiology:    No results found. All radiological studies reviewed                Code Status:  Full Code    Electronically signed by Jordin Gilbert MD on 3/1/2023 at 9:24 AM     Copy sent to Dr. Son Taylor MD    This note was created with the assistance of a speech-recognition program.  Although the intention is to generate a document that actually reflects the content of the visit, no guarantees can be provided that every mistake has been identified and corrected by editing.      Note was updated later by me after  physical examination and  completion of the assessment.

## 2023-03-02 ENCOUNTER — APPOINTMENT (OUTPATIENT)
Dept: GENERAL RADIOLOGY | Age: 63
DRG: 064 | End: 2023-03-02
Payer: MEDICARE

## 2023-03-02 LAB
ABSOLUTE EOS #: 0.08 K/UL (ref 0–0.44)
ABSOLUTE IMMATURE GRANULOCYTE: 0.33 K/UL (ref 0–0.3)
ABSOLUTE LYMPH #: 2.03 K/UL (ref 1.1–3.7)
ABSOLUTE MONO #: 0.99 K/UL (ref 0.1–1.2)
ANION GAP SERPL CALCULATED.3IONS-SCNC: 11 MMOL/L (ref 9–17)
BASOPHILS # BLD: 0 % (ref 0–2)
BASOPHILS ABSOLUTE: <0.03 K/UL (ref 0–0.2)
BUN SERPL-MCNC: 29 MG/DL (ref 8–23)
BUN/CREAT BLD: 34 (ref 9–20)
CALCIUM SERPL-MCNC: 8.9 MG/DL (ref 8.6–10.4)
CHLORIDE SERPL-SCNC: 103 MMOL/L (ref 98–107)
CO2 SERPL-SCNC: 21 MMOL/L (ref 20–31)
CREAT SERPL-MCNC: 0.86 MG/DL (ref 0.5–0.9)
EOSINOPHILS RELATIVE PERCENT: 1 % (ref 1–4)
GFR SERPL CREATININE-BSD FRML MDRD: >60 ML/MIN/1.73M2
GLUCOSE BLD-MCNC: 219 MG/DL (ref 65–105)
GLUCOSE BLD-MCNC: 220 MG/DL (ref 65–105)
GLUCOSE BLD-MCNC: 248 MG/DL (ref 65–105)
GLUCOSE BLD-MCNC: 281 MG/DL (ref 65–105)
GLUCOSE SERPL-MCNC: 231 MG/DL (ref 70–99)
HCT VFR BLD AUTO: 38.1 % (ref 36.3–47.1)
HGB BLD-MCNC: 12.5 G/DL (ref 11.9–15.1)
IMMATURE GRANULOCYTES: 2 %
LYMPHOCYTES # BLD: 14 % (ref 24–43)
MCH RBC QN AUTO: 27.4 PG (ref 25.2–33.5)
MCHC RBC AUTO-ENTMCNC: 32.8 G/DL (ref 28.4–34.8)
MCV RBC AUTO: 83.4 FL (ref 82.6–102.9)
MONOCYTES # BLD: 7 % (ref 3–12)
NRBC AUTOMATED: 0 PER 100 WBC
PDW BLD-RTO: 14.6 % (ref 11.8–14.4)
PLATELET # BLD AUTO: 129 K/UL (ref 138–453)
PMV BLD AUTO: 10.2 FL (ref 8.1–13.5)
POTASSIUM SERPL-SCNC: 4 MMOL/L (ref 3.7–5.3)
RBC # BLD: 4.57 M/UL (ref 3.95–5.11)
RBC # BLD: ABNORMAL 10*6/UL
SEG NEUTROPHILS: 76 % (ref 36–65)
SEGMENTED NEUTROPHILS ABSOLUTE COUNT: 10.81 K/UL (ref 1.5–8.1)
SODIUM SERPL-SCNC: 135 MMOL/L (ref 135–144)
WBC # BLD AUTO: 14.3 K/UL (ref 3.5–11.3)

## 2023-03-02 PROCEDURE — 2500000003 HC RX 250 WO HCPCS: Performed by: FAMILY MEDICINE

## 2023-03-02 PROCEDURE — 85025 COMPLETE CBC W/AUTO DIFF WBC: CPT

## 2023-03-02 PROCEDURE — 97530 THERAPEUTIC ACTIVITIES: CPT

## 2023-03-02 PROCEDURE — 82947 ASSAY GLUCOSE BLOOD QUANT: CPT

## 2023-03-02 PROCEDURE — 97535 SELF CARE MNGMENT TRAINING: CPT

## 2023-03-02 PROCEDURE — 36415 COLL VENOUS BLD VENIPUNCTURE: CPT

## 2023-03-02 PROCEDURE — 51701 INSERT BLADDER CATHETER: CPT

## 2023-03-02 PROCEDURE — 2580000003 HC RX 258: Performed by: NURSE PRACTITIONER

## 2023-03-02 PROCEDURE — 97110 THERAPEUTIC EXERCISES: CPT

## 2023-03-02 PROCEDURE — 80048 BASIC METABOLIC PNL TOTAL CA: CPT

## 2023-03-02 PROCEDURE — 6370000000 HC RX 637 (ALT 250 FOR IP): Performed by: NURSE PRACTITIONER

## 2023-03-02 PROCEDURE — 6370000000 HC RX 637 (ALT 250 FOR IP): Performed by: FAMILY MEDICINE

## 2023-03-02 PROCEDURE — 72100 X-RAY EXAM L-S SPINE 2/3 VWS: CPT

## 2023-03-02 PROCEDURE — 1200000000 HC SEMI PRIVATE

## 2023-03-02 RX ORDER — INSULIN LISPRO 100 [IU]/ML
0-8 INJECTION, SOLUTION INTRAVENOUS; SUBCUTANEOUS
Status: DISCONTINUED | OUTPATIENT
Start: 2023-03-03 | End: 2023-03-02 | Stop reason: SDUPTHER

## 2023-03-02 RX ORDER — INSULIN LISPRO 100 [IU]/ML
0-4 INJECTION, SOLUTION INTRAVENOUS; SUBCUTANEOUS NIGHTLY
Status: DISCONTINUED | OUTPATIENT
Start: 2023-03-02 | End: 2023-03-02 | Stop reason: SDUPTHER

## 2023-03-02 RX ORDER — DEXTROSE MONOHYDRATE 100 MG/ML
INJECTION, SOLUTION INTRAVENOUS CONTINUOUS PRN
Status: DISCONTINUED | OUTPATIENT
Start: 2023-03-02 | End: 2023-03-02 | Stop reason: SDUPTHER

## 2023-03-02 RX ORDER — GABAPENTIN 100 MG/1
100 CAPSULE ORAL 3 TIMES DAILY
Status: DISCONTINUED | OUTPATIENT
Start: 2023-03-02 | End: 2023-03-09 | Stop reason: HOSPADM

## 2023-03-02 RX ADMIN — ACETAMINOPHEN 650 MG: 325 TABLET ORAL at 00:36

## 2023-03-02 RX ADMIN — SERTRALINE 25 MG: 25 TABLET, FILM COATED ORAL at 08:09

## 2023-03-02 RX ADMIN — GLIPIZIDE 2.5 MG: 5 TABLET ORAL at 08:09

## 2023-03-02 RX ADMIN — ATORVASTATIN CALCIUM 20 MG: 20 TABLET, FILM COATED ORAL at 20:30

## 2023-03-02 RX ADMIN — METFORMIN HYDROCHLORIDE 500 MG: 500 TABLET, FILM COATED ORAL at 08:09

## 2023-03-02 RX ADMIN — METFORMIN HYDROCHLORIDE 500 MG: 500 TABLET, FILM COATED ORAL at 18:18

## 2023-03-02 RX ADMIN — FENOFIBRATE 54 MG: 54 TABLET ORAL at 08:10

## 2023-03-02 RX ADMIN — INSULIN LISPRO 2 UNITS: 100 INJECTION, SOLUTION INTRAVENOUS; SUBCUTANEOUS at 08:13

## 2023-03-02 RX ADMIN — ACETAMINOPHEN 650 MG: 325 TABLET ORAL at 08:10

## 2023-03-02 RX ADMIN — APIXABAN 5 MG: 5 TABLET, FILM COATED ORAL at 20:30

## 2023-03-02 RX ADMIN — SPIRONOLACTONE 25 MG: 25 TABLET ORAL at 08:10

## 2023-03-02 RX ADMIN — SODIUM CHLORIDE, PRESERVATIVE FREE 10 ML: 5 INJECTION INTRAVENOUS at 20:30

## 2023-03-02 RX ADMIN — ANTI-FUNGAL POWDER MICONAZOLE NITRATE TALC FREE: 1.42 POWDER TOPICAL at 20:30

## 2023-03-02 RX ADMIN — APIXABAN 5 MG: 5 TABLET, FILM COATED ORAL at 08:09

## 2023-03-02 RX ADMIN — INSULIN LISPRO 2 UNITS: 100 INJECTION, SOLUTION INTRAVENOUS; SUBCUTANEOUS at 18:18

## 2023-03-02 RX ADMIN — DILTIAZEM HYDROCHLORIDE 120 MG: 60 CAPSULE, EXTENDED RELEASE ORAL at 08:09

## 2023-03-02 RX ADMIN — METOPROLOL SUCCINATE 50 MG: 50 TABLET, EXTENDED RELEASE ORAL at 08:09

## 2023-03-02 RX ADMIN — FUROSEMIDE 40 MG: 40 TABLET ORAL at 08:09

## 2023-03-02 RX ADMIN — SODIUM CHLORIDE, PRESERVATIVE FREE 10 ML: 5 INJECTION INTRAVENOUS at 08:19

## 2023-03-02 RX ADMIN — GABAPENTIN 100 MG: 100 CAPSULE ORAL at 20:30

## 2023-03-02 RX ADMIN — ACETAMINOPHEN 650 MG: 325 TABLET ORAL at 13:26

## 2023-03-02 RX ADMIN — DILTIAZEM HYDROCHLORIDE 120 MG: 60 CAPSULE, EXTENDED RELEASE ORAL at 20:30

## 2023-03-02 RX ADMIN — QUETIAPINE FUMARATE 25 MG: 25 TABLET ORAL at 18:18

## 2023-03-02 RX ADMIN — INSULIN LISPRO 4 UNITS: 100 INJECTION, SOLUTION INTRAVENOUS; SUBCUTANEOUS at 13:23

## 2023-03-02 ASSESSMENT — PAIN DESCRIPTION - LOCATION
LOCATION: LEG

## 2023-03-02 ASSESSMENT — PAIN SCALES - GENERAL: PAINLEVEL_OUTOF10: 3

## 2023-03-02 ASSESSMENT — PAIN DESCRIPTION - ORIENTATION
ORIENTATION: LEFT;RIGHT
ORIENTATION: LEFT

## 2023-03-02 ASSESSMENT — PAIN DESCRIPTION - DESCRIPTORS
DESCRIPTORS: ACHING
DESCRIPTORS: ACHING

## 2023-03-02 NOTE — PROGRESS NOTES
Physical Therapy  Facility/Department: [facility] MED SURG  Physical Therapy Treatement    Name: Diamond Pemberton  : 1960  MRN: [id number]  Date of Service: 3/2/2023    Discharge Recommendations:  Patient would benefit from continued therapy after discharge. Pt currently functioning below baseline. Recommend daily inpatient skilled therapy at time of discharge to maximize long term outcomes and prevent re-admission. Please refer to AM-PAC score for current level of function. Patient Diagnosis(es): The encounter diagnosis was Generalized weakness. Past Medical History:  has a past medical history of Cerebral artery occlusion with cerebral infarction ([facility] [address] Diabetes mellitus ([facility] [address] HTN (hypertension), Mental disability, Pulmonary emboli ([facility] [address] and SVT (supraventricular tachycardia) ([facility] [address] Past Surgical History:  has no past surgical history on file. Assessment   Assessment: Brad continues to require max assist x 2 with aline pang. Patient will benefit from continued PT to improve transfers.   Therapy Prognosis: Fair  Decision Making: High Complexity  Barriers to Learning: MRDD  Activity Tolerance  Activity Tolerance: Treatment limited secondary to decreased cognition     Plan   Physcial Therapy Plan  General Plan: 5-7 times per week  Specific Instructions for Next Treatment: Brenda Lipoma  Current Treatment Recommendations: Strengthening, ROM, Balance training, Functional mobility training, Transfer training, Endurance training, Home exercise program, Safety education & training, Patient/Caregiver education & training, Therapeutic activities  Safety Devices  Type of Devices: Gait belt, Patient at risk for falls, Bed alarm in place, Call light within reach, Nurse notified, All fall risk precautions in place     Restrictions  Restrictions/Precautions  Restrictions/Precautions: Fall Risk, Up as Tolerated, Bed Alarm, General Precautions  Position Activity Restriction  Other position/activity restrictions: MRDD Subjective   General  Chart Reviewed: Yes  Patient assessed for rehabilitation services?: Yes  Additional Pertinent Hx: MRDD  Response To Previous Treatment: Not applicable  Family / Caregiver Present: No  Diagnosis: weakness  Follows Commands: Within Functional Limits  General Comment  Comments: Patient resting in bed upon arrival.  Subjective  Subjective: Patient yells out in pain when moving BLE (right more than left), but compiant with therapy. Cognition   Orientation  Overall Orientation Status: Impaired  Orientation Level: Oriented to person;Disoriented to time;Disoriented to situation;Disoriented to place  Cognition  Overall Cognitive Status: Exceptions  Arousal/Alertness: Appropriate responses to stimuli  Following Commands: Inconsistently follows commands  Attention Span: Difficulty attending to directions  Memory: Decreased recall of biographical Information;Decreased recall of precautions;Decreased recall of recent events  Safety Judgement: Decreased awareness of need for safety;Decreased awareness of need for assistance  Problem Solving: Assistance required to generate solutions;Assistance required to implement solutions;Decreased awareness of errors;Assistance required to correct errors made  Insights: Decreased awareness of deficits  Initiation: Requires cues for all  Sequencing: Requires cues for all  Cognition Comment: MRDD; speech difficult to understand. Objective   Heart Rate: 76  Heart Rate Source: Monitor  BP: 111/65  BP Location: Left upper arm  Patient Position: Supine  MAP (Calculated): 80  Resp: 18  SpO2: 97 %  O2 Device: None (Room air)     Observation/Palpation  Posture: Fair  Observation: Patient demonstrates pain when moving legs, but did not cry out with standing.         AROM RLE (degrees)  RLE General AROM: hip and knee 0-90, ankle DF WFL  AROM LLE (degrees)  LLE General AROM: hip and knee 0-90, ankle DF [facility]  Strength Other  Other: Not able to MMT due to cognition Transfers  Sit to Stand: Maximum Assistance;2 Person Assistance  Stand to Sit: Maximum Assistance;2 Person Assistance  Comment: Patient required max assist x 2 with laine stedy and bed elevated 6 inches. One therapist stood infront to pull patient forward. Only able to maintain standing for about 10 seconds. Ambulation  WB Status:  (Not able)     Balance  Sitting - Static: Fair;+  Sitting - Dynamic: Fair;-  Standing - Static: Poor (laine stedy)  Standing - Dynamic:  (Not able)  Exercise Treatment: AAROM BLE x 10 reps  in seated position, requires 2nd person for seated balance for LE ther-ex. OutComes Score      AM-PAC Score  AM-PAC Inpatient Mobility Raw Score : 8 (03/02/23 1259)  AM-PAC Inpatient T-Scale Score : 28.52 (03/02/23 1259)  Mobility Inpatient CMS 0-100% Score: 86.62 (03/02/23 1259)  Mobility Inpatient CMS G-Code Modifier : CM (03/02/23 1259)       Goals  Short Term Goals  Time Frame for Short Term Goals: 12 visits:  Short Term Goal 1: Pt. to requrie mod A for bed mob. Short Term Goal 2: Pt to requrie mod A x2 for sit to stand transfer with RW  Short Term Goal 3: Pt. to takes steps along EOB with RW, mod A x 2  Short Term Goal 4: Pt. to have good dynamic sitting balance  Short Term Goal 5: Pt. to tolerate 25+ min. of PT daily for ther ex/ balance training/ functional mob. training  Patient Goals   Patient Goals : Pt. states she knows she is going to a \"nursing home\"       Education  Patient Education  Education Given To: Patient  Education Provided: Transfer Training  Education Method: Demonstration;Verbal  Barriers to Learning: Cognition  Education Outcome: Continued education needed  Co-treatment with OT warranted secondary to decreased safety and independence requiring 2 skilled therapy professionals to address individual discipline's goals. PT addressing pre gait trunk strengthening, weight shifting prior to transfers, transfer training, and postural control in sitting.      Therapy Time Individual Concurrent Group Co-treatment   Time In 1118         Time Out 1141         Minutes 23         Timed Code Treatment Minutes: [address], PT

## 2023-03-02 NOTE — PROGRESS NOTES
Transitions of Care Pharmacy Service   Medication Review    The patient's list of current home medications has been reviewed. Source(s) of information: Surescripts refill report, PCP office      PROVIDER ACTION REQUESTED  Medications that need to be addressed by a physician/nurse practitioner:    Medication Action Requested        Home med list is ready for physician review         Please feel free to call me with any questions about this encounter. Thank you.     Maureen Patel [facility]   Transitions of Care Pharmacy Service  Phone:  [phone]  Fax: [phone]      Electronically signed by Maureen Patel [facility] on 3/1/2023 at 7:33 PM           Medications Prior to Admission:   dilTIAZem (CARDIZEM 12 HR) 120 MG extended release capsule, Take 120 mg by mouth 2 times daily  potassium chloride (KLOR-CON M) 20 MEQ extended release tablet, Take 20 mEq by mouth daily  Icosapent Ethyl (VASCEPA) 1 g CAPS capsule, Take 2 capsules by mouth 2 times daily  furosemide (LASIX) 40 MG tablet, Take 1 tablet by mouth daily  methenamine (HIPREX) 1 g tablet, Take 1 g by mouth 2 times daily (with meals)  apixaban (ELIQUIS) 5 MG TABS tablet, Take 5 mg by mouth 2 times daily  atorvastatin (LIPITOR) 20 MG tablet, Take 20 mg by mouth nightly  cyanocobalamin 1000 MCG tablet, Take 1,000 mcg by mouth daily  fenofibrate (TRICOR) 145 MG tablet, Take 145 mg by mouth daily  glimepiride (AMARYL) 4 MG tablet, Take 4 mg by mouth with breakfast and with evening meal  hydrOXYzine HCl (ATARAX) 25 MG tablet, Take 25 mg by mouth nightly as needed  metFORMIN (GLUCOPHAGE) 500 MG tablet, Take 500 mg by mouth 2 times daily (with meals)  insulin aspart (NOVOLOG FLEXPEN) 100 UNIT/ML injection pen, Inject into the skin 3 times daily (with meals)  metoprolol succinate (TOPROL XL) 50 MG extended release tablet, Take 50 mg by mouth daily  MYRBETRIQ 25 MG TB24, Take 25 mg by mouth daily  QUEtiapine (SEROQUEL) 25 MG tablet, Take 25 mg by mouth every evening  sertraline (ZOLOFT) 25 MG tablet, Take 25 mg by mouth daily  spironolactone (ALDACTONE) 25 MG tablet, Take 25 mg by mouth daily

## 2023-03-02 NOTE — CARE COORDINATION
Social work: No transports available this evening, transportation arranged for 12:00PM Friday to [facility]. Facility and family aware.

## 2023-03-02 NOTE — PLAN OF CARE
Problem: Discharge Planning  Goal: Discharge to home or other facility with appropriate resources  Outcome: Progressing     Problem: Pain  Goal: Verbalizes/displays adequate comfort level or baseline comfort level  Outcome: Progressing     Problem: Confusion  Goal: Confusion, delirium, dementia, or psychosis is improved or at baseline  Description: INTERVENTIONS:  1. Assess for possible contributors to thought disturbance, including medications, impaired vision or hearing, underlying metabolic abnormalities, dehydration, psychiatric diagnoses, and notify attending LIP  2. [place] high risk fall precautions, as indicated  3. Provide frequent short contacts to provide reality reorientation, refocusing and direction  4. Decrease environmental stimuli, including noise as appropriate  5. Monitor and intervene to maintain adequate nutrition, hydration, elimination, sleep and activity  6. If unable to ensure safety without constant attention obtain sitter and review sitter guidelines with assigned personnel  7. Initiate Psychosocial CNS and Spiritual Care consult, as indicated  Outcome: Progressing     Problem: Skin/Tissue Integrity  Goal: Absence of new skin breakdown  Description: 1.  Monitor for areas of redness and/or skin breakdown  2.  Assess vascular access sites hourly  3.  Every 4-6 hours minimum:  Change oxygen saturation probe site  4.  Every 4-6 hours:  If on nasal continuous positive airway pressure, respiratory therapy assess nares and determine need for appliance change or resting period.  Outcome: Progressing     Problem: Safety - Adult  Goal: Free from fall injury  Outcome: Progressing     Problem: Chronic Conditions and Co-morbidities  Goal: Patient's chronic conditions and co-morbidity symptoms are monitored and maintained or improved  Outcome: Progressing

## 2023-03-02 NOTE — PROGRESS NOTES
Occupational Therapy  Facility/Department: [facility] MED SURG  Occupational Therapy Daily Treatment Note    Name: Jodie Parish  : 1960  MRN: [id number]  Date of Service: 3/2/2023    Discharge Recommendations:  Patient would benefit from continued therapy after discharge   Pt currently functioning below baseline. Recommend daily inpatient skilled therapy at time of discharge to maximize long term outcomes and prevent re-admission. Please refer to AM-PAC score for current level of function. RN reports patient is medically stable for therapy treatment this date. Chart reviewed prior to treatment and patient is agreeable for therapy. All lines intact and patient positioned comfortably at end of treatment. All patient needs addressed prior to ending therapy session. Patient Diagnosis(es): The encounter diagnosis was Generalized weakness. Past Medical History:  has a past medical history of Cerebral artery occlusion with cerebral infarction ([facility] [address] Diabetes mellitus ([facility] [address] HTN (hypertension), Mental disability, Pulmonary emboli ([facility] [address] and SVT (supraventricular tachycardia) ([facility] [address] Past Surgical History:  has no past surgical history on file. Assessment   Performance deficits / Impairments: Decreased functional mobility ; Decreased ADL status; Decreased strength;Decreased cognition;Decreased safe awareness;Decreased balance;Decreased posture;Decreased endurance;Decreased coordination  Assessment: .   Prognosis: Fair  Decision Making: High Complexity  REQUIRES OT FOLLOW-UP: Yes  Activity Tolerance  Activity Tolerance: Patient limited by fatigue;Treatment limited secondary to decreased cognition;Patient limited by pain        Plan   Occupational Therapy Plan  Times Per Week: 4-5x/week  Current Treatment Recommendations: Strengthening, Balance training, Functional mobility training, Self-Care / ADL, Safety education & training, Endurance training, Patient/Caregiver education & training, Equipment evaluation, education, & procurement, Positioning, Cognitive/Perceptual training, Cognitive reorientation     Restrictions  Restrictions/Precautions  Restrictions/Precautions: Fall Risk, Up as Tolerated, Bed Alarm, General Precautions  Position Activity Restriction  Other position/activity restrictions: MRDD    Subjective   General  Chart Reviewed: Yes  Patient assessed for rehabilitation services?: Yes  Family / Caregiver Present: No     Social/Functional History  Social/Functional History  Lives With: Family, Home care staff  Type of Home: House  Home Layout: One level  Home Access: Stairs to enter with rails  Entrance Stairs - Number of Steps: 3  Entrance Stairs - Rails: Both  Bathroom Equipment: Shower chair  Home Equipment: Druscilla Covert, 4 wheeled, Walker, standard  Has the patient had two or more falls in the past year or any fall with injury in the past year?: Yes (fall history)  Receives Help From: Home health  ADL Assistance: Needs assistance  Homemaking Assistance: Needs assistance (HHAs and sister?)  Ambulation Assistance: Needs assistance  Transfer Assistance: Needs assistance  Active : No  Patient's  Info: sister Author David  Occupation: Unemployed  Type of Occupation: Pt. states she works at [employer]"  Additional Comments: Pt. readmitted 2/19 after d/c home with home care 2/9/23. Per previous chart, pt. has aides 24/7 from [facility]. Pt. with \"mental disability\" per chart. Speech difficult to understand. RN had obtained some social functional information from pt's sister Author David who called the hospital from a cruise ship last admission. THIS ADMISSION- It appears pt. was just at [facility] and when they took her home she couldn't amb. so was brought directly to [address] [place] [address] as [facility] on bypass. Per chart it appears pt. has had 4 ER visits in last month.        Objective   Heart Rate: 76  Heart Rate Source: Monitor  BP: 111/65  BP Location: Left upper arm  Patient Position: Supine  MAP (Calculated): 80  Resp: 18  SpO2: 97 %  O2 Device: None (Room air)          Observation/Palpation  Posture: Fair  Observation: Patient demonstrates pain when moving legs, but did not cry out with standing. Safety Devices  Type of Devices: Gait belt;Patient at risk for falls; Bed alarm in place;Call light within reach;Nurse notified; All fall risk precautions in place  Balance  Sitting: With support (pt sat EOB ~10 min. Mod A initially to gain balance with posterior lean)  Standing: With support (HEAVY max A x 2 for brief  [place]. difficulty bringing hips forward to achieve full standing. Pt attempitng to sit almost immediately.)  Gait  Interventions: Safety awareness training;Verbal cues; Tactile cues; Visual cues     AROM: Within functional limits  Strength: Generally decreased, functional  Coordination: Generally decreased, functional  Tone: Normal  Sensation:  (unable to assess)  ADL  Feeding: Setup;Supervision  Grooming: Moderate assistance  UE Bathing: Maximum assistance  LE Bathing: Maximum assistance  UE Dressing: Maximum assistance  LE Dressing: Dependent/Total  Toileting: Dependent/Total  Additional Comments: pt needing max cues to intitiate and sequence all tasks     Activity Tolerance  Activity Tolerance: Treatment limited secondary to decreased cognition  Bed mobility  Supine to Sit: Maximum assistance;2 Person assistance  Sit to Supine: Maximum assistance;2 Person assistance  Scooting: Maximal assistance;2 Person assistance  Transfers  Sit to stand: Maximum assistance;2 Person assistance  Stand to sit: 2 Person assistance;Maximum assistance  Transfer Comments: pt unable to stand with [place] and max A x2 from bed in standard position. With bed raised, pt able to achieve stand with heavy max A x 2 and max cues to pull with arms. Pt unable to stand long enough to use seat flaps on device.  . Pt would be unable to stand from any lower of surface (unable to leave in recliner which was original goal). Pt would need lift for any functional transfer     Cognition  Overall Cognitive Status: Exceptions  Arousal/Alertness: Appropriate responses to stimuli  Following Commands: Inconsistently follows commands  Attention Span: Difficulty attending to directions  Memory: Decreased recall of biographical Information;Decreased recall of precautions;Decreased recall of recent events  Safety Judgement: Decreased awareness of need for safety;Decreased awareness of need for assistance  Problem Solving: Assistance required to generate solutions;Assistance required to implement solutions;Decreased awareness of errors;Assistance required to correct errors made  Insights: Decreased awareness of deficits  Initiation: Requires cues for all  Sequencing: Requires cues for all  Cognition Comment: MRDD; speech difficult to understand. Orientation  Overall Orientation Status: Impaired  Orientation Level: Oriented to person;Disoriented to time;Disoriented to situation;Disoriented to place                  Education Given To: Patient  Education Provided: Role of Therapy;Plan of Care;Home Exercise Program;Precautions; ADL Adaptive Strategies;Transfer Training;Energy Conservation; Fall Prevention Strategies; Equipment; Family Education  Education Method: Demonstration;Verbal  Barriers to Learning: Cognition  Education Outcome: Continued education needed           AM-PAC Score        AM-[facility] Inpatient Daily Activity Raw Score: 8 (03/02/23 1306)  AM-PAC Inpatient ADL T-Scale Score : 22.86 (03/02/23 1306)  ADL Inpatient CMS 0-100% Score: 85.69 (03/02/23 1306)  ADL Inpatient CMS G-Code Modifier : CM (03/02/23 1306)    [place] Score       Goals  Short Term Goals  Time Frame for Short Term Goals: by discharge, pt will  Short Term Goal 1: demo min A x 1 with bed mob with rails/controls  Short Term Goal 2: demo mod A x 2 with ADL transfers with approp AD/DME as needed with good safety  Short Term Goal 3: demo SBA with sitting balance at EOB x 15 min for simple ADL completion and prep for transfers  Short Term Goal 4: participate in B UE HEP for strengthening/ROM to inc. function with transfers/AdLs/mob  Short Term Goal 5: demo SBA with simple grooming task and UB ADLs with mod cues for initiation/sequencing  Patient Goals   Patient goals : \"I am going to nursing home\"       Therapy Time   Individual Concurrent Group Co-treatment   Time In 1120         Time Out 2287         Minutes 25            Co-treatment with PT warranted secondary to decreased safety and independence requiring 2 skilled therapy professionals to address individual discipline's goals. OT addressing preparation for ADL transfer, sitting balance for increased ADL performance, sitting/activity tolerance, functional reaching, environmental safety/scanning, fall prevention, functional mobility for ADL transfers, ability to sequence and follow directions, bed mobility tech, and functional UE strength.      Umberto Benz, OT

## 2023-03-03 ENCOUNTER — APPOINTMENT (OUTPATIENT)
Dept: CT IMAGING | Age: 63
DRG: 064 | End: 2023-03-03
Payer: MEDICARE

## 2023-03-03 ENCOUNTER — APPOINTMENT (OUTPATIENT)
Dept: GENERAL RADIOLOGY | Age: 63
DRG: 064 | End: 2023-03-03
Payer: MEDICARE

## 2023-03-03 ENCOUNTER — APPOINTMENT (OUTPATIENT)
Dept: MRI IMAGING | Age: 63
DRG: 064 | End: 2023-03-03
Payer: MEDICARE

## 2023-03-03 LAB
ABSOLUTE EOS #: 0.05 K/UL (ref 0–0.44)
ABSOLUTE IMMATURE GRANULOCYTE: 0.23 K/UL (ref 0–0.3)
ABSOLUTE LYMPH #: 1.5 K/UL (ref 1.1–3.7)
ABSOLUTE MONO #: 0.66 K/UL (ref 0.1–1.2)
ANION GAP SERPL CALCULATED.3IONS-SCNC: 11 MMOL/L (ref 9–17)
BASOPHILS # BLD: 0 % (ref 0–2)
BASOPHILS ABSOLUTE: 0.03 K/UL (ref 0–0.2)
BUN SERPL-MCNC: 27 MG/DL (ref 8–23)
BUN/CREAT BLD: 30 (ref 9–20)
CALCIUM SERPL-MCNC: 8.7 MG/DL (ref 8.6–10.4)
CHLORIDE SERPL-SCNC: 100 MMOL/L (ref 98–107)
CO2 SERPL-SCNC: 21 MMOL/L (ref 20–31)
CREAT SERPL-MCNC: 0.89 MG/DL (ref 0.5–0.9)
CRP SERPL HS-MCNC: 153.1 MG/L (ref 0–5)
EOSINOPHILS RELATIVE PERCENT: 0 % (ref 1–4)
EST. AVERAGE GLUCOSE BLD GHB EST-MCNC: 174 MG/DL
GFR SERPL CREATININE-BSD FRML MDRD: >60 ML/MIN/1.73M2
GLUCOSE BLD-MCNC: 178 MG/DL (ref 65–105)
GLUCOSE BLD-MCNC: 205 MG/DL (ref 65–105)
GLUCOSE BLD-MCNC: 206 MG/DL (ref 65–105)
GLUCOSE BLD-MCNC: 237 MG/DL (ref 65–105)
GLUCOSE BLD-MCNC: 268 MG/DL (ref 65–105)
GLUCOSE SERPL-MCNC: 207 MG/DL (ref 70–99)
HBA1C MFR BLD: 7.7 % (ref 4–6)
HCT VFR BLD AUTO: 36.3 % (ref 36.3–47.1)
HGB BLD-MCNC: 11.7 G/DL (ref 11.9–15.1)
IMMATURE GRANULOCYTES: 2 %
LYMPHOCYTES # BLD: 10 % (ref 24–43)
MCH RBC QN AUTO: 26.8 PG (ref 25.2–33.5)
MCHC RBC AUTO-ENTMCNC: 32.2 G/DL (ref 28.4–34.8)
MCV RBC AUTO: 83.1 FL (ref 82.6–102.9)
MONOCYTES # BLD: 5 % (ref 3–12)
NRBC AUTOMATED: 0 PER 100 WBC
PDW BLD-RTO: 14.6 % (ref 11.8–14.4)
PLATELET # BLD AUTO: 107 K/UL (ref 138–453)
PMV BLD AUTO: 10.7 FL (ref 8.1–13.5)
POTASSIUM SERPL-SCNC: 4 MMOL/L (ref 3.7–5.3)
RBC # BLD: 4.37 M/UL (ref 3.95–5.11)
RBC # BLD: ABNORMAL 10*6/UL
SARS-COV-2 RDRP RESP QL NAA+PROBE: NOT DETECTED
SEG NEUTROPHILS: 83 % (ref 36–65)
SEGMENTED NEUTROPHILS ABSOLUTE COUNT: 11.94 K/UL (ref 1.5–8.1)
SODIUM SERPL-SCNC: 132 MMOL/L (ref 135–144)
SPECIMEN DESCRIPTION: NORMAL
WBC # BLD AUTO: 14.4 K/UL (ref 3.5–11.3)

## 2023-03-03 PROCEDURE — 85025 COMPLETE CBC W/AUTO DIFF WBC: CPT

## 2023-03-03 PROCEDURE — 70553 MRI BRAIN STEM W/O & W/DYE: CPT

## 2023-03-03 PROCEDURE — 6370000000 HC RX 637 (ALT 250 FOR IP): Performed by: FAMILY MEDICINE

## 2023-03-03 PROCEDURE — 93970 EXTREMITY STUDY: CPT

## 2023-03-03 PROCEDURE — 86225 DNA ANTIBODY NATIVE: CPT

## 2023-03-03 PROCEDURE — 2580000003 HC RX 258: Performed by: FAMILY MEDICINE

## 2023-03-03 PROCEDURE — 83036 HEMOGLOBIN GLYCOSYLATED A1C: CPT

## 2023-03-03 PROCEDURE — 80048 BASIC METABOLIC PNL TOTAL CA: CPT

## 2023-03-03 PROCEDURE — 70450 CT HEAD/BRAIN W/O DYE: CPT

## 2023-03-03 PROCEDURE — 6360000004 HC RX CONTRAST MEDICATION: Performed by: PSYCHIATRY & NEUROLOGY

## 2023-03-03 PROCEDURE — A9579 GAD-BASE MR CONTRAST NOS,1ML: HCPCS | Performed by: PSYCHIATRY & NEUROLOGY

## 2023-03-03 PROCEDURE — 6360000002 HC RX W HCPCS: Performed by: FAMILY MEDICINE

## 2023-03-03 PROCEDURE — 82947 ASSAY GLUCOSE BLOOD QUANT: CPT

## 2023-03-03 PROCEDURE — 86038 ANTINUCLEAR ANTIBODIES: CPT

## 2023-03-03 PROCEDURE — 87635 SARS-COV-2 COVID-19 AMP PRB: CPT

## 2023-03-03 PROCEDURE — 83516 IMMUNOASSAY NONANTIBODY: CPT

## 2023-03-03 PROCEDURE — 36415 COLL VENOUS BLD VENIPUNCTURE: CPT

## 2023-03-03 PROCEDURE — 6370000000 HC RX 637 (ALT 250 FOR IP): Performed by: NURSE PRACTITIONER

## 2023-03-03 PROCEDURE — 86140 C-REACTIVE PROTEIN: CPT

## 2023-03-03 PROCEDURE — 99223 1ST HOSP IP/OBS HIGH 75: CPT | Performed by: PSYCHIATRY & NEUROLOGY

## 2023-03-03 PROCEDURE — 71045 X-RAY EXAM CHEST 1 VIEW: CPT

## 2023-03-03 PROCEDURE — 1200000000 HC SEMI PRIVATE

## 2023-03-03 PROCEDURE — 2580000003 HC RX 258: Performed by: PSYCHIATRY & NEUROLOGY

## 2023-03-03 PROCEDURE — 2580000003 HC RX 258: Performed by: NURSE PRACTITIONER

## 2023-03-03 RX ORDER — 0.9 % SODIUM CHLORIDE 0.9 %
500 INTRAVENOUS SOLUTION INTRAVENOUS ONCE
Status: COMPLETED | OUTPATIENT
Start: 2023-03-03 | End: 2023-03-03

## 2023-03-03 RX ORDER — ASPIRIN 325 MG
325 TABLET ORAL DAILY
Status: DISCONTINUED | OUTPATIENT
Start: 2023-03-03 | End: 2023-03-09 | Stop reason: HOSPADM

## 2023-03-03 RX ORDER — KETOROLAC TROMETHAMINE 15 MG/ML
15 INJECTION, SOLUTION INTRAMUSCULAR; INTRAVENOUS ONCE
Status: COMPLETED | OUTPATIENT
Start: 2023-03-03 | End: 2023-03-03

## 2023-03-03 RX ORDER — LORAZEPAM 1 MG/1
1 TABLET ORAL ONCE
Status: COMPLETED | OUTPATIENT
Start: 2023-03-03 | End: 2023-03-03

## 2023-03-03 RX ORDER — ACETAMINOPHEN 325 MG/1
650 TABLET ORAL EVERY 4 HOURS PRN
Status: DISCONTINUED | OUTPATIENT
Start: 2023-03-03 | End: 2023-03-09 | Stop reason: HOSPADM

## 2023-03-03 RX ORDER — SODIUM CHLORIDE 9 MG/ML
INJECTION, SOLUTION INTRAVENOUS CONTINUOUS
Status: DISCONTINUED | OUTPATIENT
Start: 2023-03-03 | End: 2023-03-06

## 2023-03-03 RX ORDER — SODIUM CHLORIDE 0.9 % (FLUSH) 0.9 %
10 SYRINGE (ML) INJECTION ONCE
Status: COMPLETED | OUTPATIENT
Start: 2023-03-03 | End: 2023-03-03

## 2023-03-03 RX ADMIN — ACETAMINOPHEN 650 MG: 325 TABLET ORAL at 06:11

## 2023-03-03 RX ADMIN — ACETAMINOPHEN 650 MG: 325 TABLET ORAL at 18:56

## 2023-03-03 RX ADMIN — SODIUM CHLORIDE, PRESERVATIVE FREE 10 ML: 5 INJECTION INTRAVENOUS at 10:08

## 2023-03-03 RX ADMIN — GABAPENTIN 100 MG: 100 CAPSULE ORAL at 16:46

## 2023-03-03 RX ADMIN — ASPIRIN 325 MG: 325 TABLET ORAL at 18:56

## 2023-03-03 RX ADMIN — METOPROLOL SUCCINATE 50 MG: 50 TABLET, EXTENDED RELEASE ORAL at 08:39

## 2023-03-03 RX ADMIN — INSULIN LISPRO 2 UNITS: 100 INJECTION, SOLUTION INTRAVENOUS; SUBCUTANEOUS at 17:42

## 2023-03-03 RX ADMIN — ANTI-FUNGAL POWDER MICONAZOLE NITRATE TALC FREE: 1.42 POWDER TOPICAL at 10:13

## 2023-03-03 RX ADMIN — KETOROLAC TROMETHAMINE 15 MG: 15 INJECTION, SOLUTION INTRAMUSCULAR; INTRAVENOUS at 10:09

## 2023-03-03 RX ADMIN — SERTRALINE 25 MG: 25 TABLET, FILM COATED ORAL at 08:39

## 2023-03-03 RX ADMIN — FUROSEMIDE 40 MG: 40 TABLET ORAL at 08:40

## 2023-03-03 RX ADMIN — GADOTERIDOL 20 ML: 279.3 INJECTION, SOLUTION INTRAVENOUS at 16:09

## 2023-03-03 RX ADMIN — SODIUM CHLORIDE, PRESERVATIVE FREE 10 ML: 5 INJECTION INTRAVENOUS at 16:11

## 2023-03-03 RX ADMIN — METFORMIN HYDROCHLORIDE 500 MG: 500 TABLET, FILM COATED ORAL at 08:39

## 2023-03-03 RX ADMIN — LORAZEPAM 1 MG: 1 TABLET ORAL at 15:26

## 2023-03-03 RX ADMIN — INSULIN LISPRO 4 UNITS: 100 INJECTION, SOLUTION INTRAVENOUS; SUBCUTANEOUS at 12:51

## 2023-03-03 RX ADMIN — ANTI-FUNGAL POWDER MICONAZOLE NITRATE TALC FREE: 1.42 POWDER TOPICAL at 18:40

## 2023-03-03 RX ADMIN — DILTIAZEM HYDROCHLORIDE 120 MG: 60 CAPSULE, EXTENDED RELEASE ORAL at 08:39

## 2023-03-03 RX ADMIN — SODIUM CHLORIDE 500 ML: 9 INJECTION, SOLUTION INTRAVENOUS at 13:03

## 2023-03-03 RX ADMIN — INSULIN LISPRO 2 UNITS: 100 INJECTION, SOLUTION INTRAVENOUS; SUBCUTANEOUS at 08:39

## 2023-03-03 RX ADMIN — METFORMIN HYDROCHLORIDE 500 MG: 500 TABLET, FILM COATED ORAL at 16:46

## 2023-03-03 RX ADMIN — SPIRONOLACTONE 25 MG: 25 TABLET ORAL at 08:39

## 2023-03-03 RX ADMIN — APIXABAN 5 MG: 5 TABLET, FILM COATED ORAL at 08:39

## 2023-03-03 RX ADMIN — QUETIAPINE FUMARATE 25 MG: 25 TABLET ORAL at 18:57

## 2023-03-03 RX ADMIN — GABAPENTIN 100 MG: 100 CAPSULE ORAL at 08:39

## 2023-03-03 RX ADMIN — GLIPIZIDE 2.5 MG: 5 TABLET ORAL at 06:18

## 2023-03-03 RX ADMIN — FENOFIBRATE 54 MG: 54 TABLET ORAL at 08:39

## 2023-03-03 ASSESSMENT — PAIN SCALES - GENERAL: PAINLEVEL_OUTOF10: 10

## 2023-03-03 ASSESSMENT — PAIN DESCRIPTION - LOCATION: LOCATION: HEAD

## 2023-03-03 NOTE — PROGRESS NOTES
[facility]    Adult Hospitalist      Name: Jay Be  MRN: [id number]     Acct: [de-identified]  Room: 2106/2106-01    Admit Date: 2/28/2023  5:51 PM  PCP: India Zavala MD    Primary Problem  Principal Problem:    Weakness  Resolved Problems:    * No resolved hospital problems. *        Assesment:     Unable to ambulate  Weakness  Atrial fibrillation with controlled rate  Chronic diastolic congestive heart failure  Mild mitral stenosis  Moderate left ventricular hypertrophy on echo 2/6/2023  Cognitive impairment  Essential hypertension  Diabetes mellitus type 2  Hyperlipidemia  Urgent continence  Morbid obesity with BMI 38.3  Fever  Headache  Acute microinfarcts caudate head and right thalamus         Plan:     Admit to MedSurg  Monitor vitals closely  Keep SPO2 above 90%  I's and O's  IV  Pain control  Antiemetics as needed  Reviewed chart and paperwork  Discussed with medical staff  PT/OT  Consult social work  Patient requires physical rehabilitation. Apparently her POA refused SNF during her recent admission to the [address] [place] [address] Dr before meals and at bedtime  Insulin sliding scale, medium  Hypoglycemia protocol  Resume essential home medication  Add parameters  CBC, BMP  Gabapentin  X-ray lumbar  Consult neurology  Plan discharge to skilled nursing facility tomorrow-hold  Aspirin  Neurology plan lumbar puncture to rule out vasculitis on Monday  Hold aspirin and Eliquis on 3/4/2023  DVT and GI prophylaxis. Chief Complaint:     Chief Complaint   Patient presents with    Fatigue     Unable to walk or stand.  Discharged from [facility] today         History of Present Illness:        Patient seen and examined at bedside  Last 24-hour events reviewed with nursing  Patient complained of acute right-sided headache earlier today  Pain medication were ineffective  Patient also noted to have fever less than 100 °F  Tylenol as needed for that  CT brain canceled  MRI brain showed micro emboli  Patient denies chest pain, dyspnea   Denies vomiting or diarrhea  Denies abdominal pain      Initial HPI  Natividad Mathew is a 60 y.o.  female who presents with Fatigue (Unable to walk or stand. Discharged from [facility] today)    Admitted to the emergency room presented with family with complaints of generalized weakness. Patient speaks routinely in 1-2 word sentences and has not been able to provide any significant history herself. Most of the information has been obtained from her family members and POA through the emergency room personnel and nursing staff. Has not been able to ambulate with assistance of PT either. She requires at least 2 assist to transfer from bed to chair. Patient is not in any apparent distress. She has not complained of any chest pain, headache, vision change, abdominal pain, dysuria, diarrhea or joint swelling    I have personally reviewed the past medical history, past surgical history, medications, social history, and family history, and summarized in the note. Review of Systems:     All 10 point system is reviewed and negative otherwise mentioned in HPI. Past Medical History:     Past Medical History:   Diagnosis Date    Cerebral artery occlusion with cerebral infarction ([facility] [address]     Diabetes mellitus ([facility]     HTN (hypertension)     Mental disability     Pulmonary emboli (HCC)     SVT (supraventricular tachycardia) ([facility] [address]         Past Surgical History:     No past surgical history on file. Medications Prior to Admission:       Prior to Admission medications    Medication Sig Start Date End Date Taking?  Authorizing Provider   dilTIAZem (CARDIZEM 12 HR) 120 MG extended release capsule Take 120 mg by mouth 2 times daily   Yes Historical Provider, MD   potassium chloride (KLOR-CON M) 20 MEQ extended release tablet Take 20 mEq by mouth daily   Yes Historical Provider, MD   Icosapent Ethyl (VASCEPA) 1 g CAPS capsule Take 2 capsules by mouth 2 times daily   Yes Historical Provider, MD   furosemide (LASIX) 40 MG tablet Take 1 tablet by mouth daily 2/10/23   Pito Valladares MD   methenamine (HIPREX) 1 g tablet Take 1 g by mouth 2 times daily (with meals)    Historical Provider, MD   apixaban (ELIQUIS) 5 MG TABS tablet Take 5 mg by mouth 2 times daily 1/29/21   Historical Provider, MD   atorvastatin (LIPITOR) 20 MG tablet Take 20 mg by mouth nightly 4/17/21   Historical Provider, MD   cyanocobalamin 1000 MCG tablet Take 1,000 mcg by mouth daily 4/17/21   Historical Provider, MD   fenofibrate (TRICOR) 145 MG tablet Take 145 mg by mouth daily 1/26/23   Historical Provider, MD   glimepiride (AMARYL) 4 MG tablet Take 4 mg by mouth with breakfast and with evening meal 1/26/23   Historical Provider, MD   hydrOXYzine HCl (ATARAX) 25 MG tablet Take 25 mg by mouth nightly as needed 2/4/21   Historical Provider, MD   metFORMIN (GLUCOPHAGE) 500 MG tablet Take 500 mg by mouth 2 times daily (with meals) 1/26/23   Historical Provider, MD   insulin aspart (NOVOLOG FLEXPEN) 100 UNIT/ML injection pen Inject into the skin 3 times daily (with meals) 12/3/19   Historical Provider, MD   metoprolol succinate (TOPROL XL) 50 MG extended release tablet Take 50 mg by mouth daily 1/26/23   Historical Provider, MD   MYRBETRIQ 25 MG TB24 Take 25 mg by mouth daily 1/26/23   Historical Provider, MD   QUEtiapine (SEROQUEL) 25 MG tablet Take 25 mg by mouth every evening 1/26/23   Historical Provider, MD   sertraline (ZOLOFT) 25 MG tablet Take 25 mg by mouth daily 1/26/23   Historical Provider, MD   spironolactone (ALDACTONE) 25 MG tablet Take 25 mg by mouth daily 1/26/23   Historical Provider, MD        Allergies:       Patient has no known allergies. Social History:     Tobacco:    reports that she has never smoked. She has never used smokeless tobacco.  Alcohol:      reports that she does not currently use alcohol. Drug Use:  reports that she does not currently use drugs.     Family History:     No family history on file. Physical Exam:     Vitals:  /67   Pulse 82   Temp 98.4 °F (36.9 °C) (Axillary)   Resp 22   Ht 5' 11\" (1.803 m)   Wt 249 lb 14.4 oz (113.4 kg)   SpO2 93%   BMI 34.85 kg/m²   Temp (24hrs), Av.3 °F (36.8 °C), Min:97.3 °F (36.3 °C), Max:99.5 °F (37.5 °C)          General appearance - lethargic, and in mild acute distress, obese.   Complaining of right-sided headache  Mental status -lethargic  Head - normocephalic and atraumatic  Eyes - pupils equal and reactive though dilated, extraocular eye movements intact, conjunctiva clear  Ears - hearing appears to be intact  Nose - no drainage noted  Mouth - mucous membranes moist  Neck - supple, no carotid bruits, thyroid not palpable  Chest -reduced breath sounds bilateral, clear to auscultation, normal effort  Heart - normal rate, regular rhythm, no murmur  Abdomen - soft, obese, nontender, nondistended, bowel sounds present all four quadrants, no masses, hepatomegaly or splenomegaly  Neurological -lethargic  Extremities - no pedal edema or calf pain with palpation  Skin - no gross lesions, rashes, or induration noted        Data:     Labs:    Hematology:  Recent Labs     23   WBC 14.8* 14.3* 14.4*   RBC 4.48 4.57 4.37   HGB 12.1 12.5 11.7*   HCT 37.4 38.1 36.3   MCV 83.5 83.4 83.1   MCH 27.0 27.4 26.8   MCHC 32.4 32.8 32.2   RDW 14.6* 14.6* 14.6*    129* 107*   MPV 10.3 10.2 10.7   INR 1.3  --   --        Chemistry:  Recent Labs     23   * 135 132*   K 3.8 4.0 4.0   CL 98 103 100   CO2 22 21 21   GLUCOSE 309* 231* 207*   BUN 39* 29* 27*   CREATININE 1.09* 0.86 0.89   ANIONGAP 11 11 11   LABGLOM 57* >60 >60   CALCIUM 8.9 8.9 8.7       Recent Labs     23  0611 23  1146 23  1603 23  0616 23  0837   POCGLU 219* 281* 248* 220* 206* 205*         Lab Results   Component Value Date    INR 1.3 2023    INR 1.7 02/05/2023    INR 1.1 08/24/2012    PROTIME 16.2 (H) 03/01/2023    PROTIME 19.9 (H) 02/05/2023    PROTIME 11.4 08/24/2012       Lab Results   Component Value Date/Time    SPECIAL NOT REPORTED 08/29/2012 06:42 PM     Lab Results   Component Value Date/Time    CULTURE ESCHERICHIA COLI >078344 CFU/ML (A) 02/07/2023 06:24 PM       No results found for: POCPH, PHART, PH, POCPCO2, RFE6EXI, PCO2, POCPO2, PO2ART, PO2, POCHCO3, IPZ8LYW, HCO3, NBEA, PBEA, BEART, BE, THGBART, THB, ZYG0GCY, IYQH3PWO, I3QHZQXB, O2SAT, FIO2    Radiology:    No results found.      All radiological studies reviewed                Code Status:  Full Code    Electronically signed by Quentin Bustos MD on 3/3/2023 at 10:49 AM     Copy sent to Dr. Radha Naqvi MD    This note was created with the assistance of a speech-recognition program.  Although the intention is to generate a document that actually reflects the content of the visit, no guarantees can be provided that every mistake has been identified and corrected by editing.     Note was updated later by me after  physical examination and  completion of the assessment.

## 2023-03-03 NOTE — PLAN OF CARE
Problem: Skin/Tissue Integrity  Goal: Absence of new skin breakdown  Description: 1. Monitor for areas of redness and/or skin breakdown  2. Assess vascular access sites hourly  3. Every 4-6 hours minimum:  Change oxygen saturation probe site  4. Every 4-6 hours:  If on nasal continuous positive airway pressure, respiratory therapy assess nares and determine need for appliance change or resting period. Outcome: Progressing     Problem: Safety - Adult  Goal: Free from fall injury  Outcome: Progressing     Problem: Pain  Goal: Verbalizes/displays adequate comfort level or baseline comfort level  Outcome: Not Progressing     Problem: Confusion  Goal: Confusion, delirium, dementia, or psychosis is improved or at baseline  Description: INTERVENTIONS:  1. Assess for possible contributors to thought disturbance, including medications, impaired vision or hearing, underlying metabolic abnormalities, dehydration, psychiatric diagnoses, and notify attending LIP  2. [place] high risk fall precautions, as indicated  3. Provide frequent short contacts to provide reality reorientation, refocusing and direction  4. Decrease environmental stimuli, including noise as appropriate  5. Monitor and intervene to maintain adequate nutrition, hydration, elimination, sleep and activity  6. If unable to ensure safety without constant attention obtain sitter and review sitter guidelines with assigned personnel  7.  Initiate Psychosocial CNS and Spiritual Care consult, as indicated  Outcome: Not Progressing

## 2023-03-03 NOTE — PROGRESS NOTES
Located within [facility].,    Adult Hospitalist      Name: Oc Whaley  MRN: [id number]     Acct: [de-identified]  Room: 2106/2106-01    Admit Date: 2/28/2023  5:51 PM  PCP: Bary Nissen, MD    Primary Problem  Principal Problem:    Weakness  Resolved Problems:    * No resolved hospital problems. *        Assesment:     Unable to ambulate  Weakness  Atrial fibrillation with controlled rate  Chronic diastolic congestive heart failure  Mild mitral stenosis  Moderate left ventricular hypertrophy on echo 2/6/2023  Cognitive impairment  Essential hypertension  Diabetes mellitus type 2  Hyperlipidemia  Urgent continence  Morbid obesity with BMI 38.3        Plan:     Admit to MedSurg  Monitor vitals closely  Keep SPO2 above 90%  I's and O's  IV  Pain control  Antiemetics as needed  Reviewed chart and paperwork  Discussed with medical staff  PT/OT  Consult social work  Patient requires physical rehabilitation. Apparently her POA refused SNF during her recent admission to the [address] [place] [address] Dr before meals and at bedtime  Insulin sliding scale, medium  Hypoglycemia protocol  Resume essential home medication  Add parameters  CBC, BMP  Gabapentin  X-ray lumbar  Consult neurology  Plan discharge to skilled nursing facility tomorrow  DVT and GI prophylaxis. Chief Complaint:     Chief Complaint   Patient presents with    Fatigue     Unable to walk or stand.  Discharged from [facility] today         History of Present Illness:        Patient seen and examined at bedside  Last 24-hour events reviewed with nursing  Patient is much more alert today  Able to communicate and 1-2 word sentences  Complains of pain in both legs  POA sister is at bedside  Sister reports that they had taken her from [facility] after discharge but she was unable to stand on her feet at home  She could not take any steps and sat down complaining of no strength in her legs  Patient complains of pain on touching her feet  We have started gabapentin suspecting neuropathy  We will check x-ray lumbar  Consult neurology      Initial HPI  Radha Faustin is a 60 y.o.  female who presents with Fatigue (Unable to walk or stand. Discharged from [facility] today)    Admitted to the emergency room presented with family with complaints of generalized weakness. Patient speaks routinely in 1-2 word sentences and has not been able to provide any significant history herself. Most of the information has been obtained from her family members and POA through the emergency room personnel and nursing staff. Has not been able to ambulate with assistance of PT either. She requires at least 2 assist to transfer from bed to chair. Patient is not in any apparent distress. She has not complained of any chest pain, headache, vision change, abdominal pain, dysuria, diarrhea or joint swelling    I have personally reviewed the past medical history, past surgical history, medications, social history, and family history, and summarized in the note. Review of Systems:     All 10 point system is reviewed and negative otherwise mentioned in HPI. Past Medical History:     Past Medical History:   Diagnosis Date    Cerebral artery occlusion with cerebral infarction ([facility] [address]     Diabetes mellitus ([facility] [address]     HTN (hypertension)     Mental disability     Pulmonary emboli (HCC)     SVT (supraventricular tachycardia) ([facility] [address]         Past Surgical History:     No past surgical history on file. Medications Prior to Admission:       Prior to Admission medications    Medication Sig Start Date End Date Taking?  Authorizing Provider   dilTIAZem (CARDIZEM 12 HR) 120 MG extended release capsule Take 120 mg by mouth 2 times daily   Yes Historical Provider, MD   potassium chloride (KLOR-CON M) 20 MEQ extended release tablet Take 20 mEq by mouth daily   Yes Historical Provider, MD   Icosapent Ethyl (VASCEPA) 1 g CAPS capsule Take 2 capsules by mouth 2 times daily   Yes Historical Provider, MD   furosemide (LASIX) 40 MG tablet Take 1 tablet by mouth daily 2/10/23   Edith Pressley MD   methenamine (HIPREX) 1 g tablet Take 1 g by mouth 2 times daily (with meals)    Historical Provider, MD   apixaban (ELIQUIS) 5 MG TABS tablet Take 5 mg by mouth 2 times daily 1/29/21   Historical Provider, MD   atorvastatin (LIPITOR) 20 MG tablet Take 20 mg by mouth nightly 4/17/21   Historical Provider, MD   cyanocobalamin 1000 MCG tablet Take 1,000 mcg by mouth daily 4/17/21   Historical Provider, MD   fenofibrate (TRICOR) 145 MG tablet Take 145 mg by mouth daily 1/26/23   Historical Provider, MD   glimepiride (AMARYL) 4 MG tablet Take 4 mg by mouth with breakfast and with evening meal 1/26/23   Historical Provider, MD   hydrOXYzine HCl (ATARAX) 25 MG tablet Take 25 mg by mouth nightly as needed 2/4/21   Historical Provider, MD   metFORMIN (GLUCOPHAGE) 500 MG tablet Take 500 mg by mouth 2 times daily (with meals) 1/26/23   Historical Provider, MD   insulin aspart (NOVOLOG FLEXPEN) 100 UNIT/ML injection pen Inject into the skin 3 times daily (with meals) 12/3/19   Historical Provider, MD   metoprolol succinate (TOPROL XL) 50 MG extended release tablet Take 50 mg by mouth daily 1/26/23   Historical Provider, MD   MYRBETRIQ 25 MG TB24 Take 25 mg by mouth daily 1/26/23   Historical Provider, MD   QUEtiapine (SEROQUEL) 25 MG tablet Take 25 mg by mouth every evening 1/26/23   Historical Provider, MD   sertraline (ZOLOFT) 25 MG tablet Take 25 mg by mouth daily 1/26/23   Historical Provider, MD   spironolactone (ALDACTONE) 25 MG tablet Take 25 mg by mouth daily 1/26/23   Historical Provider, MD        Allergies:       Patient has no known allergies. Social History:     Tobacco:    reports that she has never smoked. She has never used smokeless tobacco.  Alcohol:      reports that she does not currently use alcohol. Drug Use:  reports that she does not currently use drugs. Family History:     No family history on file.       Physical Exam:     Vitals:  BP (!) 143/77   Pulse 80   Temp 98.8 °F (37.1 °C) (Axillary)   Resp 18   Ht 5' 11\" (1.803 m)   Wt 246 lb (111.6 kg)   SpO2 98%   BMI 34.31 kg/m²   Temp (24hrs), Av °F (36.7 °C), Min:97.5 °F (36.4 °C), Max:98.8 °F (37.1 °C)          General appearance - lethargic, and in no acute distress, obese  Mental status -lethargic  Head - normocephalic and atraumatic  Eyes - pupils equal and reactive, extraocular eye movements intact, conjunctiva clear  Ears - hearing appears to be intact  Nose - no drainage noted  Mouth - mucous membranes moist  Neck - supple, no carotid bruits, thyroid not palpable  Chest -reduced breath sounds bilateral, clear to auscultation, normal effort  Heart - normal rate, regular rhythm, no murmur  Abdomen - soft, obese, nontender, nondistended, bowel sounds present all four quadrants, no masses, hepatomegaly or splenomegaly  Neurological -lethargic  Extremities - no pedal edema or calf pain with palpation  Skin - no gross lesions, rashes, or induration noted        Data:     Labs:    Hematology:  Recent Labs     23   WBC 18.0* 14.8* 14.3*   RBC 5.33* 4.48 4.57   HGB 14.4 12.1 12.5   HCT 44.9 37.4 38.1   MCV 84.2 83.5 83.4   MCH 27.0 27.0 27.4   MCHC 32.1 32.4 32.8   RDW 14.6* 14.6* 14.6*    170 129*   MPV 9.9 10.3 10.2   INR  --  1.3  --        Chemistry:  Recent Labs     23   * 131* 135   K 4.1 3.8 4.0   CL 94* 98 103   CO2 23 22 21   GLUCOSE 292* 309* 231*   BUN 37* 39* 29*   CREATININE 1.20* 1.09* 0.86   ANIONGAP 13 11 11   LABGLOM 51* 57* >60   CALCIUM 9.3 8.9 8.9       Recent Labs     23  1134 23  1559 23  2110 23  0611 23  1146 23  1603   POCGLU 235* 227* 281* 219* 281* 248*         Lab Results   Component Value Date    INR 1.3 2023    INR 1.7 2023    INR 1.1 2012    PROTIME 16.2 (H) 2023    PROTIME 19.9 (H) 02/05/2023    PROTIME 11.4 08/24/2012       Lab Results   Component Value Date/Time    SPECIAL NOT REPORTED 08/29/2012 06:42 PM     Lab Results   Component Value Date/Time    CULTURE ESCHERICHIA COLI >360428 CFU/ML (A) 02/07/2023 06:24 PM       No results found for: POCPH, PHART, PH, POCPCO2, LGC4ZUH, PCO2, POCPO2, PO2ART, PO2, POCHCO3, OFE1LJM, HCO3, NBEA, PBEA, BEART, BE, THGBART, THB, OLD6TWO, HFFY6ORO, L2HILCIC, O2SAT, FIO2    Radiology:    No results found. All radiological studies reviewed                Code Status:  Full Code    Electronically signed by Cristy Esteves MD on 3/2/2023 at 7:53 PM     Copy sent to Dr. Anjum Guillermo MD    This note was created with the assistance of a speech-recognition program.  Although the intention is to generate a document that actually reflects the content of the visit, no guarantees can be provided that every mistake has been identified and corrected by editing. Note was updated later by me after  physical examination and  completion of the assessment.

## 2023-03-03 NOTE — CARE COORDINATION
Social work: DC held today d/t change is pt status.   Patient has been accepted at [facility].    NEEDS HENS prior to dc.

## 2023-03-03 NOTE — CONSULTS
[facility], [address], [address] [place] [address]  Ph: [phone] or [phone]  FAX: [phone]        Reason for consult: Encephalopathy    I had the pleasure of seeing your patient in neurology consultation for her symptoms. As you would recall Isabelle Peacock is a 60 y.o. yo female admitted on 2/28/2023. The history is obtained from the patient and patient medical record. The patient was recently evaluated by neurology on 2/4/2023 when she presented with encephalopathy and headache. At that time, patient had stated CT scan which showed old periventricular strokes. As per the records, the patient has a past medical history of MRDD but at baseline verbal.  She also history of atrial fibrillation for which she is taking Eliquis. The patient was discharged to rehab, then was admitted on 02/22/2023 due to the hospital with chest pain. The patient was discharged on Eliquis 5 mg twice daily. The patient has a past medical history of bilateral watershed ischemic infarcts due to bilateral severe ICA stenosis in April 2021, being seen in stroke clinic in [address] [place] [address]. Reviewing her records, the patient was last seen in June 2021. Intervention for severe ICA stenosis was not recommended due to severity of her strokes and baseline cognitive deficits. She was recommended to continue on aspirin statin and Eliquis. Past Medical History:   Diagnosis Date    Cerebral artery occlusion with cerebral infarction ([address]     Diabetes mellitus ([address]     HTN (hypertension)     Mental disability     Pulmonary emboli (HCC)     SVT (supraventricular tachycardia) ([address]      No past surgical history on file.   Social History     Socioeconomic History    Marital status: Single     Spouse name: Not on file    Number of children: Not on file    Years of education: Not on file    Highest education level: Not on file   Occupational History    Not on file   Tobacco Use    Smoking status: Never Smokeless tobacco: Never   Substance and Sexual Activity    Alcohol use: Not Currently    Drug use: Not Currently    Sexual activity: Not on file   Other Topics Concern    Not on file   Social History Narrative    Not on file     Social Determinants of Health     Financial Resource Strain: Not on file   Food Insecurity: Not on file   Transportation Needs: Not on file   Physical Activity: Not on file   Stress: Not on file   Social Connections: Not on file   Intimate Partner Violence: Not on file   Housing Stability: Not on file     No family history on file. No Known Allergies   /67   Pulse 82   Temp 98.4 °F (36.9 °C) (Axillary)   Resp 22   Ht 5' 11\" (1.803 m)   Wt 249 lb 14.4 oz (113.4 kg)   SpO2 93%   BMI 34.85 kg/m²      ROS: Unable to obtain due to patient's condition. Neurological examination:    Mental status   Awake. Speech dysarthric. Cranial nerves   Face is symmetric. Extraocular movements appear intact. Pupils are round and reactive     Motor function  Spontaneous movement in both upper and lower extremities   Sensory function Unable to assess   Cerebellar No visible tremors   Reflex function Intact 2+ DTR and symmetric.  Negative Babinski     Gait                  Not tested       Lab Results   Component Value Date    LDLCHOLESTEROL 69 05/07/2014      No components found for: CHLPL   Lab Results   Component Value Date    TRIG 137 05/07/2014    TRIG 114 12/06/2012      Lab Results   Component Value Date    HDL 39 (L) 05/07/2014    HDL 44 12/06/2012      No results found for: LDLCALC   No results found for: LABVLDL   Lab Results   Component Value Date    LABA1C 7.1 (H) 02/20/2023      Lab Results   Component Value Date     02/20/2023      No results found for: [id number]     Neurological work up:  Study Finding   CT head    CTA Head and neck Markedly abnormal.  Differential includes vasculitis/chronic dissection/FMD.     MRI brain  4/3/2021 Numerous foci in diffuse regions of periventricular white matter signal abnormalities with involvement of splenium of corpus callosum. Enhancement of subcortical region and periventricular white matter. Etiology unclear. Possible lacunar subacute strokes versus demyelinating disease financing lesions versus cerebritis. 2 D Echo    RAJESH    5 days video EEG monitoring  4/11/2021 Generalized slowing consistent with encephalopathy. Assessment and Recommendations:     Punctate bilateral subcortical ischemic strokes:  MRI scan is completed. Reports are pending. There is evidence of subacute punctate bilateral punctate ischemic strokes in head of caudate and right thalamic region. I have reviewed patient's previous neurological records from April 2021. At that time the patient presented with multiple bilateral acute ischemic changes on MRI scan of the brain and was found to have bilateral carotid extracranial and intracranial atherosclerosis. Subsequently underwent cerebral angiogram and findings for suggestive of fibromuscular dysplasia. No intervention was done and patient was continued on aspirin, Eliquis and statins. MRI scan of the brain in April 2021 showed multiple areas of diffuse resection, involving corpus callosum and there was a question if these findings were demyelinating, vasculitic or were from ischemia. I have requested the films to be transferred to PACS for review. I will check patient's SUSU, ANCA, ESR, CRP as part of vasculitis panel. I will obtain repeat CT angiogram head and neck to rule out vasculitis. We will discuss the case with neuro endovascular based on CT angiogram results and consider getting a spinal tap on Monday to rule out inflammatory theology/vasculitis. Will follow. Thank you for the consult.          Jimmy Eastman MD  Neurology    This note is created with the assistance of a speech-recognition program. While intending to generate a document that actually reflects the content of the visit, the document can still have some errors including those of syntax and sound a- like substitutions which may escape proofreading. In such instances, actual meaning can be extrapolated by contextual derivation.

## 2023-03-03 NOTE — PROGRESS NOTES
Patient scheduled for 1200 discharge to [facility]. Call placed to Nithin Noriega, to notify of discharge and to review testing. Ann-Marie Santacruz informed of need to follow up with neurology as an outpatient after the patient has been discharged. Dr. Wilman Barnes, neurologist, will still see patient prior to discharge, but patient will need to follow up. Information added to discharge instructions.

## 2023-03-03 NOTE — DISCHARGE INSTR - COC
Continuity of Care Form    Patient Name: Janey Alex   :  1960  MRN:  [id number]    Admit date:  2023  Discharge date:  3/9/23    Code Status Order: Full Code   Advance Directives:     Admitting Physician:  Quentin Bustos MD  PCP: Radha Naqvi MD    Discharging Nurse: Anusha MACEDO  Discharging Hospital Unit/Room#: 2106/2106-01  Discharging Unit Phone Number: [phone]    Emergency Contact:   Extended Emergency Contact Information  Primary Emergency Contact: Swati Acosta  Home Phone: [phone]  Mobile Phone: [phone]  Relation: Brother/Sister  Secondary Emergency Contact: Crista Laureano  Mobile Phone: [phone]  Relation: None    Past Surgical History:  No past surgical history on file.    Immunization History:   Immunization History   Administered Date(s) Administered    COVID-19, PFIZER PURPLE top, DILUTE for use, (age 12 y+), 30mcg/0.3mL 2021, 2021, 2022    Influenza A ([id number]) Vaccine PF IM 11/10/2009    Influenza Virus Vaccine 2013, 2013, 2014, 2014    Influenza, AFLURIA (age 3 yrs+), FLUZONE, (age 6 mo+), MDV, 0.5mL 2019, 2020, 2021, 10/07/2022    Influenza, Triv, 3 Years and older, IM, PF (Afluria 5yrs and older) 2011    Pneumococcal Polysaccharide ([id number]) 2019       Active Problems:  Patient Active Problem List   Diagnosis Code    New onset of congestive heart failure (HCC) I50.9    Paroxysmal supraventricular tachycardia (HCC) I47.1    Acute intractable tension-type headache G44.201    Post-concussion headache G44.309    Urinary tract infection without hematuria N39.0    Hypokalemia E87.6    Weakness R53.1       Isolation/Infection:   Isolation            No Isolation          Patient Infection Status       Infection Onset Added Last Indicated Last Indicated By Review Planned Expiration Resolved Resolved By    None active    Resolved    COVID-19 (Rule Out) 23 COVID-19, Rapid  (Ordered)   02/19/23 Rule-Out Test Resulted            Nurse Assessment:  Last Vital Signs: /67   Pulse 82   Temp 98.4 °F (36.9 °C) (Axillary)   Resp 22   Ht 5' 11\" (1.803 m)   Wt 249 lb 14.4 oz (113.4 kg)   SpO2 93%   BMI 34.85 kg/m²     Last documented pain score (0-10 scale): Pain Level: 10  Last Weight:   Wt Readings from Last 1 Encounters:   03/03/23 249 lb 14.4 oz (113.4 kg)     Mental Status:  disoriented, oriented, alert, and disoriented to situation    IV Access:  - None    Nursing Mobility/ADLs:  Walking   Dependent  Transfer  Dependent  Bathing  Dependent  Dressing  Dependent  Toileting  Dependent  Feeding  Dependent  Med Admin  Assisted  Med Delivery   whole and prefers mixed with pudding    Wound Care Documentation and Therapy:        Elimination:  Continence: Bowel: Yes  Bladder: No  Urinary Catheter: None   Colostomy/Ileostomy/Ileal Conduit: No       Date of Last BM: 3/9/23    Intake/Output Summary (Last 24 hours) at 3/3/2023 1045  Last data filed at 3/3/2023 0612  Gross per 24 hour   Intake --   Output 1100 ml   Net -1100 ml     I/O last 3 completed shifts:  In: -   Out: 2250 [Urine:2250]    Safety Concerns: At Risk for Falls    Impairments/Disabilities:      None    Nutrition Therapy:  Current Nutrition Therapy:   - Oral Diet:  General    Routes of Feeding: Oral  Liquids: Thin Liquids  Daily Fluid Restriction: no  Last Modified Barium Swallow with Video (Video Swallowing Test): not done    Treatments at the Time of Hospital Discharge:   Respiratory Treatments: ***  Oxygen Therapy:  is not on home oxygen therapy.   Ventilator:    - No ventilator support    Rehab Therapies: Physical Therapy and Occupational Therapy  Weight Bearing Status/Restrictions: No weight bearing restrictions  Other Medical Equipment (for information only, NOT a DME order):  wheelchair  Other Treatments: ***    Patient's personal belongings (please select all that are sent with patient):  None    RN SIGNATURE: Electronically signed by Maryan Kruse RN on 3/9/23 at 3:01 PM EST    CASE MANAGEMENT/SOCIAL WORK SECTION    Inpatient Status Date: ***    Readmission Risk Assessment Score:  Readmission Risk              Risk of Unplanned Readmission:  29           Discharging to Facility/ Agency   Name: [facility] [address] [place] [id number]  Phone: [phone]  Fax: [phone]    Dialysis Facility (if applicable)   Name:  Address:  Dialysis Schedule:  Phone:  Fax:    / signature: Electronically signed by ESTEE Perez, DAVIDW on 3/4/23 at 12:13 PM EST    PHYSICIAN SECTION    Prognosis: Fair    Condition at Discharge: Stable    Rehab Potential (if transferring to Rehab): Fair    Recommended Labs or Other Treatments After Discharge: Diagnosis:    Skilled Nursing per hospital recommendation ( Monitor Vitals,pain, Mental status, daily weight Blood sugar monitoring TIDAC.) Call MD if Blood sugar<60 or > 350,Fall precaution, wound care epstein catheter removal as per Nursing home attending)  Skilled OT  Physical Therapy  Daily Labs: cbc,bmp q weekly  Monitor INR Daily if pt on coumadin   Coumadin management per SNF admitting physician unless otherwise specified. Oxygen 2L/minute   Blood sugar accucheck TIDAC  Daily Pulse oxymetry  Continue CPAP/BIPAP if pt wearing at home. Catheter/drain care per surgery  If pt on Tube feeding- please continue per hospital orders. Physician Certification: I certify the above information and transfer of Cecy Mart  is necessary for the continuing treatment of the diagnosis listed and that she requires [place] for less 30 days.      Update Admission H&P: No change in H&P    PHYSICIAN SIGNATURE:  Electronically signed by Sole Rahman MD on 3/8/23 at 2:49 PM EST

## 2023-03-04 ENCOUNTER — APPOINTMENT (OUTPATIENT)
Dept: CT IMAGING | Age: 63
DRG: 064 | End: 2023-03-04
Payer: MEDICARE

## 2023-03-04 ENCOUNTER — APPOINTMENT (OUTPATIENT)
Dept: GENERAL RADIOLOGY | Age: 63
DRG: 064 | End: 2023-03-04
Payer: MEDICARE

## 2023-03-04 LAB
ANION GAP SERPL CALCULATED.3IONS-SCNC: 11 MMOL/L (ref 9–17)
BILIRUBIN URINE: NEGATIVE
BUN SERPL-MCNC: 34 MG/DL (ref 8–23)
BUN/CREAT BLD: 33 (ref 9–20)
CALCIUM SERPL-MCNC: 8.5 MG/DL (ref 8.6–10.4)
CHLORIDE SERPL-SCNC: 102 MMOL/L (ref 98–107)
CO2 SERPL-SCNC: 22 MMOL/L (ref 20–31)
COLOR: YELLOW
CREAT SERPL-MCNC: 1.04 MG/DL (ref 0.5–0.9)
EPITHELIAL CELLS UA: ABNORMAL /HPF (ref 0–5)
FIO2: 3
GFR SERPL CREATININE-BSD FRML MDRD: >60 ML/MIN/1.73M2
GLUCOSE BLD-MCNC: 125 MG/DL (ref 65–105)
GLUCOSE BLD-MCNC: 209 MG/DL (ref 65–105)
GLUCOSE BLD-MCNC: 217 MG/DL (ref 65–105)
GLUCOSE BLD-MCNC: 230 MG/DL (ref 65–105)
GLUCOSE BLD-MCNC: 238 MG/DL (ref 65–105)
GLUCOSE SERPL-MCNC: 125 MG/DL (ref 70–99)
GLUCOSE UR STRIP.AUTO-MCNC: NEGATIVE MG/DL
HCO3 VENOUS: 17 MMOL/L (ref 22–29)
KETONES UR STRIP.AUTO-MCNC: NEGATIVE MG/DL
LEUKOCYTE ESTERASE UR QL STRIP.AUTO: ABNORMAL
MYOGLOBIN SERPL-MCNC: 38 NG/ML (ref 25–58)
NEGATIVE BASE EXCESS, VEN: 5 (ref 0–2)
NITRITE UR QL STRIP.AUTO: NEGATIVE
O2 DEVICE/FLOW/%: ABNORMAL
O2 SAT, VEN: 95 % (ref 60–85)
PARTIAL THROMBOPLASTIN TIME: 36.5 SEC (ref 23.9–33.8)
PCO2, VEN: 23.5 MM HG (ref 41–51)
PH VENOUS: 7.47 (ref 7.32–7.43)
PO2, VEN: 68.3 MM HG (ref 30–50)
POTASSIUM SERPL-SCNC: 4.4 MMOL/L (ref 3.7–5.3)
PROT UR STRIP.AUTO-MCNC: 5.5 MG/DL (ref 5–8)
PROT UR STRIP.AUTO-MCNC: ABNORMAL MG/DL
RBC CLUMPS #/AREA URNS AUTO: ABNORMAL /HPF (ref 0–2)
SODIUM SERPL-SCNC: 135 MMOL/L (ref 135–144)
SPECIFIC GRAVITY UA: 1.02 (ref 1–1.03)
TROPONIN I SERPL DL<=0.01 NG/ML-MCNC: 155 NG/L (ref 0–14)
TURBIDITY: ABNORMAL
URINE HGB: ABNORMAL
UROBILINOGEN, URINE: NORMAL
WBC UA: ABNORMAL /HPF (ref 0–5)
YEAST: ABNORMAL

## 2023-03-04 PROCEDURE — 93005 ELECTROCARDIOGRAM TRACING: CPT | Performed by: NURSE PRACTITIONER

## 2023-03-04 PROCEDURE — 2580000003 HC RX 258: Performed by: FAMILY MEDICINE

## 2023-03-04 PROCEDURE — 2580000003 HC RX 258: Performed by: PSYCHIATRY & NEUROLOGY

## 2023-03-04 PROCEDURE — 82947 ASSAY GLUCOSE BLOOD QUANT: CPT

## 2023-03-04 PROCEDURE — 6370000000 HC RX 637 (ALT 250 FOR IP): Performed by: FAMILY MEDICINE

## 2023-03-04 PROCEDURE — 6360000004 HC RX CONTRAST MEDICATION: Performed by: PSYCHIATRY & NEUROLOGY

## 2023-03-04 PROCEDURE — 70498 CT ANGIOGRAPHY NECK: CPT

## 2023-03-04 PROCEDURE — 85730 THROMBOPLASTIN TIME PARTIAL: CPT

## 2023-03-04 PROCEDURE — 36415 COLL VENOUS BLD VENIPUNCTURE: CPT

## 2023-03-04 PROCEDURE — 85025 COMPLETE CBC W/AUTO DIFF WBC: CPT

## 2023-03-04 PROCEDURE — 94660 CPAP INITIATION&MGMT: CPT

## 2023-03-04 PROCEDURE — 2580000003 HC RX 258: Performed by: NURSE PRACTITIONER

## 2023-03-04 PROCEDURE — 84484 ASSAY OF TROPONIN QUANT: CPT

## 2023-03-04 PROCEDURE — 2500000003 HC RX 250 WO HCPCS: Performed by: NURSE PRACTITIONER

## 2023-03-04 PROCEDURE — 2700000000 HC OXYGEN THERAPY PER DAY

## 2023-03-04 PROCEDURE — 2060000000 HC ICU INTERMEDIATE R&B

## 2023-03-04 PROCEDURE — 81001 URINALYSIS AUTO W/SCOPE: CPT

## 2023-03-04 PROCEDURE — 97112 NEUROMUSCULAR REEDUCATION: CPT

## 2023-03-04 PROCEDURE — 99233 SBSQ HOSP IP/OBS HIGH 50: CPT | Performed by: PSYCHIATRY & NEUROLOGY

## 2023-03-04 PROCEDURE — 94761 N-INVAS EAR/PLS OXIMETRY MLT: CPT

## 2023-03-04 PROCEDURE — 80048 BASIC METABOLIC PNL TOTAL CA: CPT

## 2023-03-04 PROCEDURE — 82803 BLOOD GASES ANY COMBINATION: CPT

## 2023-03-04 PROCEDURE — 97530 THERAPEUTIC ACTIVITIES: CPT

## 2023-03-04 PROCEDURE — 83874 ASSAY OF MYOGLOBIN: CPT

## 2023-03-04 PROCEDURE — 71045 X-RAY EXAM CHEST 1 VIEW: CPT

## 2023-03-04 PROCEDURE — 6360000002 HC RX W HCPCS: Performed by: FAMILY MEDICINE

## 2023-03-04 RX ORDER — METOPROLOL TARTRATE 5 MG/5ML
2.5 INJECTION INTRAVENOUS ONCE
Status: COMPLETED | OUTPATIENT
Start: 2023-03-04 | End: 2023-03-04

## 2023-03-04 RX ORDER — DILTIAZEM HYDROCHLORIDE 5 MG/ML
2.5 INJECTION INTRAVENOUS ONCE
Status: DISCONTINUED | OUTPATIENT
Start: 2023-03-04 | End: 2023-03-04 | Stop reason: RX

## 2023-03-04 RX ORDER — 0.9 % SODIUM CHLORIDE 0.9 %
1000 INTRAVENOUS SOLUTION INTRAVENOUS ONCE
Status: COMPLETED | OUTPATIENT
Start: 2023-03-04 | End: 2023-03-04

## 2023-03-04 RX ORDER — HEPARIN SODIUM 1000 [USP'U]/ML
40 INJECTION, SOLUTION INTRAVENOUS; SUBCUTANEOUS PRN
Status: DISCONTINUED | OUTPATIENT
Start: 2023-03-04 | End: 2023-03-08

## 2023-03-04 RX ORDER — HEPARIN SODIUM 1000 [USP'U]/ML
80 INJECTION, SOLUTION INTRAVENOUS; SUBCUTANEOUS PRN
Status: DISCONTINUED | OUTPATIENT
Start: 2023-03-04 | End: 2023-03-08

## 2023-03-04 RX ORDER — HEPARIN SODIUM 10000 [USP'U]/100ML
5-30 INJECTION, SOLUTION INTRAVENOUS CONTINUOUS
Status: DISPENSED | OUTPATIENT
Start: 2023-03-04 | End: 2023-03-06

## 2023-03-04 RX ORDER — METOPROLOL TARTRATE 5 MG/5ML
5 INJECTION INTRAVENOUS ONCE
Status: COMPLETED | OUTPATIENT
Start: 2023-03-04 | End: 2023-03-04

## 2023-03-04 RX ORDER — 0.9 % SODIUM CHLORIDE 0.9 %
80 INTRAVENOUS SOLUTION INTRAVENOUS ONCE
Status: DISCONTINUED | OUTPATIENT
Start: 2023-03-04 | End: 2023-03-09 | Stop reason: HOSPADM

## 2023-03-04 RX ORDER — SODIUM CHLORIDE 0.9 % (FLUSH) 0.9 %
10 SYRINGE (ML) INJECTION ONCE
Status: COMPLETED | OUTPATIENT
Start: 2023-03-04 | End: 2023-03-04

## 2023-03-04 RX ADMIN — METOPROLOL TARTRATE 5 MG: 5 INJECTION, SOLUTION INTRAVENOUS at 23:29

## 2023-03-04 RX ADMIN — ACETAMINOPHEN 650 MG: 325 TABLET ORAL at 22:38

## 2023-03-04 RX ADMIN — ATORVASTATIN CALCIUM 20 MG: 20 TABLET, FILM COATED ORAL at 22:38

## 2023-03-04 RX ADMIN — SODIUM CHLORIDE, PRESERVATIVE FREE 10 ML: 5 INJECTION INTRAVENOUS at 21:56

## 2023-03-04 RX ADMIN — GABAPENTIN 100 MG: 100 CAPSULE ORAL at 22:38

## 2023-03-04 RX ADMIN — Medication 100 ML: at 15:20

## 2023-03-04 RX ADMIN — SPIRONOLACTONE 25 MG: 25 TABLET ORAL at 09:54

## 2023-03-04 RX ADMIN — SODIUM CHLORIDE 1000 ML: 9 INJECTION, SOLUTION INTRAVENOUS at 22:00

## 2023-03-04 RX ADMIN — METFORMIN HYDROCHLORIDE 500 MG: 500 TABLET, FILM COATED ORAL at 09:54

## 2023-03-04 RX ADMIN — APIXABAN 5 MG: 5 TABLET, FILM COATED ORAL at 09:54

## 2023-03-04 RX ADMIN — HEPARIN SODIUM 18 UNITS/KG/HR: 10000 INJECTION, SOLUTION INTRAVENOUS at 22:13

## 2023-03-04 RX ADMIN — IOPAMIDOL 75 ML: 755 INJECTION, SOLUTION INTRAVENOUS at 15:20

## 2023-03-04 RX ADMIN — METOPROLOL SUCCINATE 50 MG: 50 TABLET, EXTENDED RELEASE ORAL at 09:54

## 2023-03-04 RX ADMIN — SODIUM CHLORIDE, PRESERVATIVE FREE 10 ML: 5 INJECTION INTRAVENOUS at 09:55

## 2023-03-04 RX ADMIN — SERTRALINE 25 MG: 25 TABLET, FILM COATED ORAL at 09:55

## 2023-03-04 RX ADMIN — METOPROLOL TARTRATE 2.5 MG: 1 INJECTION, SOLUTION INTRAVENOUS at 21:56

## 2023-03-04 RX ADMIN — INSULIN LISPRO 2 UNITS: 100 INJECTION, SOLUTION INTRAVENOUS; SUBCUTANEOUS at 18:09

## 2023-03-04 RX ADMIN — GLIPIZIDE 2.5 MG: 5 TABLET ORAL at 09:54

## 2023-03-04 RX ADMIN — SODIUM CHLORIDE, PRESERVATIVE FREE 10 ML: 5 INJECTION INTRAVENOUS at 15:20

## 2023-03-04 RX ADMIN — SODIUM CHLORIDE: 9 INJECTION, SOLUTION INTRAVENOUS at 18:13

## 2023-03-04 RX ADMIN — ACETAMINOPHEN 650 MG: 325 TABLET ORAL at 02:00

## 2023-03-04 RX ADMIN — DILTIAZEM HYDROCHLORIDE 120 MG: 60 CAPSULE, EXTENDED RELEASE ORAL at 23:04

## 2023-03-04 RX ADMIN — ANTI-FUNGAL POWDER MICONAZOLE NITRATE TALC FREE: 1.42 POWDER TOPICAL at 00:44

## 2023-03-04 RX ADMIN — CEFTRIAXONE SODIUM 1000 MG: 1 INJECTION, POWDER, FOR SOLUTION INTRAMUSCULAR; INTRAVENOUS at 18:14

## 2023-03-04 RX ADMIN — ANTI-FUNGAL POWDER MICONAZOLE NITRATE TALC FREE: 1.42 POWDER TOPICAL at 17:51

## 2023-03-04 RX ADMIN — FENOFIBRATE 54 MG: 54 TABLET ORAL at 09:55

## 2023-03-04 RX ADMIN — ANTI-FUNGAL POWDER MICONAZOLE NITRATE TALC FREE: 1.42 POWDER TOPICAL at 09:00

## 2023-03-04 RX ADMIN — FUROSEMIDE 40 MG: 40 TABLET ORAL at 09:54

## 2023-03-04 RX ADMIN — ACETAMINOPHEN 650 MG: 325 TABLET ORAL at 09:54

## 2023-03-04 RX ADMIN — DILTIAZEM HYDROCHLORIDE 120 MG: 60 CAPSULE, EXTENDED RELEASE ORAL at 09:55

## 2023-03-04 RX ADMIN — GABAPENTIN 100 MG: 100 CAPSULE ORAL at 09:54

## 2023-03-04 RX ADMIN — ASPIRIN 325 MG: 325 TABLET ORAL at 09:54

## 2023-03-04 RX ADMIN — ANTI-FUNGAL POWDER MICONAZOLE NITRATE TALC FREE: 1.42 POWDER TOPICAL at 12:18

## 2023-03-04 ASSESSMENT — PAIN DESCRIPTION - DESCRIPTORS
DESCRIPTORS: ACHING
DESCRIPTORS: ACHING

## 2023-03-04 ASSESSMENT — PAIN SCALES - GENERAL
PAINLEVEL_OUTOF10: 9
PAINLEVEL_OUTOF10: 9
PAINLEVEL_OUTOF10: 0

## 2023-03-04 ASSESSMENT — PAIN DESCRIPTION - LOCATION
LOCATION: HEAD
LOCATION: HEAD

## 2023-03-04 ASSESSMENT — PAIN SCALES - WONG BAKER: WONGBAKER_NUMERICALRESPONSE: 8

## 2023-03-04 ASSESSMENT — PAIN DESCRIPTION - ORIENTATION
ORIENTATION: MID
ORIENTATION: MID

## 2023-03-04 NOTE — PROGRESS NOTES
Physical Therapy  Facility/Department: [facility]  Daily Treatment Note  NAME: Diamond Pemberton  : 1960  MRN: [id number]    Date of Service: 3/4/2023    Discharge Recommendations:  Patient would benefit from continued therapy after discharge        Patient Diagnosis(es): The encounter diagnosis was Generalized weakness. Assessment   Assessment: Pt required assist for participation, level of participation varies with fatigue and willingness to participate, pt relative easily agitated but can be calmed and redirected. Activity Tolerance: Treatment limited secondary to decreased cognition     Plan    Physcial Therapy Plan  General Plan: 5-7 times per week  Current Treatment Recommendations: Strengthening;ROM;Balance training;Functional mobility training;Transfer training; Endurance training;Home exercise program;Safety education & training;Patient/Caregiver education & training; Therapeutic activities     Restrictions  Restrictions/Precautions  Restrictions/Precautions: Fall Risk, Up as Tolerated, Bed Alarm, General Precautions  Position Activity Restriction  Other position/activity restrictions: MRDD     Subjective    Subjective  Subjective: Pt participates with encouragement and extra time. Cleared for PT by RN.   Orientation  Overall Orientation Status: Impaired  Orientation Level: Oriented to person;Disoriented to time;Disoriented to situation;Disoriented to place  Cognition  Overall Cognitive Status: Exceptions  Arousal/Alertness: Appropriate responses to stimuli  Following Commands: Inconsistently follows commands  Attention Span: Difficulty attending to directions  Memory: Decreased recall of biographical Information;Decreased recall of precautions;Decreased recall of recent events  Safety Judgement: Decreased awareness of need for safety;Decreased awareness of need for assistance  Problem Solving: Assistance required to generate solutions;Assistance required to implement solutions;Decreased awareness of errors;Assistance required to correct errors made  Insights: Decreased awareness of deficits  Initiation: Requires cues for all  Sequencing: Requires cues for all  Cognition Comment: MRDD; speech difficult to understand, pt easily agitated at times, can be redirected relatively easily. Objective   Bed Mobility Training  Bed Mobility Training: Yes  Overall Level of Assistance: Maximum assistance (x 2)  Interventions: Tactile cues; Verbal cues; Visual cues  Rolling: Maximum assistance (x 2)  Supine to Sit: Maximum assistance (x 2)  Sit to Supine: Maximum assistance (x 2)  Scooting: Maximum assistance (x 2)    Balance  Sitting: With support  Standing:  (unable despite max x 2 with Gurmeet Doom)  Transfer Training  Transfer Training: Yes (Attempted sit to stand pt unable clear hips despite max x 2 assist with Gurmeet Doom.)  Neuromuscular Education  Neuromuscular Comments: Pt able to sit EOB x approx 7 min with varied assist from max to CGA, pt able to feed self applesauce while seated at EOB, pt requires cues and encouragment for continued particpation and max participation. Safety Devices  Type of Devices: Gait belt;Patient at risk for falls; Bed alarm in place;Call light within reach;Nurse notified; All fall risk precautions in place       Goals  Short Term Goals  Time Frame for Short Term Goals: 12 visits:  Short Term Goal 1: Pt. to requrie mod A for bed mob. Short Term Goal 2: Pt to requrie mod A x2 for sit to stand transfer with RW  Short Term Goal 3: Pt. to takes steps along EOB with RW, mod A x 2  Short Term Goal 4: Pt. to have good dynamic sitting balance  Short Term Goal 5: Pt. to tolerate 25+ min.  of PT daily for ther ex/ balance training/ functional mob. training  Patient Goals   Patient Goals : Pt. states she knows she is going to a \"nursing home\"    Education  Patient Education  Education Given To: Patient  Education Provided: Transfer Training  Education Provided Comments: Cues for sitting balance, midline/neutral, cues for sequencing/technique for bed mob and sit to stand transfer.   Education Method: Verbal  Barriers to Learning: Cognition  Education Outcome: Continued education needed    Therapy Time   Individual Concurrent Group Co-treatment   Time In 3297         Time Out 259 Alberto Kelly, PTA

## 2023-03-04 NOTE — PROGRESS NOTES
[facility]) Neurology Specialist  [address]  [place], [address] [facility] 30:  [phone] or [phone]  FAX:  [phone]            Brief history: Oc Whaley is a 60 y.o. old female admitted on 2/28/2023 with encephalopathy     Subjective: No new neurological events overnight. The patient is somewhat more awake today. As per family at the bedside, the patient is still not back to baseline. Objective: BP (!) 97/56   Pulse 91   Temp 98.2 °F (36.8 °C) (Axillary)   Resp 20   Ht 5' 11\" (1.803 m)   Wt 254 lb (115.2 kg)   SpO2 96%   BMI 35.43 kg/m²       Medications:    sodium chloride  80 mL IntraVENous Once    [Held by provider] aspirin  325 mg Oral Daily    miconazole   Topical 4x Daily    gabapentin  100 mg Oral TID    insulin lispro  0-8 Units SubCUTAneous TID WC    insulin lispro  0-4 Units SubCUTAneous Nightly    [Held by provider] apixaban  5 mg Oral BID    atorvastatin  20 mg Oral Nightly    dilTIAZem  120 mg Oral BID    fenofibrate  54 mg Oral Daily    furosemide  40 mg Oral Daily    glipiZIDE  2.5 mg Oral QAM AC    metFORMIN  500 mg Oral BID WC    metoprolol succinate  50 mg Oral Daily    QUEtiapine  25 mg Oral QPM    sertraline  25 mg Oral Daily    spironolactone  25 mg Oral Daily    sodium chloride flush  5-40 mL IntraVENous 2 times per day        Neurological examination:    Mental status   Awake. Speech dysarthric. Cranial nerves   Face is symmetric. Extraocular movements appear intact. Pupils are round and reactive     Motor function  Spontaneous movement in both upper and lower extremities   Sensory function Unable to assess   Cerebellar No visible tremors   Reflex function Intact 2+ DTR and symmetric.  Negative Babinski     Gait                  Not tested       Lab Results   Component Value Date    LDLCHOLESTEROL 69 05/07/2014      No components found for: CHLPL   Lab Results   Component Value Date    TRIG 137 05/07/2014    TRIG 114 12/06/2012      Lab Results Component Value Date    HDL 39 (L) 05/07/2014    HDL 44 12/06/2012      No results found for: LDLCALC   No results found for: LABVLDL   Lab Results   Component Value Date    LABA1C 7.7 (H) 03/03/2023      Lab Results   Component Value Date     03/03/2023      No results found for: Yao Pinto     Neurological work up:  Study Finding   CT head    CTA Head and neck Markedly abnormal.  Differential includes vasculitis/chronic dissection/FMD.     MRI brain  4/3/2021 Numerous foci in diffuse regions of periventricular white matter signal abnormalities with involvement of splenium of corpus callosum. Enhancement of subcortical region and periventricular white matter. Etiology unclear. Possible lacunar subacute strokes versus demyelinating disease financing lesions versus cerebritis. 2 D Echo    RAJESH    5 days video EEG monitoring  4/11/2021 Generalized slowing consistent with encephalopathy. Assessment and Recommendations:     Punctate bilateral subcortical ischemic strokes:  MRI of the brain is consistent with multiple punctate bilateral ischemic strokes. Etiology could be secondary to fibromuscular dysplasia/CNS vasculitis    I would stop aspirin and Eliquis in anticipation for a spinal tap on Monday to rule out any inflammatory otology for patient's symptoms    CTA head and neck showed diffusely irregular and stenotic bilateral cervical and intracranial ICAs. Questionable pulm vascular dysplasia versus chronic dissection/thrombosis. Unchanged from 4/9/2021    I will leave it to the primary team's discretion if patient needs to be on heparin drip in the meantime. Previously there is a question of patient having pulmonary embolism. As per family, the patient has a past medical history of MRDD at birth but at baseline is very communicative, independent and has intact speech. Vasculitis panel is pending     We will follow.       Abel Hagan MD  Neurology    This note is created with the assistance of a speech-recognition program. While intending to generate a document that actually reflects the content of the visit, the document can still have some errors including those of syntax and sound a- like substitutions which may escape proofreading. In such instances, actual meaning can be extrapolated by contextual derivation.

## 2023-03-04 NOTE — CARE COORDINATION
Social worK; accepted by [employer] at discharge. .Will need mana, Rx and discharge order for snf. Report: [phone]  Fax: [phone]  Will need to set up on a weekend day before 1:30 pm or wait until the next day to reach liaisons. This weekend it is Ellston Sees [phone]. Left her a message not ready today. Hens started. Xavier krishnan

## 2023-03-04 NOTE — PLAN OF CARE
Problem: Discharge Planning  Goal: Discharge to home or other facility with appropriate resources  3/4/2023 1538 by Lakesha De Luna RN  Note: Progressing     Problem: Pain  Goal: Verbalizes/displays adequate comfort level or baseline comfort level  3/4/2023 1538 by Lakesha De Luna RN  Outcome: Progressing  Note: Progressing     Problem: Skin/Tissue Integrity  Goal: Absence of new skin breakdown  Description: 1. Monitor for areas of redness and/or skin breakdown  2. Assess vascular access sites hourly  3. Every 4-6 hours minimum:  Change oxygen saturation probe site  4. Every 4-6 hours:  If on nasal continuous positive airway pressure, respiratory therapy assess nares and determine need for appliance change or resting period.   3/4/2023 1538 by Lakesha De Luna RN  Outcome: Progressing  Note: Progressing     Problem: Safety - Adult  Goal: Free from fall injury  3/4/2023 1538 by Lakesha De Luna RN  Outcome: Progressing  Note: Progressing     Problem: Chronic Conditions and Co-morbidities  Goal: Patient's chronic conditions and co-morbidity symptoms are monitored and maintained or improved  Outcome: Progressing  Note: Progressing       Problem: Neurosensory - Adult  Goal: Achieves stable or improved neurological status  Outcome: Progressing  Flowsheets (Taken 3/4/2023 1538)  Achieves stable or improved neurological status: Assess for and report changes in neurological status  Note: Progressing     Problem: Musculoskeletal - Adult  Goal: Return mobility to safest level of function  Outcome: Progressing  Flowsheets (Taken 3/4/2023 1538)  Return Mobility to Safest Level of Function: Assess patient stability and activity tolerance for standing, transferring and ambulating with or without assistive devices  Note: Progressing

## 2023-03-04 NOTE — PROGRESS NOTES
[facility]    Adult Hospitalist      Name: Jorge A Burns  MRN: [id number]     Acct: [de-identified]  Room: 2019/2019-02    Admit Date: 2/28/2023  5:51 PM  PCP: Dom Ewing MD    Primary Problem  Principal Problem:    Weakness  Resolved Problems:    * No resolved hospital problems. *        Assesment:     Unable to ambulate  Weakness  Atrial fibrillation with controlled rate  Chronic diastolic congestive heart failure  Mild mitral stenosis  Moderate left ventricular hypertrophy on echo 2/6/2023  Cognitive impairment  Essential hypertension  Diabetes mellitus type 2  Hyperlipidemia  Urgent continence  Morbid obesity with BMI 38.3  Fever  Headache  Acute microinfarcts caudate head and right thalamus         Plan:     Admit to [facility]  Monitor vitals closely  Keep SPO2 above 90%  I's and O's  IV  Pain control  Antiemetics as needed  Reviewed chart and paperwork  Discussed with medical staff  PT/OT  Consult social work  Patient requires physical rehabilitation. Apparently her POA refused SNF during her recent admission to the [address] [place] [address] Dr before meals and at bedtime  Insulin sliding scale, medium  Hypoglycemia protocol  Resume essential home medication  Add parameters  CBC, BMP  Gabapentin  X-ray lumbar  Consult neurology  Plan discharge to skilled nursing facility tomorrow-hold  Aspirin  Neurology plan lumbar puncture to rule out vasculitis on Monday  Hold aspirin and Eliquis on 3/4/2023  Plan LP on 3/6  Bridge w Heparin infusion for 6 hours before procedure   D/w POA in detail  Rocephin IV   DVT and GI prophylaxis. Chief Complaint:     Chief Complaint   Patient presents with    Fatigue     Unable to walk or stand.  Discharged from [facility] today         History of Present Illness:        Patient seen and examined at bedside  Last 24-hour events reviewed with nursing  Patient complained of acute right-sided headache earlier which is better  UA sent for c/s  Start Abx  Tylenol as needed MRI brain showed micro emboli  Patient denies chest pain, dyspnea   Denies vomiting or diarrhea  Denies abdominal pain    Pt is not at baseline w mentation per POA      Initial HPI  Jodie Parish is a 60 y.o.  female who presents with Fatigue (Unable to walk or stand. Discharged from [facility] today)    Admitted to the emergency room presented with family with complaints of generalized weakness. Patient speaks routinely in 1-2 word sentences and has not been able to provide any significant history herself. Most of the information has been obtained from her family members and POA through the emergency room personnel and nursing staff. Has not been able to ambulate with assistance of PT either. She requires at least 2 assist to transfer from bed to chair. Patient is not in any apparent distress. She has not complained of any chest pain, headache, vision change, abdominal pain, dysuria, diarrhea or joint swelling    I have personally reviewed the past medical history, past surgical history, medications, social history, and family history, and summarized in the note. Review of Systems:     All 10 point system is reviewed and negative otherwise mentioned in HPI. Past Medical History:     Past Medical History:   Diagnosis Date    Cerebral artery occlusion with cerebral infarction ([facility]     Diabetes mellitus ([facility]     HTN (hypertension)     Mental disability     Pulmonary emboli (HCC)     SVT (supraventricular tachycardia) ([facility]         Past Surgical History:     No past surgical history on file. Medications Prior to Admission:       Prior to Admission medications    Medication Sig Start Date End Date Taking?  Authorizing Provider   dilTIAZem (CARDIZEM 12 HR) 120 MG extended release capsule Take 120 mg by mouth 2 times daily   Yes Historical Provider, MD   potassium chloride (KLOR-CON M) 20 MEQ extended release tablet Take 20 mEq by mouth daily   Yes Historical Provider, MD   Icosapent Ethyl (VASCEPA) 1 g CAPS capsule Take 2 capsules by mouth 2 times daily   Yes Historical Provider, MD   furosemide (LASIX) 40 MG tablet Take 1 tablet by mouth daily 2/10/23   Emanuel Tinsley MD   methenamine (HIPREX) 1 g tablet Take 1 g by mouth 2 times daily (with meals)    Historical Provider, MD   apixaban (ELIQUIS) 5 MG TABS tablet Take 5 mg by mouth 2 times daily 1/29/21   Historical Provider, MD   atorvastatin (LIPITOR) 20 MG tablet Take 20 mg by mouth nightly 4/17/21   Historical Provider, MD   cyanocobalamin 1000 MCG tablet Take 1,000 mcg by mouth daily 4/17/21   Historical Provider, MD   fenofibrate (TRICOR) 145 MG tablet Take 145 mg by mouth daily 1/26/23   Historical Provider, MD   glimepiride (AMARYL) 4 MG tablet Take 4 mg by mouth with breakfast and with evening meal 1/26/23   Historical Provider, MD   hydrOXYzine HCl (ATARAX) 25 MG tablet Take 25 mg by mouth nightly as needed 2/4/21   Historical Provider, MD   metFORMIN (GLUCOPHAGE) 500 MG tablet Take 500 mg by mouth 2 times daily (with meals) 1/26/23   Historical Provider, MD   insulin aspart (NOVOLOG FLEXPEN) 100 UNIT/ML injection pen Inject into the skin 3 times daily (with meals) 12/3/19   Historical Provider, MD   metoprolol succinate (TOPROL XL) 50 MG extended release tablet Take 50 mg by mouth daily 1/26/23   Historical Provider, MD   MYRBETRIQ 25 MG TB24 Take 25 mg by mouth daily 1/26/23   Historical Provider, MD   QUEtiapine (SEROQUEL) 25 MG tablet Take 25 mg by mouth every evening 1/26/23   Historical Provider, MD   sertraline (ZOLOFT) 25 MG tablet Take 25 mg by mouth daily 1/26/23   Historical Provider, MD   spironolactone (ALDACTONE) 25 MG tablet Take 25 mg by mouth daily 1/26/23   Historical Provider, MD        Allergies:       Patient has no known allergies. Social History:     Tobacco:    reports that she has never smoked. She has never used smokeless tobacco.  Alcohol:      reports that she does not currently use alcohol.   Drug Use:  reports that she does not currently use drugs. Family History:     No family history on file. Physical Exam:     Vitals:  BP (!) 97/56   Pulse 91   Temp 98.2 °F (36.8 °C) (Axillary)   Resp 20   Ht 5' 11\" (1.803 m)   Wt 254 lb (115.2 kg)   SpO2 96%   BMI 35.43 kg/m²   Temp (24hrs), Av °F (37.2 °C), Min:98.2 °F (36.8 °C), Max:99.5 °F (37.5 °C)          General appearance - lethargic, and in mild acute distress, obese.   Complaining of right-sided headache  Mental status -lethargic  Head - normocephalic and atraumatic  Eyes - pupils equal and reactive though dilated, extraocular eye movements intact, conjunctiva clear  Ears - hearing appears to be intact  Nose - no drainage noted  Mouth - mucous membranes moist  Neck - supple, no carotid bruits, thyroid not palpable  Chest -reduced breath sounds bilateral, clear to auscultation, normal effort  Heart - normal rate, regular rhythm, no murmur  Abdomen - soft, obese, nontender, nondistended, bowel sounds present all four quadrants, no masses, hepatomegaly or splenomegaly  Neurological -lethargic  Extremities - no pedal edema or calf pain with palpation  Skin - no gross lesions, rashes, or induration noted        Data:     Labs:    Hematology:  Recent Labs     23  1834   WBC 14.3* 14.4*  --    RBC 4.57 4.37  --    HGB 12.5 11.7*  --    HCT 38.1 36.3  --    MCV 83.4 83.1  --    MCH 27.4 26.8  --    MCHC 32.8 32.2  --    RDW 14.6* 14.6*  --    * 107*  --    MPV 10.2 10.7  --    CRP  --   --  153.1*       Chemistry:  Recent Labs     23  0623  0625    132* 135   K 4.0 4.0 4.4    100 102   CO2 21 21 22   GLUCOSE 231* 207* 125*   BUN 29* 27* 34*   CREATININE 0.86 0.89 1.04*   ANIONGAP 11 11 11   LABGLOM >60 >60 >60   CALCIUM 8.9 8.7 8.5*       Recent Labs     23  0637 23  0837 23  1120 23  1641 23  1958 23  0608 23  1143 23  1557   LABA1C 7.7*  -- --   --   --   --   --   --    POCGLU  --    < > 268* 237* 178* 125* 217* 238*    < > = values in this interval not displayed. Lab Results   Component Value Date    INR 1.3 03/01/2023    INR 1.7 02/05/2023    INR 1.1 08/24/2012    PROTIME 16.2 (H) 03/01/2023    PROTIME 19.9 (H) 02/05/2023    PROTIME 11.4 08/24/2012       Lab Results   Component Value Date/Time    SPECIAL NOT REPORTED 08/29/2012 06:42 PM     Lab Results   Component Value Date/Time    CULTURE ESCHERICHIA COLI >636235 CFU/ML (A) 02/07/2023 06:24 PM       No results found for: POCPH, PHART, PH, POCPCO2, DSV7LID, PCO2, POCPO2, PO2ART, PO2, POCHCO3, YXT3MVW, HCO3, NBEA, PBEA, BEART, BE, THGBART, THB, BTB4GOT, GMTQ4TLH, O4DANETG, O2SAT, FIO2    Radiology:    No results found. All radiological studies reviewed                Code Status:  Full Code    Electronically signed by Giovany Dickerson MD on 3/4/2023 at 6:25 PM     Copy sent to Dr. Sofi Yanez MD    This note was created with the assistance of a speech-recognition program.  Although the intention is to generate a document that actually reflects the content of the visit, no guarantees can be provided that every mistake has been identified and corrected by editing. Note was updated later by me after  physical examination and  completion of the assessment.

## 2023-03-05 ENCOUNTER — APPOINTMENT (OUTPATIENT)
Dept: GENERAL RADIOLOGY | Age: 63
DRG: 064 | End: 2023-03-05
Payer: MEDICARE

## 2023-03-05 LAB
ABSOLUTE EOS #: <0.03 K/UL (ref 0–0.44)
ABSOLUTE EOS #: <0.03 K/UL (ref 0–0.44)
ABSOLUTE IMMATURE GRANULOCYTE: 0.13 K/UL (ref 0–0.3)
ABSOLUTE IMMATURE GRANULOCYTE: 0.21 K/UL (ref 0–0.3)
ABSOLUTE LYMPH #: 1.6 K/UL (ref 1.1–3.7)
ABSOLUTE LYMPH #: 2.2 K/UL (ref 1.1–3.7)
ABSOLUTE MONO #: 1.02 K/UL (ref 0.1–1.2)
ABSOLUTE MONO #: 1.21 K/UL (ref 0.1–1.2)
ANION GAP SERPL CALCULATED.3IONS-SCNC: 11 MMOL/L (ref 9–17)
BASOPHILS # BLD: 0 % (ref 0–2)
BASOPHILS # BLD: 0 % (ref 0–2)
BASOPHILS ABSOLUTE: 0.03 K/UL (ref 0–0.2)
BASOPHILS ABSOLUTE: <0.03 K/UL (ref 0–0.2)
BUN SERPL-MCNC: 28 MG/DL (ref 8–23)
BUN/CREAT BLD: 32 (ref 9–20)
CALCIUM SERPL-MCNC: 7.9 MG/DL (ref 8.6–10.4)
CHLORIDE SERPL-SCNC: 100 MMOL/L (ref 98–107)
CO2 SERPL-SCNC: 20 MMOL/L (ref 20–31)
CREAT SERPL-MCNC: 0.88 MG/DL (ref 0.5–0.9)
EOSINOPHILS RELATIVE PERCENT: 0 % (ref 1–4)
EOSINOPHILS RELATIVE PERCENT: 0 % (ref 1–4)
FIO2: 35
GFR SERPL CREATININE-BSD FRML MDRD: >60 ML/MIN/1.73M2
GLUCOSE BLD-MCNC: 208 MG/DL (ref 65–105)
GLUCOSE BLD-MCNC: 227 MG/DL (ref 65–105)
GLUCOSE BLD-MCNC: 230 MG/DL (ref 65–105)
GLUCOSE SERPL-MCNC: 249 MG/DL (ref 70–99)
HCT VFR BLD AUTO: 32.4 % (ref 36.3–47.1)
HCT VFR BLD AUTO: 36.6 % (ref 36.3–47.1)
HGB BLD-MCNC: 10.3 G/DL (ref 11.9–15.1)
HGB BLD-MCNC: 12 G/DL (ref 11.9–15.1)
IMMATURE GRANULOCYTES: 1 %
IMMATURE GRANULOCYTES: 1 %
LACTIC ACID, SEPSIS: 1.4 MMOL/L (ref 0.5–1.9)
LYMPHOCYTES # BLD: 18 % (ref 24–43)
LYMPHOCYTES # BLD: 9 % (ref 24–43)
MCH RBC QN AUTO: 27.2 PG (ref 25.2–33.5)
MCH RBC QN AUTO: 28.3 PG (ref 25.2–33.5)
MCHC RBC AUTO-ENTMCNC: 31.8 G/DL (ref 28.4–34.8)
MCHC RBC AUTO-ENTMCNC: 32.8 G/DL (ref 28.4–34.8)
MCV RBC AUTO: 85.5 FL (ref 82.6–102.9)
MCV RBC AUTO: 86.3 FL (ref 82.6–102.9)
MONOCYTES # BLD: 6 % (ref 3–12)
MONOCYTES # BLD: 8 % (ref 3–12)
MYOGLOBIN SERPL-MCNC: 24 NG/ML (ref 25–58)
MYOGLOBIN SERPL-MCNC: 27 NG/ML (ref 25–58)
MYOGLOBIN SERPL-MCNC: 38 NG/ML (ref 25–58)
NEGATIVE BASE EXCESS, ART: 4 (ref 0–2)
NRBC AUTOMATED: 0 PER 100 WBC
PARTIAL THROMBOPLASTIN TIME: >150 SEC (ref 23.9–33.8)
PARTIAL THROMBOPLASTIN TIME: >150 SEC (ref 23.9–33.8)
PDW BLD-RTO: 15 % (ref 11.8–14.4)
PDW BLD-RTO: 15.2 % (ref 11.8–14.4)
PLATELET # BLD AUTO: 130 K/UL (ref 138–453)
PLATELET # BLD AUTO: 202 K/UL (ref 138–453)
PMV BLD AUTO: 11.2 FL (ref 8.1–13.5)
PMV BLD AUTO: 11.3 FL (ref 8.1–13.5)
POC HCO3: 17.6 MMOL/L (ref 21–28)
POC O2 SATURATION: 99 % (ref 94–98)
POC PCO2: 22.3 MM HG (ref 35–48)
POC PH: 7.5 (ref 7.35–7.45)
POC PO2: 117.3 MM HG (ref 83–108)
POTASSIUM SERPL-SCNC: 3.7 MMOL/L (ref 3.7–5.3)
RBC # BLD: 3.79 M/UL (ref 3.95–5.11)
RBC # BLD: 4.24 M/UL (ref 3.95–5.11)
RBC # BLD: ABNORMAL 10*6/UL
RBC # BLD: ABNORMAL 10*6/UL
SAMPLE SITE: ABNORMAL
SEG NEUTROPHILS: 73 % (ref 36–65)
SEG NEUTROPHILS: 84 % (ref 36–65)
SEGMENTED NEUTROPHILS ABSOLUTE COUNT: 15.82 K/UL (ref 1.5–8.1)
SEGMENTED NEUTROPHILS ABSOLUTE COUNT: 9.05 K/UL (ref 1.5–8.1)
SODIUM SERPL-SCNC: 131 MMOL/L (ref 135–144)
TROPONIN I SERPL DL<=0.01 NG/ML-MCNC: 146 NG/L (ref 0–14)
TROPONIN I SERPL DL<=0.01 NG/ML-MCNC: 173 NG/L (ref 0–14)
TROPONIN I SERPL DL<=0.01 NG/ML-MCNC: 199 NG/L (ref 0–14)
WBC # BLD AUTO: 12.4 K/UL (ref 3.5–11.3)
WBC # BLD AUTO: 18.9 K/UL (ref 3.5–11.3)

## 2023-03-05 PROCEDURE — 85025 COMPLETE CBC W/AUTO DIFF WBC: CPT

## 2023-03-05 PROCEDURE — 2060000000 HC ICU INTERMEDIATE R&B

## 2023-03-05 PROCEDURE — 02HV33Z INSERTION OF INFUSION DEVICE INTO SUPERIOR VENA CAVA, PERCUTANEOUS APPROACH: ICD-10-PCS | Performed by: STUDENT IN AN ORGANIZED HEALTH CARE EDUCATION/TRAINING PROGRAM

## 2023-03-05 PROCEDURE — 83874 ASSAY OF MYOGLOBIN: CPT

## 2023-03-05 PROCEDURE — 6360000002 HC RX W HCPCS: Performed by: NURSE PRACTITIONER

## 2023-03-05 PROCEDURE — 36592 COLLECT BLOOD FROM PICC: CPT

## 2023-03-05 PROCEDURE — 80048 BASIC METABOLIC PNL TOTAL CA: CPT

## 2023-03-05 PROCEDURE — 82947 ASSAY GLUCOSE BLOOD QUANT: CPT

## 2023-03-05 PROCEDURE — 2580000003 HC RX 258: Performed by: NURSE PRACTITIONER

## 2023-03-05 PROCEDURE — 36415 COLL VENOUS BLD VENIPUNCTURE: CPT

## 2023-03-05 PROCEDURE — 94660 CPAP INITIATION&MGMT: CPT

## 2023-03-05 PROCEDURE — 009U3ZX DRAINAGE OF SPINAL CANAL, PERCUTANEOUS APPROACH, DIAGNOSTIC: ICD-10-PCS | Performed by: PSYCHIATRY & NEUROLOGY

## 2023-03-05 PROCEDURE — 6360000002 HC RX W HCPCS: Performed by: FAMILY MEDICINE

## 2023-03-05 PROCEDURE — 82803 BLOOD GASES ANY COMBINATION: CPT

## 2023-03-05 PROCEDURE — 87040 BLOOD CULTURE FOR BACTERIA: CPT

## 2023-03-05 PROCEDURE — 36600 WITHDRAWAL OF ARTERIAL BLOOD: CPT

## 2023-03-05 PROCEDURE — 84484 ASSAY OF TROPONIN QUANT: CPT

## 2023-03-05 PROCEDURE — 6370000000 HC RX 637 (ALT 250 FOR IP): Performed by: FAMILY MEDICINE

## 2023-03-05 PROCEDURE — 94761 N-INVAS EAR/PLS OXIMETRY MLT: CPT

## 2023-03-05 PROCEDURE — 2700000000 HC OXYGEN THERAPY PER DAY

## 2023-03-05 PROCEDURE — 99233 SBSQ HOSP IP/OBS HIGH 50: CPT | Performed by: PSYCHIATRY & NEUROLOGY

## 2023-03-05 PROCEDURE — 6370000000 HC RX 637 (ALT 250 FOR IP): Performed by: NURSE PRACTITIONER

## 2023-03-05 PROCEDURE — 2580000003 HC RX 258: Performed by: FAMILY MEDICINE

## 2023-03-05 PROCEDURE — 85730 THROMBOPLASTIN TIME PARTIAL: CPT

## 2023-03-05 PROCEDURE — 83605 ASSAY OF LACTIC ACID: CPT

## 2023-03-05 PROCEDURE — 36573 INSJ PICC RS&I 5 YR+: CPT

## 2023-03-05 PROCEDURE — 71045 X-RAY EXAM CHEST 1 VIEW: CPT

## 2023-03-05 RX ORDER — METOPROLOL SUCCINATE 25 MG/1
25 TABLET, EXTENDED RELEASE ORAL ONCE
Status: COMPLETED | OUTPATIENT
Start: 2023-03-05 | End: 2023-03-05

## 2023-03-05 RX ORDER — METOPROLOL SUCCINATE 25 MG/1
25 TABLET, EXTENDED RELEASE ORAL 2 TIMES DAILY
Status: DISCONTINUED | OUTPATIENT
Start: 2023-03-05 | End: 2023-03-05

## 2023-03-05 RX ORDER — FUROSEMIDE 10 MG/ML
20 INJECTION INTRAMUSCULAR; INTRAVENOUS ONCE
Status: COMPLETED | OUTPATIENT
Start: 2023-03-05 | End: 2023-03-05

## 2023-03-05 RX ORDER — DEXTROSE MONOHYDRATE 100 MG/ML
INJECTION, SOLUTION INTRAVENOUS CONTINUOUS PRN
Status: DISCONTINUED | OUTPATIENT
Start: 2023-03-05 | End: 2023-03-09 | Stop reason: HOSPADM

## 2023-03-05 RX ORDER — INSULIN LISPRO 100 [IU]/ML
0-4 INJECTION, SOLUTION INTRAVENOUS; SUBCUTANEOUS NIGHTLY
Status: DISCONTINUED | OUTPATIENT
Start: 2023-03-05 | End: 2023-03-09 | Stop reason: HOSPADM

## 2023-03-05 RX ORDER — INSULIN LISPRO 100 [IU]/ML
0-8 INJECTION, SOLUTION INTRAVENOUS; SUBCUTANEOUS
Status: DISCONTINUED | OUTPATIENT
Start: 2023-03-06 | End: 2023-03-09 | Stop reason: HOSPADM

## 2023-03-05 RX ADMIN — METOPROLOL SUCCINATE 75 MG: 50 TABLET, EXTENDED RELEASE ORAL at 08:20

## 2023-03-05 RX ADMIN — METFORMIN HYDROCHLORIDE 500 MG: 500 TABLET, FILM COATED ORAL at 08:21

## 2023-03-05 RX ADMIN — ATORVASTATIN CALCIUM 20 MG: 20 TABLET, FILM COATED ORAL at 20:12

## 2023-03-05 RX ADMIN — GLIPIZIDE 2.5 MG: 5 TABLET ORAL at 06:28

## 2023-03-05 RX ADMIN — SODIUM CHLORIDE, PRESERVATIVE FREE 10 ML: 5 INJECTION INTRAVENOUS at 20:17

## 2023-03-05 RX ADMIN — HEPARIN SODIUM 18 UNITS/KG/HR: 10000 INJECTION, SOLUTION INTRAVENOUS at 10:00

## 2023-03-05 RX ADMIN — GABAPENTIN 100 MG: 100 CAPSULE ORAL at 20:12

## 2023-03-05 RX ADMIN — CEFTRIAXONE SODIUM 1000 MG: 1 INJECTION, POWDER, FOR SOLUTION INTRAMUSCULAR; INTRAVENOUS at 17:24

## 2023-03-05 RX ADMIN — SERTRALINE 25 MG: 25 TABLET, FILM COATED ORAL at 08:20

## 2023-03-05 RX ADMIN — SPIRONOLACTONE 25 MG: 25 TABLET ORAL at 08:19

## 2023-03-05 RX ADMIN — ANTI-FUNGAL POWDER MICONAZOLE NITRATE TALC FREE: 1.42 POWDER TOPICAL at 13:07

## 2023-03-05 RX ADMIN — FUROSEMIDE 20 MG: 10 INJECTION, SOLUTION INTRAMUSCULAR; INTRAVENOUS at 02:44

## 2023-03-05 RX ADMIN — DILTIAZEM HYDROCHLORIDE 120 MG: 60 CAPSULE, EXTENDED RELEASE ORAL at 08:19

## 2023-03-05 RX ADMIN — SODIUM CHLORIDE: 9 INJECTION, SOLUTION INTRAVENOUS at 20:23

## 2023-03-05 RX ADMIN — METOPROLOL SUCCINATE 25 MG: 25 TABLET, EXTENDED RELEASE ORAL at 02:47

## 2023-03-05 RX ADMIN — QUETIAPINE FUMARATE 25 MG: 25 TABLET ORAL at 17:15

## 2023-03-05 RX ADMIN — INSULIN LISPRO 2 UNITS: 100 INJECTION, SOLUTION INTRAVENOUS; SUBCUTANEOUS at 08:18

## 2023-03-05 RX ADMIN — GABAPENTIN 100 MG: 100 CAPSULE ORAL at 08:19

## 2023-03-05 RX ADMIN — FUROSEMIDE 40 MG: 40 TABLET ORAL at 08:19

## 2023-03-05 RX ADMIN — SODIUM CHLORIDE, PRESERVATIVE FREE 10 ML: 5 INJECTION INTRAVENOUS at 08:22

## 2023-03-05 RX ADMIN — ANTI-FUNGAL POWDER MICONAZOLE NITRATE TALC FREE: 1.42 POWDER TOPICAL at 08:21

## 2023-03-05 RX ADMIN — DILTIAZEM HYDROCHLORIDE 120 MG: 60 CAPSULE, EXTENDED RELEASE ORAL at 20:12

## 2023-03-05 RX ADMIN — FENOFIBRATE 54 MG: 54 TABLET ORAL at 08:21

## 2023-03-05 RX ADMIN — METFORMIN HYDROCHLORIDE 500 MG: 500 TABLET, FILM COATED ORAL at 17:14

## 2023-03-05 RX ADMIN — ONDANSETRON 4 MG: 2 INJECTION INTRAMUSCULAR; INTRAVENOUS at 07:01

## 2023-03-05 RX ADMIN — GABAPENTIN 100 MG: 100 CAPSULE ORAL at 13:08

## 2023-03-05 RX ADMIN — INSULIN LISPRO 2 UNITS: 100 INJECTION, SOLUTION INTRAVENOUS; SUBCUTANEOUS at 12:19

## 2023-03-05 RX ADMIN — ANTI-FUNGAL POWDER MICONAZOLE NITRATE TALC FREE: 1.42 POWDER TOPICAL at 17:15

## 2023-03-05 NOTE — PLAN OF CARE
Problem: Discharge Planning  Goal: Discharge to home or other facility with appropriate resources  3/5/2023 0450 by James Bradley RN  Outcome: Progressing  3/5/2023 0117 by Adeola Braun RN  Outcome: Progressing  Flowsheets (Taken 3/4/2023 2300 by James Bradley RN)  Discharge to home or other facility with appropriate resources:   Identify barriers to discharge with patient and caregiver   Arrange for needed discharge resources and transportation as appropriate   Identify discharge learning needs (meds, wound care, etc)  3/4/2023 1538 by Tristian Dorado RN  Note: Progressing  3/4/2023 1534 by Tristian Dorado RN  Outcome: Progressing  Note: Progressing     Problem: Pain  Goal: Verbalizes/displays adequate comfort level or baseline comfort level  3/5/2023 0450 by James Bradley RN  Outcome: Progressing  3/5/2023 0117 by Adeola Braun RN  Outcome: Progressing  Flowsheets (Taken 3/4/2023 2300 by James Bradley RN)  Verbalizes/displays adequate comfort level or baseline comfort level:   Encourage patient to monitor pain and request assistance   Assess pain using appropriate pain scale   Implement non-pharmacological measures as appropriate and evaluate response  3/4/2023 1538 by Tristian Dorado RN  Outcome: Progressing  Note: Progressing  3/4/2023 1534 by Tristian Dorado RN  Outcome: Progressing  Flowsheets (Taken 3/4/2023 1534)  Verbalizes/displays adequate comfort level or baseline comfort level:   Encourage patient to monitor pain and request assistance   Assess pain using appropriate pain scale   Administer analgesics based on type and severity of pain and evaluate response  Note: Progressing     Problem: Confusion  Goal: Confusion, delirium, dementia, or psychosis is improved or at baseline  Description: INTERVENTIONS:  1.  Assess for possible contributors to thought disturbance, including medications, impaired vision or hearing, underlying metabolic abnormalities, dehydration, psychiatric diagnoses, and notify attending LIP  2. [place] high risk fall precautions, as indicated  3. Provide frequent short contacts to provide reality reorientation, refocusing and direction  4. Decrease environmental stimuli, including noise as appropriate  5. Monitor and intervene to maintain adequate nutrition, hydration, elimination, sleep and activity  6. If unable to ensure safety without constant attention obtain sitter and review sitter guidelines with assigned personnel  7. Initiate Psychosocial CNS and Spiritual Care consult, as indicated  3/5/2023 0450 by Jhonny Benito RN  Outcome: Progressing  3/5/2023 0117 by Vernita Baumgarten, RN  Outcome: Progressing  3/4/2023 1534 by Santo Santillan RN  Outcome: Progressing  Note: Progressing     Problem: Skin/Tissue Integrity  Goal: Absence of new skin breakdown  Description: 1. Monitor for areas of redness and/or skin breakdown  2. Assess vascular access sites hourly  3. Every 4-6 hours minimum:  Change oxygen saturation probe site  4. Every 4-6 hours:  If on nasal continuous positive airway pressure, respiratory therapy assess nares and determine need for appliance change or resting period.   3/5/2023 0450 by Jhonny Benito RN  Outcome: Progressing  3/5/2023 0117 by Vernita Baumgarten, RN  Outcome: Progressing  3/4/2023 1538 by Santo Santillan RN  Outcome: Progressing  Note: Progressing  3/4/2023 1534 by Santo Santillan RN  Outcome: Progressing  Note: Progressing     Problem: Safety - Adult  Goal: Free from fall injury  3/5/2023 0450 by Jhonny Benito RN  Outcome: Progressing  Flowsheets (Taken 3/5/2023 0200)  Free From Fall Injury:   Instruct family/caregiver on patient safety   Based on caregiver fall risk screen, instruct family/caregiver to ask for assistance with transferring infant if caregiver noted to have fall risk factors  3/5/2023 0117 by Vernita Baumgarten, RN  Outcome: Progressing  3/4/2023 1538 by Santo Santillan RN  Outcome: Progressing  Note: Progressing  3/4/2023 1534 by Santo Santillan RN  Outcome: Progressing  Note: Progressing       Problem: Chronic Conditions and Co-morbidities  Goal: Patient's chronic conditions and co-morbidity symptoms are monitored and maintained or improved  3/5/2023 0450 by Donavan Deutsch RN  Outcome: Progressing  3/5/2023 0117 by Shirley Villa RN  Outcome: Progressing  Flowsheets (Taken 3/4/2023 2300 by Donavan Deutsch RN)  Care Plan - Patient's Chronic Conditions and Co-Morbidity Symptoms are Monitored and Maintained or Improved:   Monitor and assess patient's chronic conditions and comorbid symptoms for stability, deterioration, or improvement   Collaborate with multidisciplinary team to address chronic and comorbid conditions and prevent exacerbation or deterioration  3/4/2023 1534 by Anjali Khan RN  Outcome: Progressing  Note: Progressing       Problem: Neurosensory - Adult  Goal: Achieves stable or improved neurological status  3/5/2023 0450 by Donavan Deutsch RN  Outcome: Progressing  3/5/2023 0117 by Shirley Villa RN  Outcome: Progressing  Flowsheets (Taken 3/4/2023 2300 by Donavan Deutsch RN)  Achieves stable or improved neurological status:   Assess for and report changes in neurological status   Initiate measures to prevent increased intracranial pressure   Maintain blood pressure and fluid volume within ordered parameters to optimize cerebral perfusion and minimize risk of hemorrhage  3/4/2023 1538 by Anjali Khan RN  Outcome: Progressing  Flowsheets (Taken 3/4/2023 1538)  Achieves stable or improved neurological status: Assess for and report changes in neurological status  Note: Progressing     Problem: Musculoskeletal - Adult  Goal: Return mobility to safest level of function  3/5/2023 0450 by Donavan Deutsch RN  Outcome: Progressing  3/5/2023 0117 by Shirley Villa RN  Outcome: Progressing  Flowsheets (Taken 3/4/2023 2300 by Donavan Deutsch RN)  Return Mobility to Safest Level of Function:   Ensure adequate protection for wounds/incisions during mobilization   Obtain physical therapy/occupational therapy consults as needed   Apply continuous passive motion per provider or physical therapy orders to increase flexion toward goal  3/4/2023 1538 by Odilon Lares RN  Outcome: Progressing  Flowsheets (Taken 3/4/2023 1538)  Return Mobility to Safest Level of Function: Assess patient stability and activity tolerance for standing, transferring and ambulating with or without assistive devices  Note: Progressing     Problem: Respiratory - Adult  Goal: Achieves optimal ventilation and oxygenation  3/5/2023 0450 by Akbar Larson RN  Outcome: Progressing  3/5/2023 0140 by Manish Mendez RCP  Outcome: Progressing  Flowsheets (Taken 3/4/2023 2300 by Akbar Larson RN)  Achieves optimal ventilation and oxygenation:   Assess for changes in respiratory status   Assess for changes in mentation and behavior   Position to facilitate oxygenation and minimize respiratory effort   Oxygen supplementation based on oxygen saturation or arterial blood gases  Goal: Able to breathe comfortably  3/5/2023 0140 by Manish Mendez RCP  Outcome: Progressing  Goal: Normal respiration  3/5/2023 0140 by Manish Mendez RCP  Outcome: Progressing  Goal: Adequate oxygenation  Description: Adequate oxygenation  3/5/2023 0140 by Manish Mendez RCP  Outcome: Progressing

## 2023-03-05 NOTE — FLOWSHEET NOTE
03/04/23 2334   Treatment Team Notification   Reason for Communication Review case  (new consult)   Team Member Name Majo Petty MD   Treatment Team Role Consulting Provider   Method of Communication Secure Message   Response See orders   Notification Time [phone]

## 2023-03-05 NOTE — PROGRESS NOTES
Pt noted to have converted back to sinus rhythm (see strip in chart). Extra dose of Toprol 25 mg given at 0247.

## 2023-03-05 NOTE — PLAN OF CARE
Problem: Respiratory - Adult  Goal: Achieves optimal ventilation and oxygenation  Outcome: Progressing  Flowsheets (Taken 3/4/2023 2300 by Adis Renteria RN)  Achieves optimal ventilation and oxygenation:   Assess for changes in respiratory status   Assess for changes in mentation and behavior   Position to facilitate oxygenation and minimize respiratory effort   Oxygen supplementation based on oxygen saturation or arterial blood gases     Problem: Respiratory - Adult  Goal: Able to breathe comfortably  Outcome: Progressing     Problem: Respiratory - Adult  Goal: Normal respiration  Outcome: Progressing     Problem: Respiratory - Adult  Goal: Adequate oxygenation  Description: Adequate oxygenation  Outcome: Progressing      PROVIDE ADEQUATE OXYGENATION WITH ACCEPTABLE SP02/ABG'S    [x]  IDENTIFY APPROPRIATE OXYGEN THERAPY  [x]   MONITOR SP02/ABG'S AS NEEDED   [x]   PATIENT EDUCATION AS NEEDED      NONINVASIVE VENTILATION    PROVIDE OPTIMAL VENTILATION/ACCEPTABLE SPO2   IMPLEMENT NONINVASIVE VENTILATION PROTOCOL   MAINTAIN ACCEPTABLE SPO2   ASSESS SKIN INTEGRITY/BREAKDOWN SCORE   PATIENT EDUCATION AS NEEDED   BIPAP AS NEEDED

## 2023-03-05 NOTE — FLOWSHEET NOTE
03/05/23 0027   Treatment Team Notification   Reason for Communication Evaluate   Team Member Name PAXTON Ko MD   Treatment Team Role Consulting Provider  (covering for Oni Rondon)   Method of Communication Call   Response See orders;Other (Comment)  (Dr Ko requests house NP puts eyes on pt and request presence of house NP while Adenosine IV given once Mitra Lump, NP gives ok for house NP to be at bedside. Dr. Ko states will place orders once logs into babbel)   Notification Time 0027

## 2023-03-05 NOTE — PROGRESS NOTES
Pt arrives from med/surg via bed with RN. Pt placed on monitors and oriented to room and call light use. Review of medications due and those given with M/S nurse.

## 2023-03-05 NOTE — CONSULTS
Pulmonary Medicine and [address] Chiqu[address] Saul MD      Patient - Carlos Day   MRN -  [id number]   Acct # - [de-identified]   - 1960      Date of Admission -  2023  5:51 PM  Date of evaluation -  3/5/2023  Room - -   Hospital Day - 35 Carmen Davison MD Primary Care Physician - Sofi Yanez MD     Reason for Consult    Critical care management    Assessment   Acute hypoxic respiratory failure  Pulmonary edema  SVT/A-fib/CHF/mild mitral stenosis  Suspected obstructive sleep apnea/Obesity  Acute microinfarcts caudate head and right thalamus on MRI brain  E. coli UTI  Weakness/inability to ambulate  Cognitive impairment/MRDD  A-fib, DM, HTN, HLD, PE   Recommendations   Oxygen via nasal cannula, keep SPO2 90% or greater  BiPAP support as needed  Precedex drip if necessary  Diuresis with oral Lasix, status post one-time dose 20 mg IV overnight  Neurology following, spinal tap on Monday to rule out inflammatory etiology for patient's symptoms  Vasculitis panel pending  Cardiology on consult  Rocephin for UTI  X-ray chest in am  Labs: CBC and BMP in am  DVT prophylaxis, heparin drip, off Eliquis for lumbar puncture  Patient is a difficult blood draw. Discussed with RN. We will arrange for PICC line. Discussed with the family present at the bedside. Will follow with you    Problem List      Patient Active Problem List   Diagnosis    New onset of congestive heart failure (HCC)    Paroxysmal supraventricular tachycardia (HCC)    Acute intractable tension-type headache    Post-concussion headache    Urinary tract infection without hematuria    Hypokalemia    Weakness       HPI     Carlos Day is 60 y.o.,  female who presented initially to the emergency room with fatigue and unable to walk/stand after being discharged from [facility] on 2023. Patient has a history of cognitive impairment/MRDD.   Patient has been undergoing work-up since being in the hospital, overnight she went into SVT and developed respiratory distress and was transferred to the intensive care unit for BiPAP support. X-ray chest revealed pulmonary edema. She was given IV Lasix 20 mg x 1 dose with improvement in symptoms. She is currently resting in bed caregiver at bedside. She is able to communicate, feels shortness of breath is a little better. She is on nasal cannula. She denies chest pain. She has occasional cough. PMHx   Past Medical History      Diagnosis Date    Cerebral artery occlusion with cerebral infarction ([facility] [address]     Diabetes mellitus ([facility] [address]     HTN (hypertension)     Mental disability     Pulmonary emboli (HCC)     SVT (supraventricular tachycardia) ([facility] [address]       Past Surgical History    No past surgical history on file.     Meds    Current Medications    metoprolol succinate  75 mg Oral Daily    sodium chloride  80 mL IntraVENous Once    cefTRIAXone (ROCEPHIN) IV  1,000 mg IntraVENous Q24H    [Held by provider] aspirin  325 mg Oral Daily    miconazole   Topical 4x Daily    gabapentin  100 mg Oral TID    insulin lispro  0-8 Units SubCUTAneous TID WC    insulin lispro  0-4 Units SubCUTAneous Nightly    [Held by provider] apixaban  5 mg Oral BID    atorvastatin  20 mg Oral Nightly    dilTIAZem  120 mg Oral BID    fenofibrate  54 mg Oral Daily    furosemide  40 mg Oral Daily    glipiZIDE  2.5 mg Oral QAM AC    metFORMIN  500 mg Oral BID WC    QUEtiapine  25 mg Oral QPM    sertraline  25 mg Oral Daily    spironolactone  25 mg Oral Daily    sodium chloride flush  5-40 mL IntraVENous 2 times per day     heparin (porcine), heparin (porcine), acetaminophen, glucose, dextrose bolus **OR** dextrose bolus, glucagon (rDNA), dextrose, sodium chloride flush, sodium chloride, potassium chloride **OR** potassium alternative oral replacement **OR** potassium chloride, magnesium sulfate, ondansetron **OR** ondansetron, polyethylene glycol  IV Drips/Infusions   heparin (PORCINE) Infusion 18 Units/kg/hr (03/04/23 2213)    dexmedetomidine (PRECEDEX) IV infusion Stopped (03/05/23 0308)    sodium chloride 125 mL/hr at 03/04/23 1813    dextrose      sodium chloride       Home Medications  Medications Prior to Admission: dilTIAZem (CARDIZEM 12 HR) 120 MG extended release capsule, Take 120 mg by mouth 2 times daily  potassium chloride (KLOR-CON M) 20 MEQ extended release tablet, Take 20 mEq by mouth daily  Icosapent Ethyl (VASCEPA) 1 g CAPS capsule, Take 2 capsules by mouth 2 times daily  [DISCONTINUED] budesonide (PULMICORT) 0.5 MG/2ML nebulizer suspension, Take 1 ampule by nebulization daily  [DISCONTINUED] potassium chloride (KLOR-CON M) 20 MEQ extended release tablet, Take 1 tablet by mouth daily for 10 days  furosemide (LASIX) 40 MG tablet, Take 1 tablet by mouth daily  methenamine (HIPREX) 1 g tablet, Take 1 g by mouth 2 times daily (with meals)  apixaban (ELIQUIS) 5 MG TABS tablet, Take 5 mg by mouth 2 times daily  atorvastatin (LIPITOR) 20 MG tablet, Take 20 mg by mouth nightly  cyanocobalamin 1000 MCG tablet, Take 1,000 mcg by mouth daily  fenofibrate (TRICOR) 145 MG tablet, Take 145 mg by mouth daily  glimepiride (AMARYL) 4 MG tablet, Take 4 mg by mouth with breakfast and with evening meal  hydrOXYzine HCl (ATARAX) 25 MG tablet, Take 25 mg by mouth nightly as needed  metFORMIN (GLUCOPHAGE) 500 MG tablet, Take 500 mg by mouth 2 times daily (with meals)  insulin aspart (NOVOLOG FLEXPEN) 100 UNIT/ML injection pen, Inject into the skin 3 times daily (with meals)  metoprolol succinate (TOPROL XL) 50 MG extended release tablet, Take 50 mg by mouth daily  MYRBETRIQ 25 MG TB24, Take 25 mg by mouth daily  QUEtiapine (SEROQUEL) 25 MG tablet, Take 25 mg by mouth every evening  sertraline (ZOLOFT) 25 MG tablet, Take 25 mg by mouth daily  spironolactone (ALDACTONE) 25 MG tablet, Take 25 mg by mouth daily    Allergies    Patient has no known allergies.   Social History     Social History     Tobacco Use    Smoking status: Never    Smokeless tobacco: Never   Substance Use Topics    Alcohol use: Not Currently     Family History    No family history on file. ROS - 11 systems   General Denies any fever or chills  HEENT Denies any diplopia, tinnitus or vertigo  Resp positive for  dry cough and dyspnea  Cardiac Denies any chest pain, palpitations, claudication or edema  GI Denies any melena, hematochezia, hematemesis or pyrosis   Denies any frequency, urgency, hesitancy or incontinence  Heme Denies bruising or bleeding easily  Endocrine Denies any history of diabetes or thyroid disease  Neuro Denies any focal motor or sensory deficits  Psychiatric Denies anxiety, depression, suicidal ideation  Skin Denies rashes, itching, open sores    Vitals     height is 5' 11\" (1.803 m) and weight is 257 lb 6.4 oz (116.8 kg). Her temporal temperature is 98 °F (36.7 °C). Her blood pressure is 116/86 and her pulse is 123 (abnormal). Her respiration is 27 and oxygen saturation is 90%. Body mass index is 35.9 kg/m². I/O      Intake/Output Summary (Last 24 hours) at 3/5/2023 0907  Last data filed at 3/5/2023 0829  Gross per 24 hour   Intake 370 ml   Output 350 ml   Net 20 ml     I/O last 3 completed shifts: In: 10 [I.V.:10]  Out: 750 [Urine:750]   Patient Vitals for the past 96 hrs (Last 3 readings):   Weight   03/05/23 0400 257 lb 6.4 oz (116.8 kg)   03/05/23 0000 261 lb (118.4 kg)   03/04/23 0358 254 lb (115.2 kg)     Exam   General Appearance Awake, alert, oriented, in no acute distress  HEENT - Head is normocephalic, atraumatic. Pupil reactive to light  Neck - Supple,  trachea midline and straight  Lungs -moderate air exchange, diminished bilateral bases with occasional crackle  Cardiovascular - Heart sounds are normal.  Regular rhythm normal rate without murmur, gallop or rub. Abdomen - Soft, nontender, nondistended, no masses or organomegaly  Neurologic - CN II-XII are grossly intact.  There are no focal motor or sensory deficits  Skin - No bruising or bleeding  Extremities - No cyanosis, clubbing or edema    Labs  - Old records and notes have been reviewed in [facility]   CBC     Lab Results   Component Value Date/Time    WBC 18.9 03/04/2023 11:06 PM    RBC 4.24 03/04/2023 11:06 PM    HGB 12.0 03/04/2023 11:06 PM    HCT 36.6 03/04/2023 11:06 PM     03/04/2023 11:06 PM    MCV 86.3 03/04/2023 11:06 PM    MCH 28.3 03/04/2023 11:06 PM    MCHC 32.8 03/04/2023 11:06 PM    RDW 15.2 03/04/2023 11:06 PM    LYMPHOPCT 9 03/04/2023 11:06 PM    MONOPCT 6 03/04/2023 11:06 PM    BASOPCT 0 03/04/2023 11:06 PM    MONOSABS 1.21 03/04/2023 11:06 PM    LYMPHSABS 1.60 03/04/2023 11:06 PM    EOSABS <0.03 03/04/2023 11:06 PM    BASOSABS 0.03 03/04/2023 11:06 PM    DIFFTYPE NOT REPORTED 05/07/2014 08:30 AM     BMP   Lab Results   Component Value Date/Time     03/04/2023 06:25 AM    K 4.4 03/04/2023 06:25 AM     03/04/2023 06:25 AM    CO2 22 03/04/2023 06:25 AM    BUN 34 03/04/2023 06:25 AM    CREATININE 1.04 03/04/2023 06:25 AM    GLUCOSE 125 03/04/2023 06:25 AM    CALCIUM 8.5 03/04/2023 06:25 AM    MG 1.7 02/21/2023 06:13 AM     LFTS  Lab Results   Component Value Date/Time    ALKPHOS 36 05/07/2014 08:30 AM    ALT 17 05/07/2014 08:30 AM    AST 22 05/07/2014 08:30 AM    PROT 7.1 05/07/2014 08:30 AM    BILITOT 0.30 05/07/2014 08:30 AM    BILIDIR 0.12 05/07/2014 08:30 AM    IBILI 0.18 05/07/2014 08:30 AM    LABALBU 3.6 05/07/2014 08:30 AM     PTT  Lab Results   Component Value Date    APTT 36.5 (H) 03/04/2023     INR   Lab Results   Component Value Date    INR 1.3 03/01/2023    INR 1.7 02/05/2023    INR 1.1 08/24/2012    PROTIME 16.2 (H) 03/01/2023    PROTIME 19.9 (H) 02/05/2023    PROTIME 11.4 08/24/2012       Radiology    CXR       (See actual reports for details)    \"Thank you for asking us to see this patient\"    Case discussed with nurse and patient. Questions and concerns addressed.     Electronically signed by   Marilyn Escobar MD on 3/5/2023 at 9:39 AM  Pulmonary Critical Care and Sleep Medicine,  [facility]  Cell: [phone]  Office: [phone]

## 2023-03-05 NOTE — FLOWSHEET NOTE
03/04/23 2300   Treatment Team Notification   Reason for Communication Review case  (consult)   Team Member Name Skyla Valle MD   Treatment Team Role Consulting Provider   Method of Communication Call   Response Waiting for response   Notification Time [phone]

## 2023-03-05 NOTE — FLOWSHEET NOTE
03/05/23 0204   Treatment Team Notification   Reason for Communication Evaluate;Critical results   Type of Critical Result Laboratory  (troponin increased from 155 to 199)   Critical Lab Result Type Troponin   Team Member Name [facility]   Treatment Team Role Consulting Provider   Method of Communication Call   Response Waiting for response   Notification Time [phone] [id number]

## 2023-03-05 NOTE — PROGRESS NOTES
[facility] ([facility]) Neurology Specialist  [address]  [facility] [address] [facility]:  [phone] or [phone]  FAX:  [phone]            Brief history: Diaz Gunderson is a 60 y.o. old female admitted on 2/28/2023 with encephalopathy     Subjective: Overnight events are reviewed. The patient had episode of SVT and has now been transferred to ICU. Objective: /86   Pulse (!) 122   Temp 98 °F (36.7 °C) (Temporal)   Resp 24   Ht 5' 11\" (1.803 m)   Wt 257 lb 6.4 oz (116.8 kg)   SpO2 94%   BMI 35.90 kg/m²       Medications:    metoprolol succinate  75 mg Oral Daily    sodium chloride  80 mL IntraVENous Once    cefTRIAXone (ROCEPHIN) IV  1,000 mg IntraVENous Q24H    [Held by provider] aspirin  325 mg Oral Daily    miconazole   Topical 4x Daily    gabapentin  100 mg Oral TID    insulin lispro  0-8 Units SubCUTAneous TID WC    insulin lispro  0-4 Units SubCUTAneous Nightly    [Held by provider] apixaban  5 mg Oral BID    atorvastatin  20 mg Oral Nightly    dilTIAZem  120 mg Oral BID    fenofibrate  54 mg Oral Daily    furosemide  40 mg Oral Daily    glipiZIDE  2.5 mg Oral QAM AC    metFORMIN  500 mg Oral BID WC    QUEtiapine  25 mg Oral QPM    sertraline  25 mg Oral Daily    spironolactone  25 mg Oral Daily    sodium chloride flush  5-40 mL IntraVENous 2 times per day        Neurological examination:    Mental status   Awake. Speech dysarthric. Cranial nerves   Face is symmetric. Extraocular movements appear intact. Pupils are round and reactive     Motor function  Spontaneous movement in both upper and lower extremities   Sensory function Unable to assess   Cerebellar No visible tremors   Reflex function Intact 2+ DTR and symmetric.  Negative Babinski     Gait                  Not tested       Lab Results   Component Value Date    LDLCHOLESTEROL 69 05/07/2014      No components found for: CHLPL   Lab Results   Component Value Date    TRIG 137 05/07/2014    TRIG 114 12/06/2012      Lab Results   Component Value Date    HDL 39 (L) 05/07/2014    HDL 44 12/06/2012      No results found for: LDLCALC   No results found for: LABVLDL   Lab Results   Component Value Date    LABA1C 7.7 (H) 03/03/2023      Lab Results   Component Value Date     03/03/2023      No results found for: Hilario Higgins     Neurological work up:  Study Finding   CT head    CTA Head and neck Markedly abnormal.  Differential includes vasculitis/chronic dissection/FMD.     MRI brain  4/3/2021 Numerous foci in diffuse regions of periventricular white matter signal abnormalities with involvement of splenium of corpus callosum. Enhancement of subcortical region and periventricular white matter. Etiology unclear. Possible lacunar subacute strokes versus demyelinating disease financing lesions versus cerebritis. 2 D Echo    RAJESH    5 days video EEG monitoring  4/11/2021 Generalized slowing consistent with encephalopathy. Assessment and Recommendations:     Punctate bilateral subcortical ischemic strokes  MRI of the brain is consistent with multiple punctate bilateral ischemic strokes. Etiology could be secondary to fibromuscular dysplasia/CNS vasculitis    Overnight events were reviewed. The patient neurologically unchanged. Oriented to self and able to follow one-step and two commands. Aspirin and Eliquis on hold for spinal tap tomorrow. We will check for CSF glucose, protein, cell count, IgG index, myelin basic protein, encephalitis panel. Vasculitis panel, ESR and CRP are pending     We will follow. Natalia Vargas MD  Neurology    This note is created with the assistance of a speech-recognition program. While intending to generate a document that actually reflects the content of the visit, the document can still have some errors including those of syntax and sound a- like substitutions which may escape proofreading. In such instances, actual meaning can be extrapolated by contextual derivation.

## 2023-03-05 NOTE — FLOWSHEET NOTE
03/05/23 0217   Treatment Team Notification   Reason for Communication Critical results   Type of Critical Result Laboratory   Critical Lab Result Type Troponin   Team Member Name [address], NP   Treatment Team Role Consulting Provider   Method of Communication Call   Response See orders   Notification Time [phone]

## 2023-03-05 NOTE — CONSULTS
Cardiology Consult           Date of Admission:  2/28/2023  Date of Consultation:  3/5/2023      PCP:  Prudencio Malik MD      Chief Complaint: SVT    History of Present Illness:  Britney Connor is a 60 y.o. female who presents with AMS and is undergoing extesive neurological workup. We were consulted for SVT. PMH:   has a past medical history of Cerebral artery occlusion with cerebral infarction ([facility] [address] Diabetes mellitus ([facility] [address] HTN (hypertension), Mental disability, Pulmonary emboli ([facility] [address] and SVT (supraventricular tachycardia) ([facility] [address] PSH:   has no past surgical history on file. Allergies:  No Known Allergies     Home Meds:    Prior to Admission medications    Medication Sig Start Date End Date Taking?  Authorizing Provider   dilTIAZem (CARDIZEM 12 HR) 120 MG extended release capsule Take 120 mg by mouth 2 times daily   Yes Historical Provider, MD   potassium chloride (KLOR-CON M) 20 MEQ extended release tablet Take 20 mEq by mouth daily   Yes Historical Provider, MD   Icosapent Ethyl (VASCEPA) 1 g CAPS capsule Take 2 capsules by mouth 2 times daily   Yes Historical Provider, MD   furosemide (LASIX) 40 MG tablet Take 1 tablet by mouth daily 2/10/23   Pretty Husbands, MD   methenamine (HIPREX) 1 g tablet Take 1 g by mouth 2 times daily (with meals)    Historical Provider, MD   apixaban (ELIQUIS) 5 MG TABS tablet Take 5 mg by mouth 2 times daily 1/29/21   Historical Provider, MD   atorvastatin (LIPITOR) 20 MG tablet Take 20 mg by mouth nightly 4/17/21   Historical Provider, MD   cyanocobalamin 1000 MCG tablet Take 1,000 mcg by mouth daily 4/17/21   Historical Provider, MD   fenofibrate (TRICOR) 145 MG tablet Take 145 mg by mouth daily 1/26/23   Historical Provider, MD   glimepiride (AMARYL) 4 MG tablet Take 4 mg by mouth with breakfast and with evening meal 1/26/23   Historical Provider, MD   hydrOXYzine HCl (ATARAX) 25 MG tablet Take 25 mg by mouth nightly as needed 2/4/21   Historical Provider, MD   metFORMIN (GLUCOPHAGE) 500 MG tablet Take 500 mg by mouth 2 times daily (with meals) 1/26/23   Historical Provider, MD   insulin aspart (NOVOLOG FLEXPEN) 100 UNIT/ML injection pen Inject into the skin 3 times daily (with meals) 12/3/19   Historical Provider, MD   metoprolol succinate (TOPROL XL) 50 MG extended release tablet Take 50 mg by mouth daily 1/26/23   Historical Provider, MD   MYRBETRIQ 25 MG TB24 Take 25 mg by mouth daily 1/26/23   Historical Provider, MD   QUEtiapine (SEROQUEL) 25 MG tablet Take 25 mg by mouth every evening 1/26/23   Historical Provider, MD   sertraline (ZOLOFT) 25 MG tablet Take 25 mg by mouth daily 1/26/23   Historical Provider, MD   spironolactone (ALDACTONE) 25 MG tablet Take 25 mg by mouth daily 1/26/23   Historical Provider, MD        [facility] Meds:    Current Facility-Administered Medications   Medication Dose Route Frequency Provider Last Rate Last Admin    metoprolol succinate (TOPROL XL) extended release tablet 75 mg  75 mg Oral Daily VANESSA Cronin - CNP   75 mg at 03/05/23 0820    0.9 % sodium chloride bolus  80 mL IntraVENous Once Alejandro Tinoco MD        cefTRIAXone (ROCEPHIN) 1,000 mg in sodium chloride 0.9 % 50 mL IVPB (mini-bag)  1,000 mg IntraVENous Q24H Jordin Gilbert MD   Stopped at 03/04/23 1844    heparin (porcine) injection 9,220 Units  80 Units/kg IntraVENous PRN Quentin Bustos MD        heparin (porcine) injection 4,610 Units  40 Units/kg IntraVENous PRN Jordin Gilbert MD        heparin 25,000 units in dextrose 5% 250 mL (premix) infusion  5-30 Units/kg/hr IntraVENous Continuous Quentin Bustos MD 17.3 mL/hr at 03/05/23 1427 15 Units/kg/hr at 03/05/23 1427    dexmedetomidine (PRECEDEX) 400 mcg in sodium chloride 0.9 % 100 mL infusion  0.1-1.5 mcg/kg/hr IntraVENous Continuous Alexander Paul MD   Held at 03/05/23 0308    0.9 % sodium chloride infusion   IntraVENous Continuous Jordin Gilbert MD   Stopped at 03/04/23 2214    [Held by provider] aspirin tablet 325 mg  325 mg Oral Daily Quentin Bustos MD   325 mg at 03/04/23 0954    acetaminophen (TYLENOL) tablet 650 mg  650 mg Oral Q4H PRN Quentin Bustos MD   650 mg at 03/04/23 2238    miconazole (MICOTIN) 2 % powder   Topical 4x Daily Nhan Baig MD   Given at 03/05/23 1307    gabapentin (NEURONTIN) capsule 100 mg  100 mg Oral TID Nhan Baig MD   100 mg at 03/05/23 1308    insulin lispro (HUMALOG) injection vial 0-8 Units  0-8 Units SubCUTAneous TID  Nhan Baig MD   2 Units at 03/05/23 1219    insulin lispro (HUMALOG) injection vial 0-4 Units  0-4 Units SubCUTAneous Nightly Quentin Bustos MD        glucose chewable tablet 16 g  4 tablet Oral PRN Quentin Bustos MD        dextrose bolus 10% 125 mL  125 mL IntraVENous PRN Nhan Baig MD        Or    dextrose bolus 10% 250 mL  250 mL IntraVENous PRN Nhan Baig MD        glucagon (rDNA) injection 1 mg  1 mg SubCUTAneous PRN Nhan Baig MD        dextrose 10 % infusion   IntraVENous Continuous PRN Nhan Baig MD        [Held by provider] apixaban (ELIQUIS) tablet 5 mg  5 mg Oral BID Quentin Bustos MD   5 mg at 03/04/23 0954    atorvastatin (LIPITOR) tablet 20 mg  20 mg Oral Nightly Quentin Bustos MD   20 mg at 03/04/23 2238    dilTIAZem (CARDIZEM 12 HR) extended release capsule 120 mg  120 mg Oral BID Quentin Bustos MD   120 mg at 03/05/23 0819    fenofibrate (TRICOR) tablet 54 mg  54 mg Oral Daily Quentin Bustos MD   54 mg at 03/05/23 0821    furosemide (LASIX) tablet 40 mg  40 mg Oral Daily Quentin Bustos MD   40 mg at 03/05/23 0819    glipiZIDE (GLUCOTROL) tablet 2.5 mg  2.5 mg Oral QAM AC Quentin Bustos MD   2.5 mg at 03/05/23 0998    metFORMIN (GLUCOPHAGE) tablet 500 mg  500 mg Oral BID GODFREY Baig MD   500 mg at 03/05/23 0821    QUEtiapine (SEROQUEL) tablet 25 mg  25 mg Oral QPM Nhan Baig MD   25 mg at 03/03/23 1857    sertraline (ZOLOFT) tablet 25 mg  25 mg Oral Daily Quentin Bustos MD   25 mg at 03/05/23 0820    spironolactone (ALDACTONE) tablet 25 mg  25 mg Oral Daily Quentin Bustos MD   25 mg at 03/05/23 0819    sodium chloride flush 0.9 % injection 5-40 mL  5-40 mL IntraVENous 2 times per day Amanda Palacios, APRN - CNP   10 mL at 03/05/23 3759    sodium chloride flush 0.9 % injection 10 mL  10 mL IntraVENous PRN Madeleine Burch Lump, APRN - CNP   10 mL at 03/04/23 2156    0.9 % sodium chloride infusion   IntraVENous PRN Mitra A Lump, APRN - CNP        potassium chloride (KLOR-CON M) extended release tablet 40 mEq  40 mEq Oral PRN Mitra A Lump, APRN - CNP        Or    potassium bicarb-citric acid (EFFER-K) effervescent tablet 40 mEq  40 mEq Oral PRN Mitra A Lump, APRN - CNP        Or    potassium chloride 10 mEq/100 mL IVPB (Peripheral Line)  10 mEq IntraVENous PRN Mitra A Lump, APRN - CNP        magnesium sulfate 1000 mg in dextrose 5% 100 mL IVPB  1,000 mg IntraVENous PRN Mitra A Lump, APRN - CNP        ondansetron (ZOFRAN-ODT) disintegrating tablet 4 mg  4 mg Oral Q8H PRN Mitra A Lump, APRN - CNP        Or    ondansetron (ZOFRAN) injection 4 mg  4 mg IntraVENous Q6H PRN Mitra A Lump, APRN - CNP   4 mg at 03/05/23 0701    polyethylene glycol (GLYCOLAX) packet 17 g  17 g Oral Daily PRN Mitra A Lump, APRN - CNP           Social History:       TOBACCO:   reports that she has never smoked. She has never used smokeless tobacco.  ETOH:   reports that she does not currently use alcohol. DRUGS:  reports that she does not currently use drugs. OCCUPATION:          Family Histroy:     No family history on file. Review of Systems:   Constitutional: there has been no unanticipated weight loss. There's been no change in energy level, sleep pattern, or activity level. Eyes: No visual changes or diplopia. No scleral icterus. ENT: No Headaches, hearing loss or vertigo. No mouth sores or sore throat. Cardiovascular: No chest pain, dyspnea on exertion, palpitations or loss of consciousness. No cough, hemoptysis, pleuritic pain, or phlebitis.   Respiratory: No cough or wheezing, no sputum production. No hematemesis. Gastrointestinal: No abdominal pain, appetite loss, blood in stools. No change in bowel or bladder habits. Genitourinary: No dysuria, trouble voiding, or hematuria. Musculoskeletal:  No gait disturbance, weakness or joint complaints. Integumentary: No rash or pruritis. Neurological: No headache, diplopia, change in muscle strength, numbness or tingling. No change in gait, balance, coordination, mood, affect, memory, mentation, behavior. Psychiatric: No anxiety, or depression. Endocrine: No temperature intolerance. No excessive thirst, fluid intake, or urination. No tremor. Hematologic/Lymphatic: No abnormal bruising or bleeding, blood clots or swollen lymph nodes. Allergic/Immunologic: No nasal congestion or hives. Physical Exam    Vital Signs: /64   Pulse 78   Temp 98.7 °F (37.1 °C) (Temporal)   Resp 27   Ht 5' 11\" (1.803 m)   Wt 257 lb 6.4 oz (116.8 kg)   SpO2 99%   BMI 35.90 kg/m²  O2 Flow Rate (L/min): 3 L/min     Admission Weight: 275 lb (124.7 kg)     General appearance: drowsy, not answering questions on bipap    Head: Normocephalic, without obvious abnormality, atraumatic    Eyes: Conjunctivae/corneas clear. PERRL, EOM's intact. Fundi benign    Neck: no adenopathy, difficult to assess JVD    Lungs: clear to auscultation bilaterally    Heart: regular rate and rhythm, S1, S2 normal, no murmur, click, rub or gallop    Abdomen: Soft, non-tender. Bowel sounds normal. No masses,  no organomegaly    Extremities: cool to touch, no edema. +bruising. Neurologic: unable to assess       MEDICAL DECISION MAKING/TESTING    Cardiac Cath:  normal coronaries in 2018    Echo/Stress:  Mild to moderate left ventricular hypertrophy  Global left ventricular systolic function is normal  Estimated ejection fraction is 65 % . Mitral annular calcification is seen. Mean gradient is 9 mmHg. Mild mitral stenosis.  However, the mitral valve  appears to open well. No pericardial effusion seen. Normal aortic root dimension. Labs:      CBC:   Recent Labs     03/03/23  0637 03/04/23  2306 03/05/23  1150   WBC 14.4* 18.9* 12.4*   HGB 11.7* 12.0 10.3*   HCT 36.3 36.6 32.4*   MCV 83.1 86.3 85.5   * 202 130*     BMP:   Recent Labs     03/03/23  0637 03/04/23  0625 03/05/23  1150   * 135 131*   K 4.0 4.4 3.7    102 100   CO2 21 22 20   BUN 27* 34* 28*   CREATININE 0.89 1.04* 0.88     PT/INR: No results for input(s): PROTIME, INR in the last 72 hours. APTT:   Recent Labs     03/04/23  1913 03/05/23  1150   APTT 36.5* >150.0*     MAG: No results for input(s): MG in the last 72 hours. D Dimer: No results for input(s): DDIMER in the last 72 hours. Troponin T No results for input(s): TROPONINT in the last 72 hours. ProBNP Invalid input(s): PRO-BNP          Diagnosis:  Principal Problem:    Weakness  Resolved Problems:    * No resolved hospital problems. *        Plan:  30-year-old female with a past medical history significant for developmental delay, DM type 2, hyperlipidemia, HFpEF with moderate mitral stenosis with mean gradient of 9 mmHg with HR in the 70s BPM, recurrent strokes and obesity here with AMS. We were consulted for SVT. Telemetry consistent with SVT most likely AVNRT/AVRT. BB was uptitrated and has been in sinus today. She had an SVT during her most recent hospitalization at [facility], at which time we had discontinued her flecainide prior to discharge as well as metop and continued dilt. SVT thought to be aggravated by PE at the time. For now, would continue with current regimen. Once she has recovered we will arrange for follow up with EP for potential ablation versus continuing medical management. Of not, she has an extensive workup in the past for cardio embolic source for splenic infarct at which time she did not have any arrhythmias.  Workup at the time including TTE with bubble, in house tele and one month event monitor. Continue telemetry monitoring.    Keep K>4 and Mg>2

## 2023-03-05 NOTE — PROGRESS NOTES
Message sent to Dr Sweta Tovar and Dr Manuel Olszewski asking for a central line after multiple attempts failed to obtain any blood for labs. Awaiting response from either physician.

## 2023-03-05 NOTE — FLOWSHEET NOTE
PICC line placed bedside using tip confirmation system. OK to use PICC line per hospital policy.  Pt on Heparin drip oozing at insertion site noted dressing reinforced

## 2023-03-05 NOTE — PROGRESS NOTES
End Of Shift Note  [address] CVICU  Summary of shift: Pt received an extra dose of Toprol at 0217. Pt noted to convert to sinus rhythm at 0516 (see rhythm strip in pt chart).  Pt continues to be on Heparin drip at 18 units/kg/hr however pt's PTT that was due at 0415 was not able to be drawn by 3 different phlebotomists and the respiratory therapist attempted as well to obtain following the ABG draw in the L brachial.    Vitals:    Vitals:    03/05/23 0400 03/05/23 0549 03/05/23 0556 03/05/23 0611   BP: 99/72      Pulse: (!) 122  81 87   Resp: 24 24 24 23   Temp: 97.2 °F (36.2 °C)      TempSrc: Temporal      SpO2: 100%  100% 96%   Weight: 257 lb 6.4 oz (116.8 kg)      Height:            I&O:   Intake/Output Summary (Last 24 hours) at 3/5/2023 0634  Last data filed at 3/5/2023 0500  Gross per 24 hour   Intake 10 ml   Output 350 ml   Net -340 ml       Resp Status: Pt wore bipap most of night following transfer from [facility] until 0615 when she was placed on 2L NC    Ventilator Settings:     / / /FiO2 : 35 %    Critical Care IV infusions:   heparin (PORCINE) Infusion 18 Units/kg/hr (03/04/23 2213)    dexmedetomidine (PRECEDEX) IV infusion Stopped (03/05/23 0308)    sodium chloride 125 mL/hr at 03/04/23 1813    dextrose      sodium chloride          LDA:   Peripheral IV 02/28/23 Right Antecubital (Active)   Number of days: 4       Peripheral IV 03/04/23 Left Forearm (Active)   Number of days: 0       External Urinary Catheter (Active)   Number of days: 4

## 2023-03-05 NOTE — FLOWSHEET NOTE
03/04/23 2337   Treatment Team Notification   Reason for Communication Review case   Team Member Name Mandy Rowell MD   Treatment Team Role Consulting Provider   Method of Communication Call   Response Waiting for response   Notification Time [phone]

## 2023-03-05 NOTE — CARE COORDINATION
Social work; confirmed with Michell GARCIA) that pt is accepted at discharge. No precert is needed per [place] on call person. Will need mana, Rx and discharge order for snf.  Jory krishnan

## 2023-03-05 NOTE — FLOWSHEET NOTE
03/05/23 0113   Treatment Team Notification   Reason for Communication Evaluate  (Dr Mar Cassidy indicated she would place orders once she got signed into [employer]   Team Member Name Oscar Wyman MD   Treatment Team Role Consulting Provider   Method of Communication Call   Response Waiting for response   Notification Time [id number]

## 2023-03-05 NOTE — PLAN OF CARE
Problem: Discharge Planning  Goal: Discharge to home or other facility with appropriate resources  3/5/2023 0117 by Kelly Ruffin RN  Outcome: Progressing  Flowsheets (Taken 3/4/2023 2300 by Dulce Kulkarni RN)  Discharge to home or other facility with appropriate resources:   Identify barriers to discharge with patient and caregiver   Arrange for needed discharge resources and transportation as appropriate   Identify discharge learning needs (meds, wound care, etc)  3/4/2023 1538 by Pardeep Gonzalez RN  Note: Progressing  3/4/2023 1534 by Pardeep Gonzalez RN  Outcome: Progressing  Note: Progressing     Problem: Pain  Goal: Verbalizes/displays adequate comfort level or baseline comfort level  3/5/2023 0117 by Kelly Ruffin RN  Outcome: Progressing  Flowsheets (Taken 3/4/2023 2300 by Dulce Kulkarni RN)  Verbalizes/displays adequate comfort level or baseline comfort level:   Encourage patient to monitor pain and request assistance   Assess pain using appropriate pain scale   Implement non-pharmacological measures as appropriate and evaluate response  3/4/2023 1538 by Pardeep Gonzalez RN  Outcome: Progressing  Note: Progressing  3/4/2023 1534 by Pradeep Gonzalez RN  Outcome: Progressing  Flowsheets (Taken 3/4/2023 1534)  Verbalizes/displays adequate comfort level or baseline comfort level:   Encourage patient to monitor pain and request assistance   Assess pain using appropriate pain scale   Administer analgesics based on type and severity of pain and evaluate response  Note: Progressing     Problem: Confusion  Goal: Confusion, delirium, dementia, or psychosis is improved or at baseline  Description: INTERVENTIONS:  1. Assess for possible contributors to thought disturbance, including medications, impaired vision or hearing, underlying metabolic abnormalities, dehydration, psychiatric diagnoses, and notify attending LIP  2. [place] high risk fall precautions, as indicated  3.  Provide frequent short contacts to provide reality reorientation, refocusing and direction  4. Decrease environmental stimuli, including noise as appropriate  5. Monitor and intervene to maintain adequate nutrition, hydration, elimination, sleep and activity  6. If unable to ensure safety without constant attention obtain sitter and review sitter guidelines with assigned personnel  7. Initiate Psychosocial CNS and Spiritual Care consult, as indicated  3/5/2023 0117 by Brennan Egan RN  Outcome: Progressing  3/4/2023 1534 by Victoriano Stewart RN  Outcome: Progressing  Note: Progressing     Problem: Skin/Tissue Integrity  Goal: Absence of new skin breakdown  Description: 1. Monitor for areas of redness and/or skin breakdown  2. Assess vascular access sites hourly  3. Every 4-6 hours minimum:  Change oxygen saturation probe site  4. Every 4-6 hours:  If on nasal continuous positive airway pressure, respiratory therapy assess nares and determine need for appliance change or resting period.   3/5/2023 0117 by Brennan Egan RN  Outcome: Progressing  3/4/2023 1538 by Victoriano Stewart RN  Outcome: Progressing  Note: Progressing  3/4/2023 1534 by Victoriano Stewart RN  Outcome: Progressing  Note: Progressing     Problem: Safety - Adult  Goal: Free from fall injury  3/5/2023 0117 by Brennan Egan RN  Outcome: Progressing  3/4/2023 1538 by Victoriano Stewart RN  Outcome: Progressing  Note: Progressing  3/4/2023 1534 by Victoriano Stewart RN  Outcome: Progressing  Note: Progressing       Problem: Chronic Conditions and Co-morbidities  Goal: Patient's chronic conditions and co-morbidity symptoms are monitored and maintained or improved  3/5/2023 0117 by Brennan Egan RN  Outcome: Progressing  Flowsheets (Taken 3/4/2023 2300 by Neva Almazan RN)  Care Plan - Patient's Chronic Conditions and Co-Morbidity Symptoms are Monitored and Maintained or Improved:   Monitor and assess patient's chronic conditions and comorbid symptoms for stability, deterioration, or improvement   Collaborate with multidisciplinary team to address chronic and comorbid conditions and prevent exacerbation or deterioration  3/4/2023 1534 by Miesha Lai RN  Outcome: Progressing  Note: Progressing       Problem: Neurosensory - Adult  Goal: Achieves stable or improved neurological status  3/5/2023 0117 by Guerline Smith RN  Outcome: Progressing  Flowsheets (Taken 3/4/2023 2300 by Ashley Boland RN)  Achieves stable or improved neurological status:   Assess for and report changes in neurological status   Initiate measures to prevent increased intracranial pressure   Maintain blood pressure and fluid volume within ordered parameters to optimize cerebral perfusion and minimize risk of hemorrhage  3/4/2023 1538 by Miesha Lai RN  Outcome: Progressing  Flowsheets (Taken 3/4/2023 1538)  Achieves stable or improved neurological status: Assess for and report changes in neurological status  Note: Progressing     Problem: Musculoskeletal - Adult  Goal: Return mobility to safest level of function  3/5/2023 0117 by Guerline Smith RN  Outcome: Progressing  Flowsheets (Taken 3/4/2023 2300 by Ashley Boland RN)  Return Mobility to Safest Level of Function:   Ensure adequate protection for wounds/incisions during mobilization   Obtain physical therapy/occupational therapy consults as needed   Apply continuous passive motion per provider or physical therapy orders to increase flexion toward goal  3/4/2023 1538 by Miesha Lai RN  Outcome: Progressing  Flowsheets (Taken 3/4/2023 1538)  Return Mobility to Safest Level of Function: Assess patient stability and activity tolerance for standing, transferring and ambulating with or without assistive devices  Note: Progressing

## 2023-03-06 ENCOUNTER — APPOINTMENT (OUTPATIENT)
Dept: GENERAL RADIOLOGY | Age: 63
DRG: 064 | End: 2023-03-06
Payer: MEDICARE

## 2023-03-06 ENCOUNTER — APPOINTMENT (OUTPATIENT)
Dept: INTERVENTIONAL RADIOLOGY/VASCULAR | Age: 63
DRG: 064 | End: 2023-03-06
Payer: MEDICARE

## 2023-03-06 LAB
ABSOLUTE EOS #: <0.03 K/UL (ref 0–0.44)
ABSOLUTE IMMATURE GRANULOCYTE: 0.11 K/UL (ref 0–0.3)
ABSOLUTE LYMPH #: 1.48 K/UL (ref 1.1–3.7)
ABSOLUTE MONO #: 0.79 K/UL (ref 0.1–1.2)
ANA SER QL IA: NEGATIVE
ANION GAP SERPL CALCULATED.3IONS-SCNC: 8 MMOL/L (ref 9–17)
BASOPHILS # BLD: 0 % (ref 0–2)
BASOPHILS ABSOLUTE: <0.03 K/UL (ref 0–0.2)
BUN SERPL-MCNC: 28 MG/DL (ref 8–23)
BUN/CREAT BLD: 33 (ref 9–20)
CALCIUM SERPL-MCNC: 7.6 MG/DL (ref 8.6–10.4)
CHLORIDE SERPL-SCNC: 101 MMOL/L (ref 98–107)
CO2 SERPL-SCNC: 21 MMOL/L (ref 20–31)
CREAT SERPL-MCNC: 0.85 MG/DL (ref 0.5–0.9)
CRP SERPL HS-MCNC: 58.7 MG/L (ref 0–5)
DSDNA IGG SER QL IA: 5.4 IU/ML
EKG Q-T INTERVAL: 302 MS
EKG QRS DURATION: 94 MS
EKG QTC CALCULATION (BAZETT): 475 MS
EKG R AXIS: -65 DEGREES
EKG T AXIS: 88 DEGREES
EKG VENTRICULAR RATE: 149 BPM
EOSINOPHILS RELATIVE PERCENT: 0 % (ref 1–4)
ERYTHROCYTE [SEDIMENTATION RATE] IN BLOOD BY WESTERGREN METHOD: 77 MM/HR (ref 0–30)
EST. AVERAGE GLUCOSE BLD GHB EST-MCNC: 177 MG/DL
GFR SERPL CREATININE-BSD FRML MDRD: >60 ML/MIN/1.73M2
GLUCOSE BLD-MCNC: 164 MG/DL (ref 65–105)
GLUCOSE BLD-MCNC: 190 MG/DL (ref 65–105)
GLUCOSE BLD-MCNC: 227 MG/DL (ref 65–105)
GLUCOSE BLD-MCNC: 238 MG/DL (ref 65–105)
GLUCOSE BLD-MCNC: 242 MG/DL (ref 65–105)
GLUCOSE SERPL-MCNC: 168 MG/DL (ref 70–99)
HBA1C MFR BLD: 7.8 % (ref 4–6)
HCT VFR BLD AUTO: 30.2 % (ref 36.3–47.1)
HGB BLD-MCNC: 9.5 G/DL (ref 11.9–15.1)
IMMATURE GRANULOCYTES: 1 %
LYMPHOCYTES # BLD: 12 % (ref 24–43)
MAGNESIUM SERPL-MCNC: 1.6 MG/DL (ref 1.6–2.6)
MCH RBC QN AUTO: 27 PG (ref 25.2–33.5)
MCHC RBC AUTO-ENTMCNC: 31.5 G/DL (ref 28.4–34.8)
MCV RBC AUTO: 85.8 FL (ref 82.6–102.9)
MONOCYTES # BLD: 6 % (ref 3–12)
MYOGLOBIN SERPL-MCNC: 25 NG/ML (ref 25–58)
MYOGLOBIN SERPL-MCNC: 27 NG/ML (ref 25–58)
NRBC AUTOMATED: 0 PER 100 WBC
NUCLEAR IGG SER IA-RTO: 0.2 U/ML
PDW BLD-RTO: 14.9 % (ref 11.8–14.4)
PLATELET # BLD AUTO: 108 K/UL (ref 138–453)
PMV BLD AUTO: 11.3 FL (ref 8.1–13.5)
POTASSIUM SERPL-SCNC: 3.5 MMOL/L (ref 3.7–5.3)
RBC # BLD: 3.52 M/UL (ref 3.95–5.11)
RBC # BLD: ABNORMAL 10*6/UL
SEG NEUTROPHILS: 81 % (ref 36–65)
SEGMENTED NEUTROPHILS ABSOLUTE COUNT: 10.02 K/UL (ref 1.5–8.1)
SODIUM SERPL-SCNC: 130 MMOL/L (ref 135–144)
TROPONIN I SERPL DL<=0.01 NG/ML-MCNC: 197 NG/L (ref 0–14)
TROPONIN I SERPL DL<=0.01 NG/ML-MCNC: 201 NG/L (ref 0–14)
TROPONIN I SERPL DL<=0.01 NG/ML-MCNC: 203 NG/L (ref 0–14)
TROPONIN I SERPL DL<=0.01 NG/ML-MCNC: 214 NG/L (ref 0–14)
WBC # BLD AUTO: 12.4 K/UL (ref 3.5–11.3)

## 2023-03-06 PROCEDURE — C1751 CATH, INF, PER/CENT/MIDLINE: HCPCS

## 2023-03-06 PROCEDURE — 82947 ASSAY GLUCOSE BLOOD QUANT: CPT

## 2023-03-06 PROCEDURE — 83036 HEMOGLOBIN GLYCOSYLATED A1C: CPT

## 2023-03-06 PROCEDURE — 99233 SBSQ HOSP IP/OBS HIGH 50: CPT | Performed by: PSYCHIATRY & NEUROLOGY

## 2023-03-06 PROCEDURE — 6370000000 HC RX 637 (ALT 250 FOR IP): Performed by: FAMILY MEDICINE

## 2023-03-06 PROCEDURE — 2700000000 HC OXYGEN THERAPY PER DAY

## 2023-03-06 PROCEDURE — 71045 X-RAY EXAM CHEST 1 VIEW: CPT

## 2023-03-06 PROCEDURE — 6360000002 HC RX W HCPCS: Performed by: HOSPITALIST

## 2023-03-06 PROCEDURE — 85730 THROMBOPLASTIN TIME PARTIAL: CPT

## 2023-03-06 PROCEDURE — 86140 C-REACTIVE PROTEIN: CPT

## 2023-03-06 PROCEDURE — 2580000003 HC RX 258: Performed by: FAMILY MEDICINE

## 2023-03-06 PROCEDURE — 6370000000 HC RX 637 (ALT 250 FOR IP): Performed by: NURSE PRACTITIONER

## 2023-03-06 PROCEDURE — 2060000000 HC ICU INTERMEDIATE R&B

## 2023-03-06 PROCEDURE — 83735 ASSAY OF MAGNESIUM: CPT

## 2023-03-06 PROCEDURE — 80048 BASIC METABOLIC PNL TOTAL CA: CPT

## 2023-03-06 PROCEDURE — 6360000002 HC RX W HCPCS: Performed by: FAMILY MEDICINE

## 2023-03-06 PROCEDURE — 83874 ASSAY OF MYOGLOBIN: CPT

## 2023-03-06 PROCEDURE — 6360000002 HC RX W HCPCS: Performed by: NURSE PRACTITIONER

## 2023-03-06 PROCEDURE — 94761 N-INVAS EAR/PLS OXIMETRY MLT: CPT

## 2023-03-06 PROCEDURE — 84484 ASSAY OF TROPONIN QUANT: CPT

## 2023-03-06 PROCEDURE — 85025 COMPLETE CBC W/AUTO DIFF WBC: CPT

## 2023-03-06 PROCEDURE — 85652 RBC SED RATE AUTOMATED: CPT

## 2023-03-06 PROCEDURE — 94660 CPAP INITIATION&MGMT: CPT

## 2023-03-06 RX ORDER — HEPARIN SODIUM 10000 [USP'U]/100ML
5-30 INJECTION, SOLUTION INTRAVENOUS CONTINUOUS
Status: DISPENSED | OUTPATIENT
Start: 2023-03-06 | End: 2023-03-07

## 2023-03-06 RX ADMIN — ACETAMINOPHEN 650 MG: 325 TABLET ORAL at 18:41

## 2023-03-06 RX ADMIN — METOPROLOL SUCCINATE 75 MG: 50 TABLET, EXTENDED RELEASE ORAL at 09:38

## 2023-03-06 RX ADMIN — SODIUM CHLORIDE: 9 INJECTION, SOLUTION INTRAVENOUS at 09:47

## 2023-03-06 RX ADMIN — GABAPENTIN 100 MG: 100 CAPSULE ORAL at 13:02

## 2023-03-06 RX ADMIN — ACETAMINOPHEN 650 MG: 325 TABLET ORAL at 13:41

## 2023-03-06 RX ADMIN — HEPARIN SODIUM AND DEXTROSE 12 UNITS/KG/HR: 10000; 5 INJECTION INTRAVENOUS at 16:46

## 2023-03-06 RX ADMIN — INSULIN LISPRO 2 UNITS: 100 INJECTION, SOLUTION INTRAVENOUS; SUBCUTANEOUS at 13:01

## 2023-03-06 RX ADMIN — DILTIAZEM HYDROCHLORIDE 120 MG: 60 CAPSULE, EXTENDED RELEASE ORAL at 09:38

## 2023-03-06 RX ADMIN — ANTI-FUNGAL POWDER MICONAZOLE NITRATE TALC FREE: 1.42 POWDER TOPICAL at 09:00

## 2023-03-06 RX ADMIN — ANTI-FUNGAL POWDER MICONAZOLE NITRATE TALC FREE: 1.42 POWDER TOPICAL at 18:41

## 2023-03-06 RX ADMIN — METFORMIN HYDROCHLORIDE 500 MG: 500 TABLET, FILM COATED ORAL at 09:39

## 2023-03-06 RX ADMIN — ANTI-FUNGAL POWDER MICONAZOLE NITRATE TALC FREE: 1.42 POWDER TOPICAL at 01:16

## 2023-03-06 RX ADMIN — MAGNESIUM SULFATE HEPTAHYDRATE 1000 MG: 1 INJECTION, SOLUTION INTRAVENOUS at 08:32

## 2023-03-06 RX ADMIN — SPIRONOLACTONE 25 MG: 25 TABLET ORAL at 09:39

## 2023-03-06 RX ADMIN — ANTI-FUNGAL POWDER MICONAZOLE NITRATE TALC FREE: 1.42 POWDER TOPICAL at 20:55

## 2023-03-06 RX ADMIN — MAGNESIUM SULFATE HEPTAHYDRATE 1000 MG: 1 INJECTION, SOLUTION INTRAVENOUS at 06:22

## 2023-03-06 RX ADMIN — ATORVASTATIN CALCIUM 20 MG: 20 TABLET, FILM COATED ORAL at 20:55

## 2023-03-06 RX ADMIN — ACETAMINOPHEN 650 MG: 325 TABLET ORAL at 09:39

## 2023-03-06 RX ADMIN — INSULIN LISPRO 2 UNITS: 100 INJECTION, SOLUTION INTRAVENOUS; SUBCUTANEOUS at 18:40

## 2023-03-06 RX ADMIN — DILTIAZEM HYDROCHLORIDE 120 MG: 60 CAPSULE, EXTENDED RELEASE ORAL at 20:55

## 2023-03-06 RX ADMIN — FUROSEMIDE 40 MG: 40 TABLET ORAL at 09:39

## 2023-03-06 RX ADMIN — GLIPIZIDE 2.5 MG: 5 TABLET ORAL at 06:26

## 2023-03-06 RX ADMIN — CEFTRIAXONE SODIUM 1000 MG: 1 INJECTION, POWDER, FOR SOLUTION INTRAMUSCULAR; INTRAVENOUS at 18:32

## 2023-03-06 RX ADMIN — FENOFIBRATE 54 MG: 54 TABLET ORAL at 09:39

## 2023-03-06 RX ADMIN — QUETIAPINE FUMARATE 25 MG: 25 TABLET ORAL at 18:41

## 2023-03-06 RX ADMIN — GABAPENTIN 100 MG: 100 CAPSULE ORAL at 09:39

## 2023-03-06 RX ADMIN — SERTRALINE 25 MG: 25 TABLET, FILM COATED ORAL at 09:38

## 2023-03-06 RX ADMIN — ANTI-FUNGAL POWDER MICONAZOLE NITRATE TALC FREE: 1.42 POWDER TOPICAL at 13:02

## 2023-03-06 RX ADMIN — GABAPENTIN 100 MG: 100 CAPSULE ORAL at 20:55

## 2023-03-06 RX ADMIN — METFORMIN HYDROCHLORIDE 500 MG: 500 TABLET, FILM COATED ORAL at 18:41

## 2023-03-06 ASSESSMENT — PAIN DESCRIPTION - DESCRIPTORS: DESCRIPTORS: ACHING

## 2023-03-06 ASSESSMENT — PAIN DESCRIPTION - ORIENTATION: ORIENTATION: MID

## 2023-03-06 ASSESSMENT — PAIN DESCRIPTION - LOCATION: LOCATION: BUTTOCKS

## 2023-03-06 ASSESSMENT — PAIN SCALES - GENERAL: PAINLEVEL_OUTOF10: 3

## 2023-03-06 NOTE — PROGRESS NOTES
[facility]    Adult Hospitalist      Name: Diamond Pemberton  MRN: [id number]     Acct: [de-identified]  Room: 2029/2029-01    Admit Date: 2/28/2023  5:51 PM  PCP: Miles Cervantes MD    Primary Problem  Principal Problem:    Weakness  Resolved Problems:    * No resolved hospital problems. *        Assesment:     Unable to ambulate  Weakness  Atrial fibrillation with controlled rate  Chronic diastolic congestive heart failure  Mild mitral stenosis  Moderate left ventricular hypertrophy on echo 2/6/2023  Cognitive impairment/ developmental delay   Essential hypertension  Diabetes mellitus type 2  Hyperlipidemia  Urgent continence  Morbid obesity with BMI 38.3  Fever  Headache  Acute microinfarcts caudate head and right thalamus   SVT likely sec to recent PE  Acute hypoxic respiratory failure   Pulmonary edema         Plan:     Admit to [facility]  Monitor vitals closely  Keep SPO2 above 90%  I's and O's  IV  Pain control  Antiemetics as needed  Reviewed chart and paperwork  Discussed with medical staff  PT/OT  Consult social work  Patient requires physical rehabilitation. Apparently her POA refused SNF during her recent admission to the [address] [place] [address] Dr before meals and at bedtime  Insulin sliding scale, medium  Hypoglycemia protocol  Resume essential home medication  Add parameters  CBC, BMP  Gabapentin  X-ray lumbar  Consult neurology  Plan discharge to skilled nursing facility tomorrow-hold  Aspirin  Neurology plan lumbar puncture to rule out vasculitis on Monday  Hold aspirin and Eliquis on 3/4/2023  Plan LP on 3/6  Bridge w Heparin infusion for 6 hours before procedure   D/w POA in detail  Rocephin IV   Consult Critical care  Consult Cardiology   Recently Cardiology DC'd Flecainide and Metoprolol and started Diltiazem   DVT and GI prophylaxis. Chief Complaint:     Chief Complaint   Patient presents with    Fatigue     Unable to walk or stand.  Discharged from [facility] today         History of Present Illness:        Patient seen and examined at bedside  Last 24-hour events reviewed with nursing  Pt developed acute respiratory failure w hypoxia  Also became tachycardiac  12 lead KG stat  Showed SVT  Cardiology consulted who wanted same meds right now    Pt had recent PE  Has been on Eliquis  Was held yesterday night since needs LP on 3/6  Was started on Heparin to bridge in the mean time    Now on BiPAP  Hypoxia improved  Mentation altered  Demes any headache now     MRI brain showed micro emboli  Patient denies chest pain, dyspnea   Denies vomiting or diarrhea  Denies abdominal pain          Initial HPI  Cherrie Houston is a 60 y.o.  female who presents with Fatigue (Unable to walk or stand. Discharged from [facility] today)    Admitted to the emergency room presented with family with complaints of generalized weakness. Patient speaks routinely in 1-2 word sentences and has not been able to provide any significant history herself. Most of the information has been obtained from her family members and POA through the emergency room personnel and nursing staff. Has not been able to ambulate with assistance of PT either. She requires at least 2 assist to transfer from bed to chair. Patient is not in any apparent distress. She has not complained of any chest pain, headache, vision change, abdominal pain, dysuria, diarrhea or joint swelling    I have personally reviewed the past medical history, past surgical history, medications, social history, and family history, and summarized in the note. Review of Systems:     All 10 point system is reviewed and negative otherwise mentioned in HPI.       Past Medical History:     Past Medical History:   Diagnosis Date    Cerebral artery occlusion with cerebral infarction ([address]     Diabetes mellitus ([address]     HTN (hypertension)     Mental disability     Pulmonary emboli (HCC)     SVT (supraventricular tachycardia) ([address]         Past Surgical History:     No past surgical history on file. Medications Prior to Admission:       Prior to Admission medications    Medication Sig Start Date End Date Taking?  Authorizing Provider   dilTIAZem (CARDIZEM 12 HR) 120 MG extended release capsule Take 120 mg by mouth 2 times daily   Yes Historical Provider, MD   potassium chloride (KLOR-CON M) 20 MEQ extended release tablet Take 20 mEq by mouth daily   Yes Historical Provider, MD   Icosapent Ethyl (VASCEPA) 1 g CAPS capsule Take 2 capsules by mouth 2 times daily   Yes Historical Provider, MD   furosemide (LASIX) 40 MG tablet Take 1 tablet by mouth daily 2/10/23   Krystle Mata MD   methenamine (HIPREX) 1 g tablet Take 1 g by mouth 2 times daily (with meals)    Historical Provider, MD   apixaban (ELIQUIS) 5 MG TABS tablet Take 5 mg by mouth 2 times daily 1/29/21   Historical Provider, MD   atorvastatin (LIPITOR) 20 MG tablet Take 20 mg by mouth nightly 4/17/21   Historical Provider, MD   cyanocobalamin 1000 MCG tablet Take 1,000 mcg by mouth daily 4/17/21   Historical Provider, MD   fenofibrate (TRICOR) 145 MG tablet Take 145 mg by mouth daily 1/26/23   Historical Provider, MD   glimepiride (AMARYL) 4 MG tablet Take 4 mg by mouth with breakfast and with evening meal 1/26/23   Historical Provider, MD   hydrOXYzine HCl (ATARAX) 25 MG tablet Take 25 mg by mouth nightly as needed 2/4/21   Historical Provider, MD   metFORMIN (GLUCOPHAGE) 500 MG tablet Take 500 mg by mouth 2 times daily (with meals) 1/26/23   Historical Provider, MD   insulin aspart (NOVOLOG FLEXPEN) 100 UNIT/ML injection pen Inject into the skin 3 times daily (with meals) 12/3/19   Historical Provider, MD   metoprolol succinate (TOPROL XL) 50 MG extended release tablet Take 50 mg by mouth daily 1/26/23   Historical Provider, MD   MYRBETRIQ 25 MG TB24 Take 25 mg by mouth daily 1/26/23   Historical Provider, MD   QUEtiapine (SEROQUEL) 25 MG tablet Take 25 mg by mouth every evening 1/26/23   Historical Provider, MD   sertraline (ZOLOFT) 25 MG tablet Take 25 mg by mouth daily 23   Historical Provider, MD   spironolactone (ALDACTONE) 25 MG tablet Take 25 mg by mouth daily 23   Historical Provider, MD        Allergies:       Patient has no known allergies. Social History:     Tobacco:    reports that she has never smoked. She has never used smokeless tobacco.  Alcohol:      reports that she does not currently use alcohol. Drug Use:  reports that she does not currently use drugs. Family History:     No family history on file.       Physical Exam:     Vitals:  /68   Pulse 73   Temp 97.2 °F (36.2 °C) (Temporal)   Resp 24   Ht 5' 11\" (1.803 m)   Wt 257 lb 6.4 oz (116.8 kg)   SpO2 98%   BMI 35.90 kg/m²   Temp (24hrs), Av.6 °F (36.4 °C), Min:97 °F (36.1 °C), Max:98.7 °F (37.1 °C)          General appearance - lethargic, and in mild acute distress, obese  Mental status -lethargic  Head - normocephalic and atraumatic  Eyes - pupils equal and reactive though dilated, extraocular eye movements intact, conjunctiva clear  Ears - hearing appears to be intact  Nose - no drainage noted  Mouth - mucous membranes moist  Neck - supple, no carotid bruits, thyroid not palpable  Chest -reduced breath sounds bilateral, clear to auscultation, inc effort  Heart - normal rate, regular rhythm, no murmur  Abdomen - soft, obese, nontender, nondistended, bowel sounds present all four quadrants, no masses, hepatomegaly or splenomegaly  Neurological -lethargic  Extremities - no pedal edema or calf pain with palpation  Skin - no gross lesions, rashes, or induration noted        Data:     Labs:    Hematology:  Recent Labs     23  0637 23  1834 23  2306 23  1150   WBC 14.4*  --  18.9* 12.4*   RBC 4.37  --  4.24 3.79*   HGB 11.7*  --  12.0 10.3*   HCT 36.3  --  36.6 32.4*   MCV 83.1  --  86.3 85.5   MCH 26.8  --  28.3 27.2   MCHC 32.2  --  32.8 31.8   RDW 14.6*  --  15.2* 15.0*   *  --  202 130*   MPV 10.7  --  11.2 11.3 CRP  --  153.1*  --   --        Chemistry:  Recent Labs     03/03/23  0637 03/04/23  0625 03/04/23  2217 03/05/23  0028 03/05/23  1150   * 135  --   --  131*   K 4.0 4.4  --   --  3.7    102  --   --  100   CO2 21 22  --   --  20   GLUCOSE 207* 125*  --   --  249*   BUN 27* 34*  --   --  28*   CREATININE 0.89 1.04*  --   --  0.88   ANIONGAP 11 11  --   --  11   LABGLOM >60 >60  --   --  >60   CALCIUM 8.7 8.5*  --   --  7.9*   TROPHS  --   --  155* 199* 146*   MYOGLOBIN  --   --  38 38 24*       Recent Labs     03/03/23  0637 03/03/23  0837 03/04/23  1557 03/04/23  2030 03/04/23  2345 03/05/23  0659 03/05/23  1155 03/05/23  2019   LABA1C 7.7*  --   --   --   --   --   --   --    POCGLU  --    < > 238* 209* 230* 230* 227* 208*    < > = values in this interval not displayed. Lab Results   Component Value Date    INR 1.3 03/01/2023    INR 1.7 02/05/2023    INR 1.1 08/24/2012    PROTIME 16.2 (H) 03/01/2023    PROTIME 19.9 (H) 02/05/2023    PROTIME 11.4 08/24/2012       Lab Results   Component Value Date/Time    SPECIAL NOT REPORTED 08/29/2012 06:42 PM     Lab Results   Component Value Date/Time    CULTURE NO GROWTH <24 HRS 03/05/2023 12:27 AM       Lab Results   Component Value Date/Time    POCPH 7.505 03/05/2023 06:08 AM    POCPCO2 22.3 03/05/2023 06:08 AM    POCPO2 117.3 03/05/2023 06:08 AM    POCHCO3 17.6 03/05/2023 06:08 AM    NBEA 4 03/05/2023 06:08 AM    SMOA9ISW 99 03/05/2023 06:08 AM    FIO2 35.0 03/05/2023 06:08 AM       Radiology:    No results found.       All radiological studies reviewed                Code Status:  Full Code    Electronically signed by María Elena Stafford MD on 3/5/2023 at 8:53 PM     Copy sent to Dr. James Chao MD    This note was created with the assistance of a speech-recognition program.  Although the intention is to generate a document that actually reflects the content of the visit, no guarantees can be provided that every mistake has been identified and corrected by editing. Note was updated later by me after  physical examination and  completion of the assessment.

## 2023-03-06 NOTE — PLAN OF CARE
Problem: Discharge Planning  Goal: Discharge to home or other facility with appropriate resources  Outcome: Progressing     Problem: Pain  Goal: Verbalizes/displays adequate comfort level or baseline comfort level  Outcome: Progressing     Problem: Confusion  Goal: Confusion, delirium, dementia, or psychosis is improved or at baseline  Description: INTERVENTIONS:  1. Assess for possible contributors to thought disturbance, including medications, impaired vision or hearing, underlying metabolic abnormalities, dehydration, psychiatric diagnoses, and notify attending LIP  2. [place] high risk fall precautions, as indicated  3. Provide frequent short contacts to provide reality reorientation, refocusing and direction  4. Decrease environmental stimuli, including noise as appropriate  5. Monitor and intervene to maintain adequate nutrition, hydration, elimination, sleep and activity  6. If unable to ensure safety without constant attention obtain sitter and review sitter guidelines with assigned personnel  7. Initiate Psychosocial CNS and Spiritual Care consult, as indicated  Outcome: Progressing     Problem: Skin/Tissue Integrity  Goal: Absence of new skin breakdown  Description: 1. Monitor for areas of redness and/or skin breakdown  2. Assess vascular access sites hourly  3. Every 4-6 hours minimum:  Change oxygen saturation probe site  4. Every 4-6 hours:  If on nasal continuous positive airway pressure, respiratory therapy assess nares and determine need for appliance change or resting period.   Outcome: Progressing     Problem: Safety - Adult  Goal: Free from fall injury  Outcome: Progressing     Problem: Chronic Conditions and Co-morbidities  Goal: Patient's chronic conditions and co-morbidity symptoms are monitored and maintained or improved  Outcome: Progressing     Problem: Neurosensory - Adult  Goal: Achieves stable or improved neurological status  Outcome: Progressing     Problem: Musculoskeletal - Adult  Goal: Return mobility to safest level of function  Outcome: Progressing     Problem: Respiratory - Adult  Goal: Achieves optimal ventilation and oxygenation  Outcome: Progressing

## 2023-03-06 NOTE — CARE COORDINATION
Discharge planning    MRI consistent with multiinfarct's. LP to be done tomorrow. Patient can return to [facility] when medically stable. Continue to follow.

## 2023-03-06 NOTE — FLOWSHEET NOTE
03/06/23 0311   Treatment Team Notification   Reason for Communication Critical results   Type of Critical Result Laboratory   Critical Lab Result Type Troponin   Team Member Name Candace aLy   Treatment Team Role Consulting Provider   Method of Communication Call   Response See orders     RN reported critical troponin to Candace Lay NP cardiology  No need to notify of critical troponin's unless >500 per NP. Nursing communication order put in.   New orders to d/c the troponin/myoglobin Q2 hours for 3 days and instead order troponin Q3 hours for 3 occurrences

## 2023-03-06 NOTE — PROGRESS NOTES
Pulmonary Critical Care Progress Note  Anastacio Babcock MD     Patient seen for the follow up of  Weakness     Subjective:  She had uneventful night. She is resting comfortably in bed no distress at this time. She is on supplemental oxygen via nasal cannula. She is sleepy but arousable. She does not have any shortness of breath or chest pain. She has occasional cough. Examination:  Vitals: BP (!) 100/55   Pulse 75   Temp 98.3 °F (36.8 °C) (Temporal)   Resp 22   Ht 5' 11\" (1.803 m)   Wt 251 lb 9.6 oz (114.1 kg)   SpO2 98%   BMI 35.09 kg/m²   General appearance: alert and cooperative with exam  Neck: No JVD  Lungs: Moderate air exchange, no wheezing or crackles anteriorly  Heart: regular rate and rhythm, S1, S2 normal, no gallop  Abdomen: Soft, non tender, + BS  Extremities: no cyanosis or clubbing.   Trace edema    LABs:  CBC:   Recent Labs     03/04/23  2306 03/05/23  1150 03/06/23  0515   WBC 18.9* 12.4* 12.4*   HGB 12.0 10.3* 9.5*   HCT 36.6 32.4* 30.2*    130* 108*     BMP:   Recent Labs     03/04/23  0625 03/05/23  1150 03/06/23  0515    131* 130*   K 4.4 3.7 3.5*   CO2 22 20 21   BUN 34* 28* 28*   CREATININE 1.04* 0.88 0.85   LABGLOM >60 >60 >60   GLUCOSE 125* 249* 168*       APTT:  Recent Labs     03/04/23  1913 03/05/23 1150 03/05/23 2000   APTT 36.5* >150.0* >150.0*     ABG:  Lab Results   Component Value Date/Time    FIO2 35.0 03/05/2023 06:08 AM       Lab Results   Component Value Date/Time    POCPH 7.505 03/05/2023 06:08 AM    POCPCO2 22.3 03/05/2023 06:08 AM    POCPO2 117.3 03/05/2023 06:08 AM    POCHCO3 17.6 03/05/2023 06:08 AM    NMPE2YHD 99 03/05/2023 06:08 AM    FIO2 35.0 03/05/2023 06:08 AM     Radiology:  3/6/2023  No acute cardiopulmonary process      Impression:  Acute hypoxic respiratory failure  Pulmonary edema  SVT/A-fib/CHF/mild mitral stenosis  Suspected obstructive sleep apnea/Obesity  Acute microinfarcts caudate head and right thalamus on MRI brain  E. coli UTI  Weakness/inability to ambulate  Cognitive impairment/MRDD  A-fib, DM, HTN, HLD, PE, on Eliquis  Hyponatremia    Recommendations:  Oxygen via nasal cannula, keep SPO2 90% or greater  BiPAP support as needed  Precedex drip if necessary  Diuresis with oral Lasix  Neurology following, spinal tap Tuesday to rule out inflammatory etiology for patient's symptoms  Vasculitis panel pending  Cardiology on consult  Rocephin for UTI  Discontinue IV fluids  X-ray chest in am  Labs: CBC and BMP in am  DVT prophylaxis, heparin drip, off Eliquis for lumbar puncture  Discussed with RN  Will follow with you    Electronically signed by     Mable Quintana MD on 3/6/2023 at 12:08 PM  Pulmonary Critical Care and Sleep Medicine,  [facility]  Cell: [phone]  Office: [phone]

## 2023-03-06 NOTE — PROGRESS NOTES
[address] Hospitalist        3/6/2023   12:40 PM    Name:  Isabelle Singletray  MRN:    [id number]     [place]:     [de-identified]   Room:  2029/2029-01k  IP Day: 6     Admit Date: 2/28/2023  5:51 PM  PCP: Anjali Moore MD    C/C:   Chief Complaint   Patient presents with    Fatigue     Unable to walk or stand. Discharged from [facility] today       Assessment:        Generalized weakness  Paroxysmal SVT  Atrial fibrillation with controlled rate  Chronic diastolic congestive heart failure  Hyponatremia  Pulmonary edema  Acute hypoxic respiratory failure   Mild mitral stenosis  Essential hypertension  Diabetes mellitus type 2  Hyperlipidemia  Cognitive impairment/ developmental delay   Morbid obesity      Plan:        Patient is on progressive level  O2 maintain oxygen saturation greater than 92%  BiPAP as needed  Precedex GTT    Oral Lasix  Off IV fluids  X-ray chest in a.m. Cardiology on board  Pulmonology on board      MRI of the brain is consistent with multiple punctate bilateral ischemic strokes. Etiology could be secondary to fibromuscular dysplasia versus CNS vasculitis  Aspirin and Eliquis on hold for spinal tap for 3/7/2023  Vasculitis panel and CRP are pending. ESR is elevated at 77.   Neurology on board      DVT and GI prophylaxis  Continue to monitor/telemetry/CBC with differential daily/BMP daily  Continue medications as below    Scheduled Meds:   metoprolol succinate  75 mg Oral Daily    insulin lispro  0-8 Units SubCUTAneous TID WC    insulin lispro  0-4 Units SubCUTAneous Nightly    sodium chloride  80 mL IntraVENous Once    cefTRIAXone (ROCEPHIN) IV  1,000 mg IntraVENous Q24H    [Held by provider] aspirin  325 mg Oral Daily    miconazole   Topical 4x Daily    gabapentin  100 mg Oral TID    [Held by provider] apixaban  5 mg Oral BID    atorvastatin  20 mg Oral Nightly    dilTIAZem  120 mg Oral BID    fenofibrate  54 mg Oral Daily    furosemide  40 mg Oral Daily    glipiZIDE  2.5 mg Oral QAM AC    metFORMIN  500 mg Oral BID WC    QUEtiapine  25 mg Oral QPM    sertraline  25 mg Oral Daily    spironolactone  25 mg Oral Daily    sodium chloride flush  5-40 mL IntraVENous 2 times per day     Continuous Infusions:   dextrose      dexmedetomidine (PRECEDEX) IV infusion Stopped (23 0308)    sodium chloride       PRN Meds:  glucose, 4 tablet, PRN  dextrose bolus, 125 mL, PRN   Or  dextrose bolus, 250 mL, PRN  glucagon (rDNA), 1 mg, PRN  dextrose, , Continuous PRN  heparin (porcine), 80 Units/kg, PRN  heparin (porcine), 40 Units/kg, PRN  acetaminophen, 650 mg, Q4H PRN  sodium chloride flush, 10 mL, PRN  sodium chloride, , PRN  potassium chloride, 40 mEq, PRN   Or  potassium alternative oral replacement, 40 mEq, PRN   Or  potassium chloride, 10 mEq, PRN  magnesium sulfate, 1,000 mg, PRN  ondansetron, 4 mg, Q8H PRN   Or  ondansetron, 4 mg, Q6H PRN  polyethylene glycol, 17 g, Daily PRN            Subjective:     Patient seen and examined at bedside. Patient has been complaining of some back pain. No acute events reported  Afebrile  Pt. Denies any CP, SOB, palpitation, HA, dizziness, chills, cough, cold, changes in urination, BM or skin changes. ROS:  A 10 point system reviewed and negative otherwise mentioned above. Physical Examination:      Vitals:  /62   Pulse 86   Temp 97.6 °F (36.4 °C) (Temporal)   Resp 24   Ht 5' 11\" (1.803 m)   Wt 251 lb 9.6 oz (114.1 kg)   SpO2 93%   BMI 35.09 kg/m²   Temp (24hrs), Av.8 °F (36.6 °C), Min:97.2 °F (36.2 °C), Max:98.3 °F (36.8 °C)    Weight:   Wt Readings from Last 3 Encounters:   23 251 lb 9.6 oz (114.1 kg)   23 249 lb (112.9 kg)   23 222 lb 14.4 oz (101.1 kg)     I/O last 3 completed shifts:  I/O last 3 completed shifts:   In:  [P.O.:1080; I.V.:3923.5; IV Piggyback:638.5]  Out: 700 [Urine:700]     Recent Labs     23  1707 23  0823 23  1212   POCGLU 190* 208* 164* 242*         General appearance - alert, well appearing, and in no acute distress  Mental status - oriented to person, place, and time with normal affect  Head - normocephalic and atraumatic  Eyes - pupils equal and reactive, extraocular eye movements intact, conjunctiva clear  Ears - hearing appears to be intact  Nose - no drainage noted  Mouth - mucous membranes moist  Neck - supple, no carotid bruits, thyroid not palpable  Chest - clear to auscultation, normal effort  Heart - normal rate, regular rhythm, no murmur  Abdomen - soft, nontender, nondistended, bowel sounds present all four quadrants, no masses, hepatomegaly or splenomegaly  Neurological - normal speech, no focal findings or movement disorder noted, cranial nerves II through XII grossly intact  Extremities - peripheral pulses palpable, no pedal edema or calf pain with palpation  Skin - no gross lesions, rashes, or induration noted        Medications:      Allergies: No Known Allergies    Current Meds:   Current Facility-Administered Medications:     metoprolol succinate (TOPROL XL) extended release tablet 75 mg, 75 mg, Oral, Daily, VANESSA Chase - CNP, 75 mg at 03/06/23 0938    glucose chewable tablet 16 g, 4 tablet, Oral, PRN, Quentin Bustos MD    dextrose bolus 10% 125 mL, 125 mL, IntraVENous, PRN **OR** dextrose bolus 10% 250 mL, 250 mL, IntraVENous, PRN, Quentin Bustos MD    glucagon (rDNA) injection 1 mg, 1 mg, SubCUTAneous, PRN, Quentin Bustos MD    dextrose 10 % infusion, , IntraVENous, Continuous PRN, Quentin Bustos MD    insulin lispro (HUMALOG) injection vial 0-8 Units, 0-8 Units, SubCUTAneous, TID WC, Quentin Bustos MD    insulin lispro (HUMALOG) injection vial 0-4 Units, 0-4 Units, SubCUTAneous, Nightly, Quentin Bustos MD    0.9 % sodium chloride bolus, 80 mL, IntraVENous, Once, Juan Manuel Barnard MD    cefTRIAXone (ROCEPHIN) 1,000 mg in sodium chloride 0.9 % 50 mL IVPB (mini-bag), 1,000 mg, IntraVENous, Q24H, Quentin Bustos MD, Stopped at 03/05/23 1813 heparin (porcine) injection 9,220 Units, 80 Units/kg, IntraVENous, PRN, Quentin Bustos MD    heparin (porcine) injection 4,610 Units, 40 Units/kg, IntraVENous, PRN, Quentin Bustos MD    dexmedetomidine (PRECEDEX) 400 mcg in sodium chloride 0.9 % 100 mL infusion, 0.1-1.5 mcg/kg/hr, IntraVENous, Continuous, Alfred Cade MD, Held at 03/05/23 0308    [Held by provider] aspirin tablet 325 mg, 325 mg, Oral, Daily, Qeuntin Bustos MD, 325 mg at 03/04/23 0954    acetaminophen (TYLENOL) tablet 650 mg, 650 mg, Oral, Q4H PRN, Quentin Bustos MD, 650 mg at 03/06/23 0939    miconazole (MICOTIN) 2 % powder, , Topical, 4x Daily, Quentin Bustos MD, Given at 03/06/23 0116    gabapentin (NEURONTIN) capsule 100 mg, 100 mg, Oral, TID, Malcolm Irving MD, 100 mg at 03/06/23 0939    [Held by provider] apixaban (ELIQUIS) tablet 5 mg, 5 mg, Oral, BID, Quentin Bustos MD, 5 mg at 03/04/23 0954    atorvastatin (LIPITOR) tablet 20 mg, 20 mg, Oral, Nightly, Quetnin Bustos MD, 20 mg at 03/05/23 2012    dilTIAZem (CARDIZEM 12 HR) extended release capsule 120 mg, 120 mg, Oral, BID, Quentin Bustos MD, 120 mg at 03/06/23 0938    fenofibrate (TRICOR) tablet 54 mg, 54 mg, Oral, Daily, Quentin Bustos MD, 54 mg at 03/06/23 0939    furosemide (LASIX) tablet 40 mg, 40 mg, Oral, Daily, Quentin Bustos MD, 40 mg at 03/06/23 0939    glipiZIDE (GLUCOTROL) tablet 2.5 mg, 2.5 mg, Oral, QAM AC, Quentin Bustos MD, 2.5 mg at 03/06/23 1110    metFORMIN (GLUCOPHAGE) tablet 500 mg, 500 mg, Oral, BID WC, Quentin Bustos MD, 500 mg at 03/06/23 0939    QUEtiapine (SEROQUEL) tablet 25 mg, 25 mg, Oral, QPM, Quentin Bustos MD, 25 mg at 03/05/23 1715    sertraline (ZOLOFT) tablet 25 mg, 25 mg, Oral, Daily, Quentin Bustos MD, 25 mg at 03/06/23 4523    spironolactone (ALDACTONE) tablet 25 mg, 25 mg, Oral, Daily, Quentin Bustos MD, 25 mg at 03/06/23 0939    sodium chloride flush 0.9 % injection 5-40 mL, 5-40 mL, IntraVENous, 2 times per day, Mitra Kevin, APRN - CNP, 10 mL at 03/05/23 2017    sodium chloride flush 0.9 % injection 10 mL, 10 mL, IntraVENous, PRN, Mitra A Lump, APRN - CNP, 10 mL at 03/04/23 2156    0.9 % sodium chloride infusion, , IntraVENous, PRN, Mitra A Lump, APRN - CNP    potassium chloride (KLOR-CON M) extended release tablet 40 mEq, 40 mEq, Oral, PRN **OR** potassium bicarb-citric acid (EFFER-K) effervescent tablet 40 mEq, 40 mEq, Oral, PRN **OR** potassium chloride 10 mEq/100 mL IVPB (Peripheral Line), 10 mEq, IntraVENous, PRN, Mitra A Lump, APRN - CNP    magnesium sulfate 1000 mg in dextrose 5% 100 mL IVPB, 1,000 mg, IntraVENous, PRN, Mitra A Lump, APRN - CNP, Stopped at 03/06/23 0932    ondansetron (ZOFRAN-ODT) disintegrating tablet 4 mg, 4 mg, Oral, Q8H PRN **OR** ondansetron (ZOFRAN) injection 4 mg, 4 mg, IntraVENous, Q6H PRN, Mitra A Lump, APRN - CNP, 4 mg at 03/05/23 0701    polyethylene glycol (GLYCOLAX) packet 17 g, 17 g, Oral, Daily PRN, Mitra A Lump, APRN - CNP      I/O (24Hr):     Intake/Output Summary (Last 24 hours) at 3/6/2023 1240  Last data filed at 3/6/2023 0426  Gross per 24 hour   Intake 783.74 ml   Output 600 ml   Net 183.74 ml       Data:           Labs:    Hematology:  Recent Labs     03/03/23  1834 03/04/23  2306 03/05/23  1150 03/06/23  0008 03/06/23  0055 03/06/23  0515   WBC  --  18.9* 12.4*  --   --  12.4*   RBC  --  4.24 3.79*  --   --  3.52*   HGB  --  12.0 10.3*  --   --  9.5*   HCT  --  36.6 32.4*  --   --  30.2*   MCV  --  86.3 85.5  --   --  85.8   MCH  --  28.3 27.2  --   --  27.0   MCHC  --  32.8 31.8  --   --  31.5   RDW  --  15.2* 15.0*  --   --  14.9*   PLT  --  202 130*  --   --  108*   MPV  --  11.2 11.3  --   --  11.3   SEDRATE  --   --   --   --  77*  --    .1*  --   --  58.7*  --   --      Chemistry:  Recent Labs     03/04/23  0625 03/04/23  2217 03/05/23  1150 03/05/23 2020 03/06/23  0008 03/06/23  0206 03/06/23  0406 03/06/23  0515 03/06/23  0710     --  131*  --   --   --   --  130*  --    K 4.4  --  3.7  --   -- --   --  3.5*  --      --  100  --   --   --   --  101  --    CO2 22  --  20  --   --   --   --  21  --    GLUCOSE 125*  --  249*  --   --   --   --  168*  --    BUN 34*  --  28*  --   --   --   --  28*  --    CREATININE 1.04*  --  0.88  --   --   --   --  0.85  --    MG  --   --   --   --   --   --   --  1.6  --    ANIONGAP 11  --  11  --   --   --   --  8*  --    LABGLOM >60  --  >60  --   --   --   --  >60  --    CALCIUM 8.5*  --  7.9*  --   --   --   --  7.6*  --    TROPHS  --    < > 146* 173* 197* 203* 214*  --  201*   MYOGLOBIN  --    < > 24* 27 25 27  --   --   --     < > = values in this interval not displayed. Recent Labs     03/05/23  0659 03/05/23  1155 03/05/23  1707 03/05/23 2019 03/06/23  0515 03/06/23  0823 03/06/23  1212   LABA1C  --   --   --   --  7.8*  --   --    POCGLU 230* 227* 190* 208*  --  164* 242*       Lab Results   Component Value Date/Time    SPECIAL NOT REPORTED 08/29/2012 06:42 PM     Lab Results   Component Value Date/Time    CULTURE NO GROWTH 1 DAY 03/05/2023 12:27 AM       Lab Results   Component Value Date/Time    POCPH 7.505 03/05/2023 06:08 AM    POCPCO2 22.3 03/05/2023 06:08 AM    POCPO2 117.3 03/05/2023 06:08 AM    POCHCO3 17.6 03/05/2023 06:08 AM    NBEA 4 03/05/2023 06:08 AM    HACZ4LII 99 03/05/2023 06:08 AM    FIO2 35.0 03/05/2023 06:08 AM       Radiology:    XR LUMBAR SPINE (2-3 VIEWS)    Result Date: 3/3/2023  No evidence of fracture, compression or dislocation in the lumbar spine or in visualized upper sacrum. At L5-S1 level, severe degenerative disc disease and mild-to-moderate facet osteoarthritis. At L4-L5 level severe degenerative disc disease and moderate facet osteoarthritis. At L3-L4 level moderate degenerative disc disease and moderate facet osteoarthritis. CT HEAD WO CONTRAST    Result Date: 3/3/2023  Multiple remote infarcts without acute intracranial abnormality. XR CHEST PORTABLE    Result Date: 3/6/2023  No acute process.  Right-sided PICC terminates in the right atrium     XR CHEST PORTABLE    Result Date: 3/5/2023  Linear infiltrates seen in both lungs persist suggesting pulmonary edema     XR CHEST PORTABLE    Result Date: 3/3/2023  Findings suggest congestive heart failure     XR CHEST 1 VIEW    Result Date: 3/4/2023  Worsening pattern of pulmonary edema. CTA HEAD NECK W CONTRAST    Result Date: 3/4/2023  CT Brain: 1. Punctate acute infarcts seen on recent MRI are not well appreciated on CT modality. 2. Otherwise, no acute intracranial abnormality. 3. Chronic encephalomalacia/gliosis present the bilateral parietal lobes and right temporal lobe. CTA Head/Neck: Limited evaluation secondary to poor contrast bolus/imaging timing, motion degradation, and patient positioning. 1. Diffusely irregular and likely severely stenotic bilateral cervical and intracranial ICAs. Finding may in part be related to an underlying angiopathy such as fibromuscular dysplasia or chronic dissection/thrombosis. Aforementioned limitations limit evaluation but findings are likely similar to 04/09/2021 CTA. Can consider repeat evaluation if clinically indicated. 2. Otherwise, no evidence of large vessel occlusion or significant stenosis in the remaining major arteries of the head and neck. 3. Prominent appearance of the partially visualized pulmonary artery. Finding may be related to pulmonary arterial hypertension. MRI BRAIN W WO CONTRAST    Result Date: 3/3/2023  Acute microinfarcts within the caudate heads and right thalamus. The findings were sent to the Radiology Results [address] at 5:30 pm on 3/3/2023 to be communicated to a licensed caregiver.          All radiological studies reviewed  Code Status:  Full Code        Electronically signed by Yudy Hutn MD on 3/6/2023 at 12:40 PM    This note was created with the assistance of a speech-recognition program.  Although the intention is to generate a document that actually reflects the content of the visit, no guarantees can be provided that every mistake has been identified and corrected by editing. Note was updated later by me after  physical examination and  completion of the assessment.

## 2023-03-06 NOTE — PROGRESS NOTES
[address]  Occupational Therapy Not Seen    DATE: 3/6/2023    NAME: Carlos Day  MRN: [id number]   : 1960    Patient not seen this date for Occupational Therapy due to:      [] Cancel by RN or physician due to:    [] Hemodialysis    [] Critical Lab Value Level     [] Blood transfusion in progress    [] Acute or unstable cardiovascular status   _MAP < 55 or more than >115  _HR < 40 or > 130    [] Acute or unstable pulmonary status   -FiO2 > 60%   _RR < 5 or >40    _O2 sats < 85%    [] Strict Bedrest    [] Off Unit for surgery or procedure    [] Off Unit for testing       [] Pending imaging to R/O fracture    [x] Refusal by Patient (Attempted to initiate therapy with pt repeating at staff \"tomorrow\" not wanting to participate in therapy at this time. Writer encouraged participation later today but pt still verbalizing \"tomorrow. \" Will continue to follow). [] Other      [] OT being discontinued at this time. Patient independent. No further needs. [] OT being discontinued at this time as the patient has been transferred to hospice care. No further needs.       JOANNA Hahn

## 2023-03-06 NOTE — PROGRESS NOTES
Physical Therapy  DATE: 3/6/2023    NAME: Talya Garcia  MRN: [id number]   : 1960    Patient not seen this date for Physical Therapy due to:      [] Cancel by RN or physician due to:    [] Hemodialysis    [] Critical Lab Value Level     [] Blood transfusion in progress    [] Acute or unstable cardiovascular status   _MAP < 55 or more than >115  _HR < 40 or > 130    [] Acute or unstable pulmonary status   -FiO2 > 60%   _RR < 5 or >40    _O2 sats < 85%    [] Strict Bedrest    [] Off Unit for surgery or procedure    [] Off Unit for testing       [] Pending imaging to R/O fracture    [x] Refusal by Patient  Pt states tomorrow she will participate in PT/OT RN aware of refusal      [] Other      [] PT being discontinued at this time. Patient independent. No further needs. [] PT being discontinued at this time as the patient has been transferred to hospice care. No further needs.       STAR BANKS, PTA

## 2023-03-06 NOTE — PROGRESS NOTES
[facility] ([facility]) Neurology Specialist  [address]  [place], [address] [facility] [address]:  [phone] or [phone]  FAX:  [phone]            Brief history: Brisa Calvert is a 60 y.o. old female admitted on 2/28/2023 with encephalopathy     Subjective: Overnight events are reviewed. LP planned for 3/7/2023. Discussed with nursing at length. Objective: /62   Pulse 86   Temp 98.3 °F (36.8 °C) (Temporal)   Resp 24   Ht 5' 11\" (1.803 m)   Wt 251 lb 9.6 oz (114.1 kg)   SpO2 93%   BMI 35.09 kg/m²       Medications:    metoprolol succinate  75 mg Oral Daily    insulin lispro  0-8 Units SubCUTAneous TID WC    insulin lispro  0-4 Units SubCUTAneous Nightly    sodium chloride  80 mL IntraVENous Once    cefTRIAXone (ROCEPHIN) IV  1,000 mg IntraVENous Q24H    [Held by provider] aspirin  325 mg Oral Daily    miconazole   Topical 4x Daily    gabapentin  100 mg Oral TID    [Held by provider] apixaban  5 mg Oral BID    atorvastatin  20 mg Oral Nightly    dilTIAZem  120 mg Oral BID    fenofibrate  54 mg Oral Daily    furosemide  40 mg Oral Daily    glipiZIDE  2.5 mg Oral QAM AC    metFORMIN  500 mg Oral BID WC    QUEtiapine  25 mg Oral QPM    sertraline  25 mg Oral Daily    spironolactone  25 mg Oral Daily    sodium chloride flush  5-40 mL IntraVENous 2 times per day        Neurological examination:    Mental status   Awake. Speech dysarthric. Able to follow commands and answer questions like location, name and name objects. Cranial nerves   Face is symmetric. Extraocular movements appear intact. Pupils are round and reactive     Motor function  Spontaneous movement in both upper and lower extremities, globally weak   Sensory function Unable to assess   Cerebellar No visible tremors   Reflex function Intact 2+ DTR and symmetric.  Negative Babinski     Gait                  Not tested       Lab Results   Component Value Date    LDLCHOLESTEROL 69 05/07/2014      No components found for: CHLPL   Lab Results   Component Value Date    TRIG 137 05/07/2014    TRIG 114 12/06/2012      Lab Results   Component Value Date    HDL 39 (L) 05/07/2014    HDL 44 12/06/2012      No results found for: LDLCALC   No results found for: LABVLDL   Lab Results   Component Value Date    LABA1C 7.8 (H) 03/06/2023      Lab Results   Component Value Date     03/06/2023      No results found for: Kathline Backbone     Neurological work up:  Study Finding   CT head    CTA Head and neck Markedly abnormal.  Differential includes vasculitis/chronic dissection/FMD.     MRI brain  4/3/2021 Numerous foci in diffuse regions of periventricular white matter signal abnormalities with involvement of splenium of corpus callosum. Enhancement of subcortical region and periventricular white matter. Etiology unclear. Possible lacunar subacute strokes versus demyelinating disease financing lesions versus cerebritis. 2 D Echo    RAJESH    5 days video EEG monitoring  4/11/2021 Generalized slowing consistent with encephalopathy. Assessment and Recommendations:     Punctate bilateral subcortical ischemic strokes  MRI of the brain is consistent with multiple punctate bilateral ischemic strokes. Etiology could be secondary to fibromuscular dysplasia versus CNS vasculitis. Overnight events were reviewed. The patient neurologically unchanged. Oriented to self and able to follow one-step and two commands. Aspirin and Eliquis on hold for spinal tap for 3/7/2023. We will check for CSF glucose, protein, cell count, IgG index, myelin basic protein, encephalitis panel. Vasculitis panel and CRP are pending. ESR is elevated at 77. We will follow. Jennifer Nair D.O.    Neurology    This note is created with the assistance of a speech-recognition program. While intending to generate a document that actually reflects the content of the visit, the document can still have some errors including those of syntax and sound a- like substitutions which may escape proofreading. In such instances, actual meaning can be extrapolated by contextual derivation.

## 2023-03-06 NOTE — PLAN OF CARE
Problem: Respiratory - Adult  Goal: Achieves optimal ventilation and oxygenation  3/6/2023 1828 by Dillon Cooper RCP  Outcome: Progressing     Problem: Respiratory - Adult  Goal: Able to breathe comfortably  Outcome: Progressing     Problem: Respiratory - Adult  Goal: Normal respiration  Outcome: Progressing     Problem: Respiratory - Adult  Goal: Adequate oxygenation  Description: Adequate oxygenation  Outcome: Progressing

## 2023-03-07 ENCOUNTER — APPOINTMENT (OUTPATIENT)
Dept: INTERVENTIONAL RADIOLOGY/VASCULAR | Age: 63
DRG: 064 | End: 2023-03-07
Payer: MEDICARE

## 2023-03-07 LAB
ABSOLUTE EOS #: 0.03 K/UL (ref 0–0.44)
ABSOLUTE IMMATURE GRANULOCYTE: 0.08 K/UL (ref 0–0.3)
ABSOLUTE LYMPH #: 1.52 K/UL (ref 1.1–3.7)
ABSOLUTE MONO #: 0.64 K/UL (ref 0.1–1.2)
ANION GAP SERPL CALCULATED.3IONS-SCNC: 7 MMOL/L (ref 9–17)
APPEARANCE CSF: CLEAR
BASOPHILS # BLD: 0 % (ref 0–2)
BASOPHILS ABSOLUTE: <0.03 K/UL (ref 0–0.2)
BUN SERPL-MCNC: 26 MG/DL (ref 8–23)
BUN/CREAT BLD: 30 (ref 9–20)
C NEOFORM RRNA SPEC NAA+PROBE-ACNC: NOT DETECTED
CALCIUM SERPL-MCNC: 8 MG/DL (ref 8.6–10.4)
CHLORIDE SERPL-SCNC: 105 MMOL/L (ref 98–107)
CMV DNA CSF QL NAA+PROBE: NOT DETECTED
CO2 SERPL-SCNC: 23 MMOL/L (ref 20–31)
CREAT SERPL-MCNC: 0.87 MG/DL (ref 0.5–0.9)
E COLI DNA SPEC QL NAA+PROBE: NOT DETECTED
EOSINOPHILS RELATIVE PERCENT: 0 % (ref 1–4)
EV RNA CSF QL NAA+PROBE: NOT DETECTED
FLUID DIFF COMMENT: NORMAL
GFR SERPL CREATININE-BSD FRML MDRD: >60 ML/MIN/1.73M2
GLUCOSE BLD-MCNC: 117 MG/DL (ref 65–105)
GLUCOSE BLD-MCNC: 137 MG/DL (ref 65–105)
GLUCOSE BLD-MCNC: 164 MG/DL (ref 65–105)
GLUCOSE BLD-MCNC: 198 MG/DL (ref 65–105)
GLUCOSE SERPL-MCNC: 159 MG/DL (ref 70–99)
GLUCOSE, CSF: 82 MG/DL (ref 40–70)
GP B STREP DNA SPEC QL NAA+PROBE: NOT DETECTED
HAEM INFLU DNA SPEC QL NAA+PROBE: NOT DETECTED
HCT VFR BLD AUTO: 28.6 % (ref 36.3–47.1)
HGB BLD-MCNC: 9 G/DL (ref 11.9–15.1)
HHV6 DNA CSF QL NAA+PROBE: NOT DETECTED
HSV1 DNA CSF QL NAA+PROBE: NOT DETECTED
HSV2 DNA CSF QL NAA+PROBE: NOT DETECTED
IMMATURE GRANULOCYTES: 1 %
L MONOCYTOG DNA SPEC QL NAA+PROBE: NOT DETECTED
LYMPHOCYTES # BLD: 14 % (ref 24–43)
MAGNESIUM SERPL-MCNC: 1.9 MG/DL (ref 1.6–2.6)
MCH RBC QN AUTO: 27.2 PG (ref 25.2–33.5)
MCHC RBC AUTO-ENTMCNC: 31.5 G/DL (ref 28.4–34.8)
MCV RBC AUTO: 86.4 FL (ref 82.6–102.9)
MONOCYTES # BLD: 6 % (ref 3–12)
MYELOPEROXIDASE AB: 0 AU/ML (ref 0–19)
N MEN DNA SPEC QL NAA+PROBE: NOT DETECTED
NEUTROPHILS, CSF: 11 %
NRBC AUTOMATED: 0 PER 100 WBC
PARECHOVIRUS A RNA SPEC QL NAA+PROBE: NOT DETECTED
PARTIAL THROMBOPLASTIN TIME: 67.2 SEC (ref 23.9–33.8)
PARTIAL THROMBOPLASTIN TIME: 67.3 SEC (ref 23.9–33.8)
PARTIAL THROMBOPLASTIN TIME: 68.2 SEC (ref 23.9–33.8)
PDW BLD-RTO: 14.8 % (ref 11.8–14.4)
PLATELET # BLD AUTO: 114 K/UL (ref 138–453)
PMV BLD AUTO: 11.7 FL (ref 8.1–13.5)
POTASSIUM SERPL-SCNC: 3.4 MMOL/L (ref 3.7–5.3)
PROTEIN CSF: 80 MG/DL (ref 15–45)
RBC # BLD: 3.31 M/UL (ref 3.95–5.11)
RBC # BLD: ABNORMAL 10*6/UL
RBC CSF: 38 CELLS/UL
S PNEUM DNA SPEC QL NAA+PROBE: NOT DETECTED
SEG NEUTROPHILS: 79 % (ref 36–65)
SEGMENTED NEUTROPHILS ABSOLUTE COUNT: 8.69 K/UL (ref 1.5–8.1)
SODIUM SERPL-SCNC: 135 MMOL/L (ref 135–144)
SPECIMEN DESCRIPTION: NORMAL
SUPERNAT COLOR CSF: COLORLESS
TUBE NUMBER CSF: 3
VOLUME CSF: ABNORMAL
VZV DNA CSF QL NAA+PROBE: NOT DETECTED
WBC # BLD AUTO: 11 K/UL (ref 3.5–11.3)
WBC CSF: 10 CELLS/UL
XANTHOCHROMIA: ABNORMAL

## 2023-03-07 PROCEDURE — 80048 BASIC METABOLIC PNL TOTAL CA: CPT

## 2023-03-07 PROCEDURE — 97530 THERAPEUTIC ACTIVITIES: CPT

## 2023-03-07 PROCEDURE — 86225 DNA ANTIBODY NATIVE: CPT

## 2023-03-07 PROCEDURE — 82042 OTHER SOURCE ALBUMIN QUAN EA: CPT

## 2023-03-07 PROCEDURE — 85025 COMPLETE CBC W/AUTO DIFF WBC: CPT

## 2023-03-07 PROCEDURE — 82784 ASSAY IGA/IGD/IGG/IGM EACH: CPT

## 2023-03-07 PROCEDURE — 83873 ASSAY OF CSF PROTEIN: CPT

## 2023-03-07 PROCEDURE — 82040 ASSAY OF SERUM ALBUMIN: CPT

## 2023-03-07 PROCEDURE — 87205 SMEAR GRAM STAIN: CPT

## 2023-03-07 PROCEDURE — 94761 N-INVAS EAR/PLS OXIMETRY MLT: CPT

## 2023-03-07 PROCEDURE — 62328 DX LMBR SPI PNXR W/FLUOR/CT: CPT

## 2023-03-07 PROCEDURE — 86038 ANTINUCLEAR ANTIBODIES: CPT

## 2023-03-07 PROCEDURE — 2060000000 HC ICU INTERMEDIATE R&B

## 2023-03-07 PROCEDURE — 85730 THROMBOPLASTIN TIME PARTIAL: CPT

## 2023-03-07 PROCEDURE — 83516 IMMUNOASSAY NONANTIBODY: CPT

## 2023-03-07 PROCEDURE — 6370000000 HC RX 637 (ALT 250 FOR IP): Performed by: FAMILY MEDICINE

## 2023-03-07 PROCEDURE — 6370000000 HC RX 637 (ALT 250 FOR IP): Performed by: NURSE PRACTITIONER

## 2023-03-07 PROCEDURE — 2580000003 HC RX 258: Performed by: FAMILY MEDICINE

## 2023-03-07 PROCEDURE — 2709999900 IR LUMBAR PUNCTURE FOR DIAGNOSIS

## 2023-03-07 PROCEDURE — 87070 CULTURE OTHR SPECIMN AEROBIC: CPT

## 2023-03-07 PROCEDURE — 6360000002 HC RX W HCPCS: Performed by: HOSPITALIST

## 2023-03-07 PROCEDURE — 6360000002 HC RX W HCPCS: Performed by: FAMILY MEDICINE

## 2023-03-07 PROCEDURE — 99233 SBSQ HOSP IP/OBS HIGH 50: CPT | Performed by: PSYCHIATRY & NEUROLOGY

## 2023-03-07 PROCEDURE — 97535 SELF CARE MNGMENT TRAINING: CPT

## 2023-03-07 PROCEDURE — 84157 ASSAY OF PROTEIN OTHER: CPT

## 2023-03-07 PROCEDURE — 83735 ASSAY OF MAGNESIUM: CPT

## 2023-03-07 PROCEDURE — 83916 OLIGOCLONAL BANDS: CPT

## 2023-03-07 PROCEDURE — 82947 ASSAY GLUCOSE BLOOD QUANT: CPT

## 2023-03-07 PROCEDURE — 2700000000 HC OXYGEN THERAPY PER DAY

## 2023-03-07 PROCEDURE — 89051 BODY FLUID CELL COUNT: CPT

## 2023-03-07 PROCEDURE — 87483 CNS DNA AMP PROBE TYPE 12-25: CPT

## 2023-03-07 PROCEDURE — 82945 GLUCOSE OTHER FLUID: CPT

## 2023-03-07 RX ADMIN — SPIRONOLACTONE 25 MG: 25 TABLET ORAL at 08:50

## 2023-03-07 RX ADMIN — ANTI-FUNGAL POWDER MICONAZOLE NITRATE TALC FREE: 1.42 POWDER TOPICAL at 17:39

## 2023-03-07 RX ADMIN — QUETIAPINE FUMARATE 25 MG: 25 TABLET ORAL at 18:04

## 2023-03-07 RX ADMIN — FENOFIBRATE 54 MG: 54 TABLET ORAL at 08:49

## 2023-03-07 RX ADMIN — FUROSEMIDE 40 MG: 40 TABLET ORAL at 08:50

## 2023-03-07 RX ADMIN — GABAPENTIN 100 MG: 100 CAPSULE ORAL at 20:28

## 2023-03-07 RX ADMIN — ANTI-FUNGAL POWDER MICONAZOLE NITRATE TALC FREE: 1.42 POWDER TOPICAL at 14:46

## 2023-03-07 RX ADMIN — HEPARIN SODIUM AND DEXTROSE 12 UNITS/KG/HR: 10000; 5 INJECTION INTRAVENOUS at 08:57

## 2023-03-07 RX ADMIN — GLIPIZIDE 2.5 MG: 5 TABLET ORAL at 06:27

## 2023-03-07 RX ADMIN — DILTIAZEM HYDROCHLORIDE 120 MG: 60 CAPSULE, EXTENDED RELEASE ORAL at 08:49

## 2023-03-07 RX ADMIN — METFORMIN HYDROCHLORIDE 500 MG: 500 TABLET, FILM COATED ORAL at 08:50

## 2023-03-07 RX ADMIN — METOPROLOL SUCCINATE 75 MG: 50 TABLET, EXTENDED RELEASE ORAL at 08:49

## 2023-03-07 RX ADMIN — POTASSIUM CHLORIDE 40 MEQ: 1500 TABLET, EXTENDED RELEASE ORAL at 06:27

## 2023-03-07 RX ADMIN — DILTIAZEM HYDROCHLORIDE 120 MG: 60 CAPSULE, EXTENDED RELEASE ORAL at 20:28

## 2023-03-07 RX ADMIN — ANTI-FUNGAL POWDER MICONAZOLE NITRATE TALC FREE: 1.42 POWDER TOPICAL at 08:50

## 2023-03-07 RX ADMIN — CEFTRIAXONE SODIUM 1000 MG: 1 INJECTION, POWDER, FOR SOLUTION INTRAMUSCULAR; INTRAVENOUS at 18:04

## 2023-03-07 RX ADMIN — SERTRALINE 25 MG: 25 TABLET, FILM COATED ORAL at 08:50

## 2023-03-07 RX ADMIN — ACETAMINOPHEN 650 MG: 325 TABLET ORAL at 03:43

## 2023-03-07 RX ADMIN — ACETAMINOPHEN 650 MG: 325 TABLET ORAL at 14:59

## 2023-03-07 RX ADMIN — ANTI-FUNGAL POWDER MICONAZOLE NITRATE TALC FREE: 1.42 POWDER TOPICAL at 20:33

## 2023-03-07 RX ADMIN — ATORVASTATIN CALCIUM 20 MG: 20 TABLET, FILM COATED ORAL at 20:28

## 2023-03-07 RX ADMIN — GABAPENTIN 100 MG: 100 CAPSULE ORAL at 14:45

## 2023-03-07 RX ADMIN — GABAPENTIN 100 MG: 100 CAPSULE ORAL at 08:50

## 2023-03-07 RX ADMIN — METFORMIN HYDROCHLORIDE 500 MG: 500 TABLET, FILM COATED ORAL at 18:04

## 2023-03-07 ASSESSMENT — PAIN SCALES - GENERAL
PAINLEVEL_OUTOF10: 4
PAINLEVEL_OUTOF10: 4

## 2023-03-07 ASSESSMENT — PAIN DESCRIPTION - DESCRIPTORS
DESCRIPTORS: ACHING
DESCRIPTORS: ACHING

## 2023-03-07 ASSESSMENT — PAIN DESCRIPTION - LOCATION
LOCATION: HEAD
LOCATION: HEAD

## 2023-03-07 ASSESSMENT — PAIN DESCRIPTION - ORIENTATION: ORIENTATION: MID

## 2023-03-07 NOTE — PROGRESS NOTES
[address]  PROGRESS NOTE    Room # 2029/2029-01   Name: Brisa Calvert            Caodaism: Unknown     Reason for visit: Follow up    I visited the patient. Admit Date & Time: 2/28/2023  5:51 PM    Assessment:  Brisa Calvert is a 60 y.o. female in the hospital because she fell at [facility]. Upon entering the room patient is found resting in bed, she is very lethargic. Intervention:  I introduced myself and my title as  I offered space for patient  to express feelings, needs, and concerns and provided a ministry presence. Patient will be going back [facility]. She does not have good recall of what brought her to the ED. She reports back headaches. Outcome:  Patient at rest.    Plan:  Radha Obrien will remain available to offer spiritual and emotional support as needed. Electronically signed by Lizzeth Powers on 3/6/2023 at 8:58 PM.  Betty     03/06/23 2056   Encounter Summary   Service Provided For: Patient   Referral/Consult From: [facility]   Support System /;Home care staff   Last Encounter  03/06/23   Complexity of Encounter Low   Begin Time 1600   End Time  1620   Total Time Calculated 20 min   Encounter    Type Follow up   Spiritual/Emotional needs   Type Spiritual Support   Assessment/Intervention/Outcome   Assessment Calm; Compromised coping; Impaired resilience   Intervention Nurtured Hope;Prayer (assurance of)/[place];Sustaining Presence/Ministry of presence   Outcome Expressed Gratitude;Receptive; Connection/Belonging

## 2023-03-07 NOTE — PROGRESS NOTES
End Of Shift Note  [facility]  Summary of shift: Pt had lumbar puncture done today w IR. Awaiting results. Heparin gtt restarted at 12units/kg/hr when pt returned. Pt has been more alert and oriented today. Has had x2 Bms and 2 unmeasured urine outputs and 400ml in external purwick.      Vitals:    Vitals:    03/07/23 1550 03/07/23 1622 03/07/23 1700 03/07/23 1747   BP: 106/72 102/64 (!) 115/58    Pulse: 75 75 75    Resp: 17 24 20    Temp:  97.8 °F (36.6 °C)     TempSrc:  Temporal     SpO2: 99%      Weight:       Height:    5' 11\" (1.803 m)        I&O:   Intake/Output Summary (Last 24 hours) at 3/7/2023 1814  Last data filed at 3/7/2023 1754  Gross per 24 hour   Intake 885.18 ml   Output 850 ml   Net 35.18 ml       Resp Status: 2lpm      Ventilator Settings:     / / /FiO2 : 30 %    Critical Care IV infusions:   heparin (PORCINE) Infusion 12 Units/kg/hr (03/07/23 1736)    dextrose      dexmedetomidine (PRECEDEX) IV infusion Stopped (03/05/23 0308)    sodium chloride          LDA:   PICC Double Lumen 63/45/68 Right Basilic (Active)   Number of days: 2       Peripheral IV 02/28/23 Right Antecubital (Active)   Number of days: 6       Peripheral IV 03/04/23 Left Forearm (Active)   Number of days: 3       External Urinary Catheter (Active)   Number of days: 2

## 2023-03-07 NOTE — PROGRESS NOTES
Occupational Therapy  Facility/Department: [facility]  Rehabilitation Occupational Therapy Daily Treatment Note    Date: 3/7/23  Patient Name: Jesus Ulloa       Room:   MRN: [id number]  Account: [de-identified]   : 1960  (63 y.o.) Gender: female      HELLEN Hobson reports patient is medically stable for therapy treatment this date. Chart reviewed prior to treatment and patient is agreeable for therapy. All lines intact and patient positioned comfortably at end of treatment. All patient needs addressed prior to ending therapy session. Pt currently functioning below baseline. Recommend daily inpatient skilled therapy at time of discharge to maximize long term outcomes and prevent re-admission. Please refer to AM-PAC score for current level of function. Past Medical History:  has a past medical history of Cerebral artery occlusion with cerebral infarction ([facility] [address] Diabetes mellitus ([address] HTN (hypertension), Mental disability, Pulmonary emboli ([address] and SVT (supraventricular tachycardia) ([facility] [address] Past Surgical History:   has a past surgical history that includes IR INSERT PICC VAD W SQ PORT >5 YEARS (3/5/2023). Restrictions  Restrictions/Precautions: Fall Risk, Up as Tolerated, Bed Alarm, General Precautions  Other position/activity restrictions: MRDD    Subjective  Subjective: Pt resting supine in bed upon arrival requiring Max encouragement/edu for participation in therapy. Pt agitated throughout. Restrictions/Precautions: Fall Risk;Up as Tolerated; Bed Alarm;General Precautions             Objective     Cognition  Overall Cognitive Status: Exceptions  Arousal/Alertness: Appropriate responses to stimuli  Following Commands: Inconsistently follows commands  Attention Span: Difficulty attending to directions  Memory: Decreased recall of biographical Information;Decreased recall of precautions;Decreased recall of recent events  Safety Judgement: Decreased awareness of need for safety;Decreased awareness of need for assistance  Problem Solving: Assistance required to generate solutions;Assistance required to implement solutions;Decreased awareness of errors;Assistance required to correct errors made  Insights: Decreased awareness of deficits  Initiation: Requires cues for all  Sequencing: Requires cues for all  Cognition Comment: MRDD; speech difficult to understand, pt easily agitated at times, can be redirected relatively easily. Orientation  Overall Orientation Status: Impaired  Orientation Level: Oriented to person;Disoriented to time;Disoriented to situation;Disoriented to place         ADL  Toileting  Skilled Clinical Factors: Purewick in place. No needs at this time. Functional Mobility  Skilled Clinical Factors: Unable to attempt at this time d/t pt not able to complete full stand  Bed Mobility  Overall Assistance Level: Requires x 2 Assistance;Maximum Assistance  Additional Factors: With handrails; Increased time to complete;Verbal cues; Head of bed raised  Sit to Supine  Assistance Level: Requires x 2 assistance;Dependent  Skilled Clinical Factors: Required x3 staff members to assist pt back to supine in bed. Supine to Sit  Assistance Level: Requires x 2 assistance;Maximum assistance  Skilled Clinical Factors: Required Max A x2 and increased time/effort for pt complete and Max verbal/tactile cueing throughout with pt becoming agitated at times wanting to complete on own but is unable. Scooting  Assistance Level: Dependent; Requires x 2 assistance  Skilled Clinical Factors: Scooting fully to EOB and x4 staff members needed for scooting up higher up in bed while supine  Transfers  Additional Factors: Increased time to complete;Hand placement cues; Verbal cues  Device: Lift equipment (laine stedy)  Sit to Stand  Assistance Level: Dependent; Requires x 2 assistance  Skilled Clinical Factors: Attempted 1  laine [place] with bed raised requiring 3-4 staff members and pt was unable to stand fully upright and tuck hips in enough for laine stedy seats to be placed. Pt also screaming in pain and emotional throughout. Stand to Sit  Assistance Level: Requires x 2 assistance;Dependent         Assessment  Assessment  Assessment: Pt agitated throughout session and poor motivation to participate. Pt attempted standing in laine stedy but was unable to complete full stand even with x4 staff members. Continued skilled OT services are indicated at this time to maximize this pt's safety and IND with self care tasks and to facilitate safe return to PLOF as able. Activity Tolerance: Treatment limited secondary to decreased cognition  Discharge Recommendations: Patient would benefit from continued therapy after discharge  Safety Devices  Safety Devices in place: Yes  Type of devices: Nurse notified;Call light within reach; Left in bed;Patient at risk for falls; Bed alarm in place;Gait belt; All fall risk precautions in place    Patient Education  Education  Education Given To: Patient  Education Provided: Role of Therapy; Safety; Energy Conservation;Transfer Training;Precautions  Education Method: Verbal;Demonstration  Barriers to Learning: Cognition  Education Outcome: Continued education needed    Plan  Occupational Therapy Plan  Times Per Week: 4-5x/week  Current Treatment Recommendations: Strengthening;Balance training;Functional mobility training;Self-Care / ADL; Safety education & training; Endurance training;Patient/Caregiver education & training;Equipment evaluation, education, & procurement;Positioning;Cognitive/Perceptual training;Cognitive reorientation    Goals  Patient Goals   Patient goals : \"I am going to nursing home\"  Short Term Goals  Time Frame for Short Term Goals: by discharge, pt will  Short Term Goal 1: demo min A x 1 with bed mob with rails/controls  Short Term Goal 2: demo mod A x 2 with ADL transfers with approp AD/DME as needed with good safety  Short Term Goal 3: demo SBA with sitting balance at EOB x 15 min for simple ADL completion and prep for transfers  Short Term Goal 4: participate in B UE HEP for strengthening/ROM to inc. function with transfers/AdLs/mob  Short Term Goal 5: demo SBA with simple grooming task and UB ADLs with mod cues for initiation/sequencing    AM-PAC Score        AM-PAC Inpatient Daily Activity Raw Score: 8 (03/07/23 1410)  AM-PAC Inpatient ADL T-Scale Score : 22.86 (03/07/23 1410)  ADL Inpatient CMS 0-100% Score: 85.69 (03/07/23 1410)  ADL Inpatient CMS G-Code Modifier : CM (03/07/23 1410)      Therapy Time   Co-Treatment Concurrent Group Co-treatment   Time In 1321         Time Out 1344         Minutes 23               Co-treatment with PT warranted secondary to decreased safety and independence requiring 2 skilled therapy professionals to address individual discipline's goals. OT addressing preparation for ADL transfer, sitting balance for increased ADL performance, sitting/activity tolerance, functional reaching, environmental safety/scanning, fall prevention, functional mobility for ADL transfers, ability to sequence and follow directions, bed mobility tech, and functional UE strength.      JOANNA Jennings

## 2023-03-07 NOTE — PROGRESS NOTES
Physical Therapy  Facility/Department: [facility]  Rehabilitation Physical Therapy Treatment Note    NAME: Raynald Cabot  : 1960 (60 y.o.)  MRN: [id number]  CODE STATUS: Full Code    Date of Service: 3/7/23       Restrictions:  Restrictions/Precautions: Fall Risk, Up as Tolerated, Bed Alarm, General Precautions  Position Activity Restriction  Other position/activity restrictions: MRDD     SUBJECTIVE  Subjective  Subjective: pt not agreeable to PT MAX encouragement needed for pt to finally agree to sitting up edge of bed               OBJECTIVE  Cognition  Overall Cognitive Status: Exceptions  Arousal/Alertness: Appropriate responses to stimuli  Following Commands: Inconsistently follows commands  Attention Span: Difficulty attending to directions  Memory: Decreased recall of biographical Information;Decreased recall of precautions;Decreased recall of recent events  Safety Judgement: Decreased awareness of need for safety;Decreased awareness of need for assistance  Problem Solving: Assistance required to generate solutions;Assistance required to implement solutions;Decreased awareness of errors;Assistance required to correct errors made  Insights: Decreased awareness of deficits  Initiation: Requires cues for all  Sequencing: Requires cues for all  Cognition Comment: MRDD; speech difficult to understand, pt easily agitated at times, can be redirected relatively easily. Orientation  Overall Orientation Status: Impaired  Orientation Level: Oriented to person;Disoriented to time;Disoriented to situation;Disoriented to place    Functional Mobility  Bed Mobility  Overall Assistance Level: Maximum Assistance; Requires x 2 Assistance  Roll Left  Assistance Level: Moderate assistance; Requires x 2 assistance  Roll Right  Assistance Level: Moderate assistance; Requires x 2 assistance  Sit to Supine  Assistance Level: Maximum assistance;Dependent; Requires x 2 assistance  Skilled Clinical Factors: MAX A x 4 required for bed mobility  Supine to Sit  Assistance Level: Maximum assistance;Dependent; Requires x 2 assistance  Scooting  Assistance Level: Maximum assistance;Dependent; Requires x 2 assistance  Transfers  Surface: From bed; To bed  Additional Factors: Verbal cues; Hand placement cues; With handrails; Set-up  Device:  (laine stedy)  Sit to Stand  Assistance Level: Maximum assistance;Dependent; Requires x 3-4 assistance  Skilled Clinical Factors: MAX A x 4 for attempted standing pt unable to stand upright enough to use the laine stedy functionally  Stand to Sit  Assistance Level: Maximum assistance;Dependent; Requires x 3 assistance  Bed To/From Chair  Technique: Stand pivot  Assistance Level: Maximum assistance;Dependent; Requires x 3 assistance                             ASSESSMENT/PROGRESS TOWARDS GOALS       Assessment  Assessment: Pt required MAx A 3-4 assist for participation and bed mobility , level of participation varies with fatigue and willingness to participate, pt relative easily agitated states she can move herself but is too weak to flollow through with movment on her own  Activity Tolerance: Treatment limited secondary to decreased cognition  Discharge Recommendations: Patient would benefit from continued therapy after discharge    Goals  Patient Goals   Patient Goals : Pt. states she knows she is going to a \"nursing home\"  Short Term Goals  Time Frame for Short Term Goals: 12 visits:  Short Term Goal 1: Pt. to requrie mod A for bed mob. Short Term Goal 2: Pt to requrie mod A x2 for sit to stand transfer with RW  Short Term Goal 3: Pt. to takes steps along EOB with RW, mod A x 2  Short Term Goal 4: Pt. to have good dynamic sitting balance  Short Term Goal 5: Pt. to tolerate 25+ min.  of PT daily for ther ex/ balance training/ functional mob. training    PLAN OF CARE/SAFETY  Physcial Therapy Plan  General Plan: 5-7 times per week  Current Treatment Recommendations: Strengthening;ROM;Balance training;Functional mobility training;Transfer training; Endurance training;Home exercise program;Safety education & training;Patient/Caregiver education & training; Therapeutic activities  Safety Devices  Type of Devices: Gait belt;Patient at risk for falls; Bed alarm in place;Call light within reach;Nurse notified; All fall risk precautions in place    EDUCATION  Education  Education Given To: Patient  Education Provided: Role of Therapy; ADL Function;Plan of Care;IADL Function;Home Exercise Program;Mobility Training;Precautions;Transfer Training; Safety; Energy Conservation  Education Method: Verbal  Barriers to Learning: Cognition  Education Outcome: Continued education needed    AM-PAC Score    AM-PAC Inpatient Mobility Raw Score : 7  AM-PAC Inpatient T-Scale Score : 26.42  Mobility Inpatient CMS 0-100% Score: 92.36  Mobility Inpatient CMS G-Code Modifier: CM        Therapy Time   cotx Concurrent Group Co-treatment   Time In 1321         Time Out 1346         Minutes 25                 Co-treatment with OT warranted secondary to decreased safety and independence requiring 2 skilled therapy professionals to address individual discipline's goals. PT addressing preparation for  Transfers, gait and pre gait activities,  sitting balance for increased  sitting/activity tolerance, functional mobility, environmental safety/scanning, fall prevention, functional mobility  transfers and functional LE strength. Upon writer exit, call light within reach, pt retired to bed. All lines intact and patient positioned comfortably. All patient needs addressed prior to ending therapy session. Chart reviewed prior to treatment and patient is agreeable for therapy. RN reports patient is medically stable for therapy treatment this date.        STAR BANKS, PTA, 03/07/23 at 2:25 PM

## 2023-03-07 NOTE — PROGRESS NOTES
End Of Shift Note  [facility]  Summary of shift: Pt had uneventful night. Complaints of headache and prn tylenol was given. Repositioned in bed. Miconazole powder applied. Purwick replaced. Plan for today is lumbar puncture    Vitals:    Vitals:    03/06/23 2000 03/07/23 0000 03/07/23 0356 03/07/23 0400   BP: (!) 105/54 115/61  130/66   Pulse: 81 79  82   Resp: 22 19 24   Temp: 98 °F (36.7 °C) 97.1 °F (36.2 °C)  98.3 °F (36.8 °C)   TempSrc: Axillary   Axillary   SpO2: 95% 96% 97% 96%   Weight:  262 lb (118.8 kg)     Height:            I&O:   Intake/Output Summary (Last 24 hours) at 3/7/2023 0643  Last data filed at 3/7/2023 0641  Gross per 24 hour   Intake 885.18 ml   Output 450 ml   Net 435.18 ml       Resp Status: 2L Nasal cannula.  C/D lung sounds    Ventilator Settings:     / / /FiO2 : 30 %    Critical Care IV infusions:   heparin (PORCINE) Infusion 12 Units/kg/hr (03/07/23 0641)    dextrose      dexmedetomidine (PRECEDEX) IV infusion Stopped (03/05/23 0308)    sodium chloride          LDA:   PICC Double Lumen 77/87/93 Right Basilic (Active)   Number of days: 1       Peripheral IV 02/28/23 Right Antecubital (Active)   Number of days: 6       Peripheral IV 03/04/23 Left Forearm (Active)   Number of days: 2       External Urinary Catheter (Active)   Number of days: 1

## 2023-03-07 NOTE — PROGRESS NOTES
Comprehensive Nutrition Assessment    Type and Reason for Visit:  RD Nutrition Re-Screen/LOS (Length of stay)    Nutrition Recommendations/Plan:   Continue ADULT DIET; Regular; Low Fat/Low Chol/High Fiber/HEIKE  Monitor p.o intakes and labs  Consider adding 4 carb per meal restriction to help with glucose control     Malnutrition Assessment:  Malnutrition Status: At risk for malnutrition (Comment) (03/07/23 3352)        Nutrition Assessment:    Patient admission is related to generalized weakness. Patient has a medical history for cerebral infarction, diabetes and hypertention. Patient assesssed for length of stay. Patient has been lethargic for several days. Elevated glucose, CRP and ERS are noted. Patient is status post lumbar puncture today for rule out inflammatory etiology. At time of visit patient was not in room due to procedure. Continue current diet and consider need for carbohydrate controlled diet. Monitor p.o intakes and need for oral nutrition supplements. Nutrition Related Findings:    Edema: +1 BUE, +1 BLE. Lethargic. S/P lumbar puncture (3/7) Wound Type: None       Current Nutrition Intake & Therapies:    Average Meal Intake: 51-75%  Average Supplements Intake: Unable to assess  ADULT DIET; Regular; Low Fat/Low Chol/High Fiber/HEIKE    Anthropometric Measures:  Height: 5' 11\" (180.3 cm)  Ideal Body Weight (IBW): 155 lbs (70 kg)       Current Body Weight: 262 lb (118.8 kg), 169 % IBW.  Weight Source: Bed Scale  Current BMI (kg/m2): 36.6                          BMI Categories: Obese Class 2 (BMI 35.0 -39.9)    Estimated Daily Nutrient Needs:  Energy Requirements Based On: Kcal/kg  Weight Used for Energy Requirements: Current  Energy (kcal/day): 8561-8870 kcal (15-18 kcal/kg)  Weight Used for Protein Requirements: Ideal  Protein (g/day):  gm of protein (1.2-1.5 gm/kg)       Nutrition Diagnosis:   Altered nutrition-related lab values related to endocrine dysfuntion as evidenced by lab values    Nutrition Interventions:   Food and/or Nutrient Delivery: Continue Current Diet  Nutrition Education/Counseling: Education not indicated  Coordination of Nutrition Care: Continue to monitor while inpatient       Goals:     Goals: PO intake 75% or greater       Nutrition Monitoring and Evaluation:      Food/Nutrient Intake Outcomes: Food and Nutrient Intake  Physical Signs/Symptoms Outcomes: Biochemical Data, Skin, Weight, Fluid Status or Edema    Discharge Planning:    Continue current diet           Olive MURCIAN, RDN, LDN  Lead Clinical Dietitian  RD Office Phone [phone]

## 2023-03-07 NOTE — PLAN OF CARE
Problem: Discharge Planning  Goal: Discharge to home or other facility with appropriate resources  Outcome: Progressing     Problem: Pain  Goal: Verbalizes/displays adequate comfort level or baseline comfort level  Outcome: Progressing     Problem: Confusion  Goal: Confusion, delirium, dementia, or psychosis is improved or at baseline  Description: INTERVENTIONS:  1. Assess for possible contributors to thought disturbance, including medications, impaired vision or hearing, underlying metabolic abnormalities, dehydration, psychiatric diagnoses, and notify attending LIP  2. [place] high risk fall precautions, as indicated  3. Provide frequent short contacts to provide reality reorientation, refocusing and direction  4. Decrease environmental stimuli, including noise as appropriate  5. Monitor and intervene to maintain adequate nutrition, hydration, elimination, sleep and activity  6. If unable to ensure safety without constant attention obtain sitter and review sitter guidelines with assigned personnel  7. Initiate Psychosocial CNS and Spiritual Care consult, as indicated  Outcome: Progressing     Problem: Skin/Tissue Integrity  Goal: Absence of new skin breakdown  Description: 1. Monitor for areas of redness and/or skin breakdown  2. Assess vascular access sites hourly  3. Every 4-6 hours minimum:  Change oxygen saturation probe site  4. Every 4-6 hours:  If on nasal continuous positive airway pressure, respiratory therapy assess nares and determine need for appliance change or resting period.   Outcome: Progressing     Problem: Safety - Adult  Goal: Free from fall injury  Outcome: Progressing     Problem: Chronic Conditions and Co-morbidities  Goal: Patient's chronic conditions and co-morbidity symptoms are monitored and maintained or improved  Outcome: Progressing     Problem: Neurosensory - Adult  Goal: Achieves stable or improved neurological status  Outcome: Progressing     Problem: Musculoskeletal - Adult  Goal: Return mobility to safest level of function  Outcome: Progressing     Problem: Respiratory - Adult  Goal: Achieves optimal ventilation and oxygenation  Outcome: Progressing

## 2023-03-07 NOTE — PLAN OF CARE
Problem: Discharge Planning  Goal: Discharge to home or other facility with appropriate resources  Outcome: Progressing     Problem: Pain  Goal: Verbalizes/displays adequate comfort level or baseline comfort level  Outcome: Progressing     Problem: Confusion  Goal: Confusion, delirium, dementia, or psychosis is improved or at baseline  Description: INTERVENTIONS:  1. Assess for possible contributors to thought disturbance, including medications, impaired vision or hearing, underlying metabolic abnormalities, dehydration, psychiatric diagnoses, and notify attending LIP  2. [place] high risk fall precautions, as indicated  3. Provide frequent short contacts to provide reality reorientation, refocusing and direction  4. Decrease environmental stimuli, including noise as appropriate  5. Monitor and intervene to maintain adequate nutrition, hydration, elimination, sleep and activity  6. If unable to ensure safety without constant attention obtain sitter and review sitter guidelines with assigned personnel  7. Initiate Psychosocial CNS and Spiritual Care consult, as indicated  Outcome: Progressing     Problem: Skin/Tissue Integrity  Goal: Absence of new skin breakdown  Description: 1. Monitor for areas of redness and/or skin breakdown  2. Assess vascular access sites hourly  3. Every 4-6 hours minimum:  Change oxygen saturation probe site  4. Every 4-6 hours:  If on nasal continuous positive airway pressure, respiratory therapy assess nares and determine need for appliance change or resting period.   Outcome: Progressing     Problem: Safety - Adult  Goal: Free from fall injury  Outcome: Progressing     Problem: Chronic Conditions and Co-morbidities  Goal: Patient's chronic conditions and co-morbidity symptoms are monitored and maintained or improved  Outcome: Progressing     Problem: Neurosensory - Adult  Goal: Achieves stable or improved neurological status  Outcome: Progressing     Problem: Musculoskeletal - Adult  Goal: Return mobility to safest level of function  Outcome: Progressing     Problem: Respiratory - Adult  Goal: Achieves optimal ventilation and oxygenation  3/6/2023 2251 by Osmar Fajardo RN  Outcome: Progressing  3/6/2023 1828 by Milus Plane, RCP  Outcome: Progressing  Goal: Able to breathe comfortably  3/6/2023 1828 by Milus Plane, RCP  Outcome: Progressing  Goal: Normal respiration  3/6/2023 1828 by Milus Plane, RCP  Outcome: Progressing  Goal: Adequate oxygenation  Description: Adequate oxygenation  3/6/2023 1828 by Milus Plane, RCP  Outcome: Progressing

## 2023-03-07 NOTE — PROGRESS NOTES
[facility] [place] ([facility]) Neurology Specialist  Glenny Solano [address]  [place], [address] [facility] 30:  [phone] or [phone]  FAX:  [phone]            Brief history: Antonio Whipple is a 60 y.o. old female admitted on 2/28/2023 with encephalopathy     Subjective: LP planned for today. ESR and CRP are elevated (CRP is decreased from several days ago). Discussed with family at bedside. Patient is more awake and alert today. Objective: /68   Pulse 86   Temp 97.6 °F (36.4 °C) (Temporal)   Resp 18   Ht 5' 11\" (1.803 m)   Wt 262 lb (118.8 kg)   SpO2 97%   BMI 36.54 kg/m²       Medications:    metoprolol succinate  75 mg Oral Daily    insulin lispro  0-8 Units SubCUTAneous TID WC    insulin lispro  0-4 Units SubCUTAneous Nightly    sodium chloride  80 mL IntraVENous Once    cefTRIAXone (ROCEPHIN) IV  1,000 mg IntraVENous Q24H    [Held by provider] aspirin  325 mg Oral Daily    miconazole   Topical 4x Daily    gabapentin  100 mg Oral TID    [Held by provider] apixaban  5 mg Oral BID    atorvastatin  20 mg Oral Nightly    dilTIAZem  120 mg Oral BID    fenofibrate  54 mg Oral Daily    furosemide  40 mg Oral Daily    glipiZIDE  2.5 mg Oral QAM AC    metFORMIN  500 mg Oral BID WC    QUEtiapine  25 mg Oral QPM    sertraline  25 mg Oral Daily    spironolactone  25 mg Oral Daily    sodium chloride flush  5-40 mL IntraVENous 2 times per day        Neurological examination:    Mental status   Awake. Speech dysarthric. Able to follow commands and answer questions like location, name and name objects. Cranial nerves   Face is symmetric. Extraocular movements appear intact. Pupils are round and reactive     Motor function  Spontaneous movement in both upper and lower extremities, globally weak   Sensory function Unable to assess   Cerebellar No visible tremors   Reflex function Intact 2+ DTR and symmetric.  Negative Babinski     Gait                  Not tested       Lab Results   Component Value Date LDLCHOLESTEROL 69 05/07/2014      No components found for: CHLPL   Lab Results   Component Value Date    TRIG 137 05/07/2014    TRIG 114 12/06/2012      Lab Results   Component Value Date    HDL 39 (L) 05/07/2014    HDL 44 12/06/2012      No results found for: LDLCALC   No results found for: LABVLDL   Lab Results   Component Value Date    LABA1C 7.8 (H) 03/06/2023      Lab Results   Component Value Date     03/06/2023      No results found for: Nita Hayes     Neurological work up:  Study Finding   CT head    CTA Head and neck Markedly abnormal.  Differential includes vasculitis/chronic dissection/FMD.     MRI brain  4/3/2021 Numerous foci in diffuse regions of periventricular white matter signal abnormalities with involvement of splenium of corpus callosum. Enhancement of subcortical region and periventricular white matter. Etiology unclear. Possible lacunar subacute strokes versus demyelinating disease financing lesions versus cerebritis. 2 D Echo    RAJESH    5 days video EEG monitoring  4/11/2021 Generalized slowing consistent with encephalopathy. Assessment and Recommendations:     Punctate bilateral subcortical ischemic strokes  MRI of the brain is consistent with multiple punctate bilateral ischemic strokes. Etiology could be secondary to fibromuscular dysplasia versus CNS vasculitis. Overnight events were reviewed. The patient neurologically unchanged. Oriented to self and able to follow one-step and two commands. Aspirin and Eliquis on hold for spinal tap for 3/7/2023. We will check for CSF glucose, protein, cell count, IgG index, myelin basic protein, encephalitis panel. Vasculitis panel is pending. ESR is elevated at 77 and CRP is elevated although has been downtrending from several days ago. Discussed with family at bedside. We will follow. Ashley Kim D.O.    Neurology    This note is created with the assistance of a speech-recognition program. While intending to generate a document that actually reflects the content of the visit, the document can still have some errors including those of syntax and sound a- like substitutions which may escape proofreading. In such instances, actual meaning can be extrapolated by contextual derivation.

## 2023-03-07 NOTE — PROGRESS NOTES
Pulmonary Critical Care Progress Note  Annabelle Anand MD     Patient seen for the follow up of  Weakness     Subjective:  She had uneventful night. She is much more awake and alert today. She sitting up in the bed no distress. She is on oxygen at 2 L saturating 98%. She denies any cough or chest pain. No significant shortness of breath. She complains of pain in her legs and her buttocks. Examination:  Vitals: /68   Pulse 86   Temp 97.6 °F (36.4 °C) (Temporal)   Resp 29   Ht 5' 11\" (1.803 m)   Wt 262 lb (118.8 kg)   SpO2 95%   BMI 36.54 kg/m²   General appearance: alert and cooperative with exam  Neck: No JVD  Lungs: Moderate air exchange, no wheezing or crackles anteriorly  Heart: regular rate and rhythm, S1, S2 normal, no gallop  Abdomen: Soft, non tender, + BS  Extremities: no cyanosis or clubbing.   Trace edema    LABs:  CBC:   Recent Labs     03/04/23  2306 03/05/23  1150 03/06/23  0515 03/07/23  0540   WBC 18.9* 12.4* 12.4* 11.0   HGB 12.0 10.3* 9.5* 9.0*   HCT 36.6 32.4* 30.2* 28.6*    130* 108* 114*     BMP:   Recent Labs     03/05/23  1150 03/06/23  0515 03/07/23  0540   * 130* 135   K 3.7 3.5* 3.4*   CO2 20 21 23   BUN 28* 28* 26*   CREATININE 0.88 0.85 0.87   LABGLOM >60 >60 >60   GLUCOSE 249* 168* 159*       APTT:  Recent Labs     03/04/23  1913 03/05/23  1150 03/05/23  2000 03/06/23  2340 03/07/23  0540   APTT 36.5* >150.0* >150.0* 67.3* 67.2*     ABG:  Lab Results   Component Value Date/Time    FIO2 35.0 03/05/2023 06:08 AM       Lab Results   Component Value Date/Time    POCPH 7.505 03/05/2023 06:08 AM    POCPCO2 22.3 03/05/2023 06:08 AM    POCPO2 117.3 03/05/2023 06:08 AM    POCHCO3 17.6 03/05/2023 06:08 AM    ZATD6JUS 99 03/05/2023 06:08 AM    FIO2 35.0 03/05/2023 06:08 AM     Radiology:  3/6/2023  No acute cardiopulmonary process      Impression:  Acute hypoxic respiratory failure  Pulmonary edema  SVT/A-fib/CHF/mild mitral stenosis  Suspected obstructive sleep apnea/Obesity  Acute microinfarcts caudate head and right thalamus on MRI brain  E. coli UTI  Weakness/inability to ambulate  Cognitive impairment/MRDD  A-fib, DM, HTN, HLD, PE, on Eliquis  Hyponatremia    Recommendations:  Oxygen via nasal cannula, keep SPO2 90% or greater, wean as able  BiPAP support as needed  Precedex drip if necessary  Diuresis with oral Lasix  Neurology following, spinal tap today to rule out inflammatory etiology for patient's symptoms  Vasculitis panel pending  Cardiology on consult  Rocephin for UTI  Labs: CBC and BMP in am  DVT prophylaxis, heparin drip, off Eliquis for lumbar puncture  PT/OT/increase activity  Discussed with RN  Will follow with you    Electronically signed by     Carolee Adams MD on 3/7/2023 at 12:25 PM  Pulmonary Critical Care and Sleep Medicine,  [facility]  Cell: [phone]  Office: [phone]

## 2023-03-08 LAB
ALBUMIN CSF: 376 MG/L (ref 70–350)
ALBUMIN INDEX: 163.5
ALBUMIN SERPL-MCNC: 2.3 G/DL (ref 3.5–5.2)
ANION GAP SERPL CALCULATED.3IONS-SCNC: 10 MMOL/L (ref 9–17)
ANTI-XA UNFRAC HEPARIN: 0.44 IU/L (ref 0.3–0.7)
BUN SERPL-MCNC: 20 MG/DL (ref 8–23)
BUN/CREAT BLD: 26 (ref 9–20)
CALCIUM SERPL-MCNC: 8.6 MG/DL (ref 8.6–10.4)
CHLORIDE SERPL-SCNC: 102 MMOL/L (ref 98–107)
CO2 SERPL-SCNC: 21 MMOL/L (ref 20–31)
CREAT SERPL-MCNC: 0.76 MG/DL (ref 0.5–0.9)
GFR SERPL CREATININE-BSD FRML MDRD: >60 ML/MIN/1.73M2
GLUCOSE BLD-MCNC: 126 MG/DL (ref 65–105)
GLUCOSE BLD-MCNC: 165 MG/DL (ref 65–105)
GLUCOSE BLD-MCNC: 165 MG/DL (ref 65–105)
GLUCOSE BLD-MCNC: 179 MG/DL (ref 65–105)
GLUCOSE BLD-MCNC: 188 MG/DL (ref 65–105)
GLUCOSE SERPL-MCNC: 160 MG/DL (ref 70–99)
IGG CSF: 11.1 MG/DL (ref 0.5–7)
IGG INDEX CSF: 0.86
IGG SYNTHESIS RATE CSF: 24.6 MG/24 H
IGG: 785 MG/DL (ref 700–1600)
OLIGOCLONAL BANDS: ABNORMAL
PARTIAL THROMBOPLASTIN TIME: 57.5 SEC (ref 23.9–33.8)
POTASSIUM SERPL-SCNC: 3.9 MMOL/L (ref 3.7–5.3)
SODIUM SERPL-SCNC: 133 MMOL/L (ref 135–144)

## 2023-03-08 PROCEDURE — 6370000000 HC RX 637 (ALT 250 FOR IP): Performed by: FAMILY MEDICINE

## 2023-03-08 PROCEDURE — 97535 SELF CARE MNGMENT TRAINING: CPT

## 2023-03-08 PROCEDURE — 97530 THERAPEUTIC ACTIVITIES: CPT

## 2023-03-08 PROCEDURE — 94761 N-INVAS EAR/PLS OXIMETRY MLT: CPT

## 2023-03-08 PROCEDURE — 6370000000 HC RX 637 (ALT 250 FOR IP): Performed by: NURSE PRACTITIONER

## 2023-03-08 PROCEDURE — 2580000003 HC RX 258: Performed by: FAMILY MEDICINE

## 2023-03-08 PROCEDURE — 2060000000 HC ICU INTERMEDIATE R&B

## 2023-03-08 PROCEDURE — 6360000002 HC RX W HCPCS: Performed by: NURSE PRACTITIONER

## 2023-03-08 PROCEDURE — 80048 BASIC METABOLIC PNL TOTAL CA: CPT

## 2023-03-08 PROCEDURE — 85520 HEPARIN ASSAY: CPT

## 2023-03-08 PROCEDURE — 2700000000 HC OXYGEN THERAPY PER DAY

## 2023-03-08 PROCEDURE — 6360000002 HC RX W HCPCS: Performed by: FAMILY MEDICINE

## 2023-03-08 PROCEDURE — 82947 ASSAY GLUCOSE BLOOD QUANT: CPT

## 2023-03-08 PROCEDURE — 36415 COLL VENOUS BLD VENIPUNCTURE: CPT

## 2023-03-08 PROCEDURE — 99233 SBSQ HOSP IP/OBS HIGH 50: CPT | Performed by: PSYCHIATRY & NEUROLOGY

## 2023-03-08 PROCEDURE — 85730 THROMBOPLASTIN TIME PARTIAL: CPT

## 2023-03-08 PROCEDURE — 2580000003 HC RX 258: Performed by: NURSE PRACTITIONER

## 2023-03-08 RX ORDER — HEPARIN SODIUM 10000 [USP'U]/100ML
5-30 INJECTION, SOLUTION INTRAVENOUS CONTINUOUS
Status: DISCONTINUED | OUTPATIENT
Start: 2023-03-08 | End: 2023-03-08

## 2023-03-08 RX ORDER — QUETIAPINE FUMARATE 25 MG/1
25 TABLET, FILM COATED ORAL EVERY EVENING
Qty: 10 TABLET | Refills: 0 | Status: SHIPPED | OUTPATIENT
Start: 2023-03-08

## 2023-03-08 RX ORDER — GABAPENTIN 100 MG/1
100 CAPSULE ORAL 3 TIMES DAILY
Qty: 7 CAPSULE | Refills: 0 | Status: SHIPPED | OUTPATIENT
Start: 2023-03-08 | End: 2023-03-11

## 2023-03-08 RX ADMIN — ACETAMINOPHEN 650 MG: 325 TABLET ORAL at 06:14

## 2023-03-08 RX ADMIN — SODIUM CHLORIDE, PRESERVATIVE FREE 10 ML: 5 INJECTION INTRAVENOUS at 20:38

## 2023-03-08 RX ADMIN — METFORMIN HYDROCHLORIDE 500 MG: 500 TABLET, FILM COATED ORAL at 17:58

## 2023-03-08 RX ADMIN — SODIUM CHLORIDE, PRESERVATIVE FREE 10 ML: 5 INJECTION INTRAVENOUS at 08:49

## 2023-03-08 RX ADMIN — GABAPENTIN 100 MG: 100 CAPSULE ORAL at 14:33

## 2023-03-08 RX ADMIN — QUETIAPINE FUMARATE 25 MG: 25 TABLET ORAL at 17:58

## 2023-03-08 RX ADMIN — FUROSEMIDE 40 MG: 40 TABLET ORAL at 08:47

## 2023-03-08 RX ADMIN — GABAPENTIN 100 MG: 100 CAPSULE ORAL at 08:47

## 2023-03-08 RX ADMIN — GLIPIZIDE 2.5 MG: 5 TABLET ORAL at 06:15

## 2023-03-08 RX ADMIN — METOPROLOL SUCCINATE 75 MG: 50 TABLET, EXTENDED RELEASE ORAL at 08:46

## 2023-03-08 RX ADMIN — HEPARIN SODIUM 12 UNITS/KG/HR: 10000 INJECTION, SOLUTION INTRAVENOUS at 07:08

## 2023-03-08 RX ADMIN — DILTIAZEM HYDROCHLORIDE 120 MG: 60 CAPSULE, EXTENDED RELEASE ORAL at 08:46

## 2023-03-08 RX ADMIN — SPIRONOLACTONE 25 MG: 25 TABLET ORAL at 08:47

## 2023-03-08 RX ADMIN — ATORVASTATIN CALCIUM 20 MG: 20 TABLET, FILM COATED ORAL at 20:30

## 2023-03-08 RX ADMIN — HEPARIN SODIUM 4610 UNITS: 1000 INJECTION INTRAVENOUS; SUBCUTANEOUS at 07:54

## 2023-03-08 RX ADMIN — APIXABAN 5 MG: 5 TABLET, FILM COATED ORAL at 20:30

## 2023-03-08 RX ADMIN — ACETAMINOPHEN 650 MG: 325 TABLET ORAL at 20:29

## 2023-03-08 RX ADMIN — SERTRALINE 25 MG: 25 TABLET, FILM COATED ORAL at 08:47

## 2023-03-08 RX ADMIN — GABAPENTIN 100 MG: 100 CAPSULE ORAL at 20:30

## 2023-03-08 RX ADMIN — ANTI-FUNGAL POWDER MICONAZOLE NITRATE TALC FREE: 1.42 POWDER TOPICAL at 17:59

## 2023-03-08 RX ADMIN — METFORMIN HYDROCHLORIDE 500 MG: 500 TABLET, FILM COATED ORAL at 08:47

## 2023-03-08 RX ADMIN — ANTI-FUNGAL POWDER MICONAZOLE NITRATE TALC FREE: 1.42 POWDER TOPICAL at 14:34

## 2023-03-08 RX ADMIN — FENOFIBRATE 54 MG: 54 TABLET ORAL at 08:47

## 2023-03-08 RX ADMIN — DILTIAZEM HYDROCHLORIDE 120 MG: 60 CAPSULE, EXTENDED RELEASE ORAL at 20:30

## 2023-03-08 RX ADMIN — ANTI-FUNGAL POWDER MICONAZOLE NITRATE TALC FREE: 1.42 POWDER TOPICAL at 08:46

## 2023-03-08 RX ADMIN — CEFTRIAXONE SODIUM 1000 MG: 1 INJECTION, POWDER, FOR SOLUTION INTRAMUSCULAR; INTRAVENOUS at 17:57

## 2023-03-08 ASSESSMENT — PAIN DESCRIPTION - DESCRIPTORS: DESCRIPTORS: ACHING

## 2023-03-08 ASSESSMENT — PAIN DESCRIPTION - LOCATION
LOCATION: HEAD

## 2023-03-08 ASSESSMENT — PAIN SCALES - GENERAL
PAINLEVEL_OUTOF10: 4
PAINLEVEL_OUTOF10: 3

## 2023-03-08 NOTE — PROGRESS NOTES
0000 notified Emmanuel, (resp. therapist) of rapid heart rate, he said the monitor was picking up double respirations when her stomach is moving with respirations.

## 2023-03-08 NOTE — PROGRESS NOTES
Changing monitoring of heparin infusion from aPTT to anti-Xa heparin as it is the gold standard, and last DOAC (apixaban) dose was > 72 hours (3/4 @ 0954). Will order next anti-Xa heparin for 14:00 today.

## 2023-03-08 NOTE — PROGRESS NOTES
Pulmonary Critical Care Progress Note  George Shi MD     Patient seen for the follow up of  Weakness     Subjective:  She had uneventful night. No significant overnight events noted. She is awake and alert and cooperative with exam.  She is on oxygen 2 L SPO2 97%. She denies shortness of breath, cough or chest pain. She had lumbar puncture done yesterday. Neurology signed off. She remains on heparin drip. Examination:  Vitals: /63   Pulse 82   Temp 97.2 °F (36.2 °C) (Temporal)   Resp 24   Ht 5' 11\" (1.803 m)   Wt 262 lb (118.8 kg)   SpO2 97%   BMI 36.54 kg/m²   General appearance: alert and cooperative with exam  Neck: No JVD  Lungs: Moderate air exchange, no wheezing or crackles anteriorly  Heart: regular rate and rhythm, S1, S2 normal, no gallop  Abdomen: Soft, non tender, + BS  Extremities: no cyanosis or clubbing.   Trace edema    LABs:  CBC:   Recent Labs     03/06/23  0515 03/07/23  0540   WBC 12.4* 11.0   HGB 9.5* 9.0*   HCT 30.2* 28.6*   * 114*     BMP:   Recent Labs     03/06/23  0515 03/07/23  0540 03/08/23  0640   * 135 133*   K 3.5* 3.4* 3.9   CO2 21 23 21   BUN 28* 26* 20   CREATININE 0.85 0.87 0.76   LABGLOM >60 >60 >60   GLUCOSE 168* 159* 160*       APTT:  Recent Labs     03/05/23 2000 03/06/23  2340 03/07/23  0540 03/07/23  2335 03/08/23  0640   APTT >150.0* 67.3* 67.2* 68.2* 57.5*     ABG:  Lab Results   Component Value Date/Time    FIO2 35.0 03/05/2023 06:08 AM       Lab Results   Component Value Date/Time    POCPH 7.505 03/05/2023 06:08 AM    POCPCO2 22.3 03/05/2023 06:08 AM    POCPO2 117.3 03/05/2023 06:08 AM    POCHCO3 17.6 03/05/2023 06:08 AM    HANO1CYS 99 03/05/2023 06:08 AM    FIO2 35.0 03/05/2023 06:08 AM     Radiology:  3/6/2023  No acute cardiopulmonary process      Impression:  Acute hypoxic respiratory failure  Pulmonary edema  SVT/A-fib/CHF/mild mitral stenosis  Suspected obstructive sleep apnea/Obesity  Acute microinfarcts caudate head and right thalamus on MRI brain  E. coli UTI  Weakness/inability to ambulate  Cognitive impairment/MRDD  A-fib, DM, HTN, HLD, PE, on Eliquis  Hyponatremia    Recommendations:  Oxygen via nasal cannula, keep SPO2 90% or greater, wean as able  BiPAP support as needed  Precedex drip if necessary  Diuresis with oral Lasix  Neurology following, spinal tap today to rule out inflammatory etiology for patient's symptoms  Vasculitis panel pending  Cardiology on consult  Rocephin for UTI  DVT prophylaxis, Eliquis   PT/OT/increase activity  Discharge planning back to rehab when all arrangements can be made. No objection from pulmonary standpoint.   Discussed with RN    Electronically signed by     Leyla Agarwal MD on 3/8/2023 at 4:01 PM  Pulmonary Critical Care and Sleep Medicine,  [facility]  Cell: [phone]  Office: [phone]

## 2023-03-08 NOTE — PLAN OF CARE
Problem: Discharge Planning  Goal: Discharge to home or other facility with appropriate resources  3/7/2023 2225 by Elana Calderon RN  Outcome: Progressing  Flowsheets (Taken 3/7/2023 2000)  Discharge to home or other facility with appropriate resources: Identify barriers to discharge with patient and caregiver  3/7/2023 1745 by Laxmi Esteves RN  Outcome: Progressing     Problem: Pain  Goal: Verbalizes/displays adequate comfort level or baseline comfort level  3/7/2023 2225 by Elana Calderon RN  Outcome: Progressing  3/7/2023 1745 by Laxmi Esteves RN  Outcome: Progressing     Problem: Confusion  Goal: Confusion, delirium, dementia, or psychosis is improved or at baseline  Description: INTERVENTIONS:  1. Assess for possible contributors to thought disturbance, including medications, impaired vision or hearing, underlying metabolic abnormalities, dehydration, psychiatric diagnoses, and notify attending LIP  2. [place] high risk fall precautions, as indicated  3. Provide frequent short contacts to provide reality reorientation, refocusing and direction  4. Decrease environmental stimuli, including noise as appropriate  5. Monitor and intervene to maintain adequate nutrition, hydration, elimination, sleep and activity  6. If unable to ensure safety without constant attention obtain sitter and review sitter guidelines with assigned personnel  7.  Initiate Psychosocial CNS and Spiritual Care consult, as indicated  3/7/2023 2225 by Elana Calderon RN  Outcome: Progressing  Flowsheets (Taken 3/7/2023 2000)  Effect of thought disturbance (confusion, delirium, dementia, or psychosis) are managed with adequate functional status: Assess for contributors to thought disturbance, including medications, impaired vision or hearing, underlying metabolic abnormalities, dehydration, psychiatric diagnoses, notify LIP  3/7/2023 1745 by Laxmi Esteves RN  Outcome: Progressing     Problem: Skin/Tissue Integrity  Goal: Absence of new skin breakdown  Description: 1. Monitor for areas of redness and/or skin breakdown  2. Assess vascular access sites hourly  3. Every 4-6 hours minimum:  Change oxygen saturation probe site  4. Every 4-6 hours:  If on nasal continuous positive airway pressure, respiratory therapy assess nares and determine need for appliance change or resting period.   3/7/2023 2225 by Beny Nguyễn RN  Outcome: Progressing  3/7/2023 1745 by Zach England RN  Outcome: Progressing     Problem: Safety - Adult  Goal: Free from fall injury  3/7/2023 2225 by Beny Nguyễn RN  Outcome: Progressing  3/7/2023 1745 by Zach England RN  Outcome: Progressing     Problem: Chronic Conditions and Co-morbidities  Goal: Patient's chronic conditions and co-morbidity symptoms are monitored and maintained or improved  3/7/2023 2225 by Beny Nguyễn RN  Outcome: Progressing  Flowsheets (Taken 3/7/2023 2000)  Care Plan - Patient's Chronic Conditions and Co-Morbidity Symptoms are Monitored and Maintained or Improved: Monitor and assess patient's chronic conditions and comorbid symptoms for stability, deterioration, or improvement  3/7/2023 1745 by Zach England RN  Outcome: Progressing     Problem: Neurosensory - Adult  Goal: Achieves stable or improved neurological status  3/7/2023 2225 by Beny Nguyễn RN  Outcome: Progressing  Flowsheets (Taken 3/7/2023 2000)  Achieves stable or improved neurological status: Assess for and report changes in neurological status  3/7/2023 1745 by Zach England RN  Outcome: Progressing     Problem: Musculoskeletal - Adult  Goal: Return mobility to safest level of function  3/7/2023 2225 by Beny Nguyễn RN  Outcome: Progressing  Flowsheets (Taken 3/7/2023 2000)  Return Mobility to Safest Level of Function: Assess patient stability and activity tolerance for standing, transferring and ambulating with or without assistive devices  3/7/2023 1745 by Zach England RN  Outcome: Progressing Problem: Respiratory - Adult  Goal: Achieves optimal ventilation and oxygenation  3/7/2023 2225 by Zenaida Viera RN  Outcome: Progressing  Flowsheets (Taken 3/7/2023 2000)  Achieves optimal ventilation and oxygenation: Assess for changes in respiratory status  3/7/2023 1745 by Mal Bumpers, RN  Outcome: Progressing     Problem: Nutrition Deficit:  Goal: Optimize nutritional status  Outcome: Progressing

## 2023-03-08 NOTE — PROGRESS NOTES
Physical Therapy  Facility/Department: [facility]  Rehabilitation Physical Therapy Treatment Note    NAME: Jorge A Burns  : 1960 (60 y.o.)  MRN: [id number]  CODE STATUS: Full Code    Date of Service: 3/8/23       Restrictions:  Restrictions/Precautions: Fall Risk, Up as Tolerated, Bed Alarm, General Precautions  Position Activity Restriction  Other position/activity restrictions: MRDD     SUBJECTIVE  Subjective  Subjective: pt required MAX encouragement to participate                 OBJECTIVE  Cognition  Overall Cognitive Status: Exceptions  Arousal/Alertness: Appropriate responses to stimuli  Following Commands: Inconsistently follows commands  Attention Span: Difficulty attending to directions  Memory: Decreased recall of biographical Information;Decreased recall of precautions;Decreased recall of recent events  Safety Judgement: Decreased awareness of need for safety;Decreased awareness of need for assistance  Problem Solving: Assistance required to generate solutions;Assistance required to implement solutions;Decreased awareness of errors;Assistance required to correct errors made  Insights: Decreased awareness of deficits  Initiation: Requires cues for all  Sequencing: Requires cues for all  Cognition Comment: MRDD; speech difficult to understand, pt easily agitated at times, can be redirected relatively easily. Orientation  Overall Orientation Status: Impaired  Orientation Level: Oriented to person;Disoriented to time;Disoriented to situation;Disoriented to place    Functional Mobility  Functional Mobility  Activity:  (Pt. repositioned in bed)  Skilled Clinical Factors: Max assist x2 to reposition self to upright position to eat breakfast.  Bed Mobility  Overall Assistance Level: Requires x 2 Assistance;Maximum Assistance  Additional Factors: With handrails; Increased time to complete;Verbal cues; Head of bed raised  Roll Left  Assistance Level: Maximum assistance; Requires x 2 assistance  Roll Right  Assistance Level: Maximum assistance; Requires x 2 assistance   PT Exercises  Exercise Treatment: Pt. declined B LE mobility/exercise at this time. ASSESSMENT/PROGRESS TOWARDS GOALS       Assessment  Assessment: Pt required MAX A 2 assist for participation and bed mobility , level of participation varies  Activity Tolerance: Treatment limited secondary to decreased cognition;Patient limited by fatigue  Discharge Recommendations: Patient would benefit from continued therapy after discharge    Goals  Patient Goals   Patient Goals : Pt. states she knows she is going to a \"nursing home\"  Short Term Goals  Time Frame for Short Term Goals: 12 visits:  Short Term Goal 1: Pt. to requrie mod A for bed mob. Short Term Goal 2: Pt to requrie mod A x2 for sit to stand transfer with RW  Short Term Goal 3: Pt. to takes steps along EOB with RW, mod A x 2  Short Term Goal 4: Pt. to have good dynamic sitting balance  Short Term Goal 5: Pt. to tolerate 25+ min. of PT daily for ther ex/ balance training/ functional mob. training    PLAN OF CARE/SAFETY  Physcial Therapy Plan  General Plan: 5-7 times per week  Current Treatment Recommendations: Strengthening;ROM;Balance training;Functional mobility training;Transfer training; Endurance training;Home exercise program;Safety education & training;Patient/Caregiver education & training; Therapeutic activities  Safety Devices  Type of Devices: Gait belt;Patient at risk for falls; Bed alarm in place;Call light within reach;Nurse notified; All fall risk precautions in place    EDUCATION  Education  Education Given To: Patient  Education Provided: Role of Therapy; ADL Function;Plan of Care;IADL Function;Home Exercise Program;Mobility Training;Precautions;Transfer Training; Safety; Energy Conservation  Education Method: Verbal  Barriers to Learning: Cognition  Education Outcome: Continued education needed      AM-PAC Score    AM-PAC Inpatient Mobility Raw Score : 9  AM-PAC Inpatient T-Scale Score : 30.55  Mobility Inpatient CMS 0-100% Score: 81.38  Mobility Inpatient CMS G-Code Modifier:CM          Therapy Time   cotx Concurrent Group Co-treatment   Time In 0901         Time Out 0914         Minutes 15                 Co-treatment with OT warranted secondary to decreased safety and independence requiring 2 skilled therapy professionals to address individual discipline's goals. PT addressing preparation for  Transfers, gait and pre gait activities,  sitting balance for increased  sitting/activity tolerance, functional mobility, environmental safety/scanning, fall prevention, functional mobility  transfers and functional LE strength. Upon writer exit, call light within reach, pt retired to bed. All lines intact and patient positioned comfortably. All patient needs addressed prior to ending therapy session. Chart reviewed prior to treatment and patient is agreeable for therapy. RN reports patient is medically stable for therapy treatment this date.        STAR BANKS, PTA, 03/08/23 at 1:44 PM

## 2023-03-08 NOTE — PROGRESS NOTES
[facility] ([facility]) Neurology Specialist  Glenny Solano [address]  [place], [address] [facility] 30:  [phone] or [phone]  FAX:  [phone]            Brief history: Akua Morales is a 60 y.o. old female admitted on 2/28/2023 with encephalopathy     Subjective: LP planned for today. ESR and CRP are elevated (CRP is decreased from several days ago). Discussed with family at bedside. Patient is more awake and alert today. Objective: /63   Pulse 82   Temp 97.2 °F (36.2 °C) (Temporal)   Resp 24   Ht 5' 11\" (1.803 m)   Wt 262 lb (118.8 kg)   SpO2 97%   BMI 36.54 kg/m²       Medications:    metoprolol succinate  75 mg Oral Daily    insulin lispro  0-8 Units SubCUTAneous TID WC    insulin lispro  0-4 Units SubCUTAneous Nightly    sodium chloride  80 mL IntraVENous Once    cefTRIAXone (ROCEPHIN) IV  1,000 mg IntraVENous Q24H    [Held by provider] aspirin  325 mg Oral Daily    miconazole   Topical 4x Daily    gabapentin  100 mg Oral TID    [Held by provider] apixaban  5 mg Oral BID    atorvastatin  20 mg Oral Nightly    dilTIAZem  120 mg Oral BID    fenofibrate  54 mg Oral Daily    furosemide  40 mg Oral Daily    glipiZIDE  2.5 mg Oral QAM AC    metFORMIN  500 mg Oral BID WC    QUEtiapine  25 mg Oral QPM    sertraline  25 mg Oral Daily    spironolactone  25 mg Oral Daily    sodium chloride flush  5-40 mL IntraVENous 2 times per day        Neurological examination:    Mental status   Awake. Speech dysarthric. Able to follow commands and answer questions like location, name and name objects. Cranial nerves   Face is symmetric. Extraocular movements appear intact. Pupils are round and reactive     Motor function  Spontaneous movement in both upper and lower extremities, globally weak   Sensory function Unable to assess   Cerebellar No visible tremors   Reflex function Intact 2+ DTR and symmetric.  Negative Babinski     Gait                  Not tested       Lab Results   Component Value Date LDLCHOLESTEROL 69 05/07/2014      No components found for: CHLPL   Lab Results   Component Value Date    TRIG 137 05/07/2014    TRIG 114 12/06/2012      Lab Results   Component Value Date    HDL 39 (L) 05/07/2014    HDL 44 12/06/2012      No results found for: LDLCALC   No results found for: LABVLDL   Lab Results   Component Value Date    LABA1C 7.8 (H) 03/06/2023      Lab Results   Component Value Date     03/06/2023      No results found for: Jagjit Barnes     Neurological work up:  Study Finding   CT head    CTA Head and neck Markedly abnormal.  Differential includes vasculitis/chronic dissection/FMD.     MRI brain  4/3/2021 Numerous foci in diffuse regions of periventricular white matter signal abnormalities with involvement of splenium of corpus callosum. Enhancement of subcortical region and periventricular white matter. Etiology unclear. Possible lacunar subacute strokes versus demyelinating disease financing lesions versus cerebritis. 2 D Echo    RAJESH    5 days video EEG monitoring  4/11/2021 Generalized slowing consistent with encephalopathy. Assessment and Recommendations:     Punctate bilateral subcortical ischemic strokes  MRI of the brain is consistent with multiple punctate bilateral ischemic strokes. Etiology could be secondary to fibromuscular dysplasia versus CNS vasculitis. S/p LP- WBC 10, RBC 38, cultures negative. Protein is at 80 and glucose is at 82. Given that protein is borderline elevated and patient is slowly improving in regards to mentation daily will hold off on steroids as this is not overtly clinically concerning for CNS vasculitis. However if patient does deteriorate, may consider trialing steroids. Aspirin and Eliquis on hold for spinal tap for 3/7/2023. Will need to resume once cleared by multidisciplinary team.     Discussed with family and nursing at bedside. Please call with questions. Will likely need placement/ rehab.      Susie Figueroa D.O. Neurology    This note is created with the assistance of a speech-recognition program. While intending to generate a document that actually reflects the content of the visit, the document can still have some errors including those of syntax and sound a- like substitutions which may escape proofreading. In such instances, actual meaning can be extrapolated by contextual derivation.

## 2023-03-08 NOTE — PROGRESS NOTES
[address] Hospitalist        3/8/2023   4:05 PM    Name:  Dena Joy  MRN:    [id number]     [place]:     [de-identified]   Room:  2029/2029-01k  IP Day: 8     Admit Date: 2/28/2023  5:51 PM  PCP: Abdi Brown MD    C/C:   Chief Complaint   Patient presents with    Fatigue     Unable to walk or stand. Discharged from TT today       Assessment:        Generalized weakness  Paroxysmal SVT  Atrial fibrillation with controlled rate  Chronic diastolic congestive heart failure  Hyponatremia  Pulmonary edema  Acute hypoxic respiratory failure   Mild mitral stenosis  Essential hypertension  Diabetes mellitus type 2  Hyperlipidemia  Cognitive impairment/ developmental delay   Morbid obesity      Plan:        Patient is on progressive level  O2 maintain oxygen saturation greater than 92%  BiPAP as needed  Precedex GTT    Oral Lasix  Off IV fluids  X-ray chest in a.m. Cardiology on board  Pulmonology on board      MRI of the brain is consistent with multiple punctate bilateral ischemic strokes. Etiology could be secondary to fibromuscular dysplasia versus CNS vasculitis  Aspirin and Eliquis on hold for spinal tap for 3/7/2023, will resume today  S/p LP on 3/7/2023  Meningitis panel negative  Vasculitis panel and CRP are pending. ESR is elevated at 77.   Neurology on board      DVT and GI prophylaxis  Continue to monitor/telemetry/CBC with differential daily/BMP daily  Continue medications as below    Discussed with bedside RN    Scheduled Meds:   metoprolol succinate  75 mg Oral Daily    insulin lispro  0-8 Units SubCUTAneous TID WC    insulin lispro  0-4 Units SubCUTAneous Nightly    sodium chloride  80 mL IntraVENous Once    cefTRIAXone (ROCEPHIN) IV  1,000 mg IntraVENous Q24H    aspirin  325 mg Oral Daily    miconazole   Topical 4x Daily    gabapentin  100 mg Oral TID    apixaban  5 mg Oral BID    atorvastatin  20 mg Oral Nightly    dilTIAZem  120 mg Oral BID    fenofibrate  54 mg Oral Daily furosemide  40 mg Oral Daily    glipiZIDE  2.5 mg Oral QAM AC    metFORMIN  500 mg Oral BID WC    QUEtiapine  25 mg Oral QPM    sertraline  25 mg Oral Daily    spironolactone  25 mg Oral Daily    sodium chloride flush  5-40 mL IntraVENous 2 times per day     Continuous Infusions:   dextrose      dexmedetomidine (PRECEDEX) IV infusion Stopped (23 0308)    sodium chloride       PRN Meds:  glucose, 4 tablet, PRN  dextrose bolus, 125 mL, PRN   Or  dextrose bolus, 250 mL, PRN  glucagon (rDNA), 1 mg, PRN  dextrose, , Continuous PRN  acetaminophen, 650 mg, Q4H PRN  sodium chloride flush, 10 mL, PRN  sodium chloride, , PRN  potassium chloride, 40 mEq, PRN   Or  potassium alternative oral replacement, 40 mEq, PRN   Or  potassium chloride, 10 mEq, PRN  magnesium sulfate, 1,000 mg, PRN  ondansetron, 4 mg, Q8H PRN   Or  ondansetron, 4 mg, Q6H PRN  polyethylene glycol, 17 g, Daily PRN          Subjective:     Patient seen and examined at bedside. Patient has been complaining of some back pain. No acute events reported  Afebrile  Pt. Denies any CP, SOB, palpitation, HA, dizziness, chills, cough, cold, changes in urination, BM or skin changes. ROS:  A 10 point system reviewed and negative otherwise mentioned above. Physical Examination:      Vitals:  /63   Pulse 82   Temp 98 °F (36.7 °C) (Temporal)   Resp 24   Ht 5' 11\" (1.803 m)   Wt 262 lb (118.8 kg)   SpO2 97%   BMI 36.54 kg/m²   Temp (24hrs), Av.8 °F (36.6 °C), Min:97.2 °F (36.2 °C), Max:98.6 °F (37 °C)    Weight:   Wt Readings from Last 3 Encounters:   23 262 lb (118.8 kg)   23 249 lb (112.9 kg)   23 222 lb 14.4 oz (101.1 kg)     I/O last 3 completed shifts:  I/O last 3 completed shifts:   In: 885.2 [I.V.:885.2]  Out: 3184 [Urine:1350]     Recent Labs     23  1958 23  0001 23  0733 23  1151   POCGLU 137* 126* 179* 165*           General appearance - alert, well appearing, and in no acute distress  Mental status - oriented to person, place, and time with normal affect  Head - normocephalic and atraumatic  Eyes - pupils equal and reactive, extraocular eye movements intact, conjunctiva clear  Ears - hearing appears to be intact  Nose - no drainage noted  Mouth - mucous membranes moist  Neck - supple, no carotid bruits, thyroid not palpable  Chest - clear to auscultation, normal effort  Heart - normal rate, regular rhythm, no murmur  Abdomen - soft, nontender, nondistended, bowel sounds present all four quadrants, no masses, hepatomegaly or splenomegaly  Neurological - normal speech, no focal findings or movement disorder noted, cranial nerves II through XII grossly intact  Extremities - peripheral pulses palpable, no pedal edema or calf pain with palpation  Skin - no gross lesions, rashes, or induration noted        Medications:      Allergies: No Known Allergies    Current Meds:   Current Facility-Administered Medications:     metoprolol succinate (TOPROL XL) extended release tablet 75 mg, 75 mg, Oral, Daily, VANESSA Seymour - CNP, 75 mg at 03/08/23 0846    glucose chewable tablet 16 g, 4 tablet, Oral, PRN, Quentin Bustos MD    dextrose bolus 10% 125 mL, 125 mL, IntraVENous, PRN **OR** dextrose bolus 10% 250 mL, 250 mL, IntraVENous, PRN, Quentin Bustos MD    glucagon (rDNA) injection 1 mg, 1 mg, SubCUTAneous, PRN, Quentin Bustos MD    dextrose 10 % infusion, , IntraVENous, Continuous PRN, Quentin Bustos MD    insulin lispro (HUMALOG) injection vial 0-8 Units, 0-8 Units, SubCUTAneous, TID WC, Quentin Bustos MD, 2 Units at 03/06/23 1840    insulin lispro (HUMALOG) injection vial 0-4 Units, 0-4 Units, SubCUTAneous, Nightly, Quentin Bustos MD    0.9 % sodium chloride bolus, 80 mL, IntraVENous, Once, Sonido Arciniega MD    cefTRIAXone (ROCEPHIN) 1,000 mg in sodium chloride 0.9 % 50 mL IVPB (mini-bag), 1,000 mg, IntraVENous, Q24H, Quentin Bustos MD, Stopped at 03/07/23 1834    dexmedetomidine (PRECEDEX) 400 mcg in sodium chloride 0.9 % 100 mL infusion, 0.1-1.5 mcg/kg/hr, IntraVENous, Continuous, Emeka Linton MD, Held at 03/05/23 0308    aspirin tablet 325 mg, 325 mg, Oral, Daily, Quentin Bustos MD, 325 mg at 03/04/23 0954    acetaminophen (TYLENOL) tablet 650 mg, 650 mg, Oral, Q4H PRN, Quentin Bustos MD, 650 mg at 03/08/23 0614    miconazole (MICOTIN) 2 % powder, , Topical, 4x Daily, Giovany Dickerson MD, Given at 03/08/23 1434    gabapentin (NEURONTIN) capsule 100 mg, 100 mg, Oral, TID, Giovany Dickerson MD, 100 mg at 03/08/23 1433    apixaban (ELIQUIS) tablet 5 mg, 5 mg, Oral, BID, Quentin Bustos MD, 5 mg at 03/04/23 0954    atorvastatin (LIPITOR) tablet 20 mg, 20 mg, Oral, Nightly, Quentin Bustos MD, 20 mg at 03/07/23 2028    dilTIAZem (CARDIZEM 12 HR) extended release capsule 120 mg, 120 mg, Oral, BID, Quentin Bustos MD, 120 mg at 03/08/23 0846    fenofibrate (TRICOR) tablet 54 mg, 54 mg, Oral, Daily, Quentin Bustos MD, 54 mg at 03/08/23 0847    furosemide (LASIX) tablet 40 mg, 40 mg, Oral, Daily, Joshua Bustos MD, 40 mg at 03/08/23 0847    glipiZIDE (GLUCOTROL) tablet 2.5 mg, 2.5 mg, Oral, QAM AC, Quentin Bustos MD, 2.5 mg at 03/08/23 0615    metFORMIN (GLUCOPHAGE) tablet 500 mg, 500 mg, Oral, BID WC, Quentin Bustos MD, 500 mg at 03/08/23 0847    QUEtiapine (SEROQUEL) tablet 25 mg, 25 mg, Oral, QPM, Giovany Dickerson MD, 25 mg at 03/07/23 1804    sertraline (ZOLOFT) tablet 25 mg, 25 mg, Oral, Daily, Quentin Bustos MD, 25 mg at 03/08/23 0847    spironolactone (ALDACTONE) tablet 25 mg, 25 mg, Oral, Daily, Giovany Dickerson MD, 25 mg at 03/08/23 0847    sodium chloride flush 0.9 % injection 5-40 mL, 5-40 mL, IntraVENous, 2 times per day, Mitra A Lump, APRN - CNP, 10 mL at 03/08/23 0849    sodium chloride flush 0.9 % injection 10 mL, 10 mL, IntraVENous, PRN, Mitra A Lump, APRN - CNP, 10 mL at 03/04/23 2156    0.9 % sodium chloride infusion, , IntraVENous, PRN, Ame [place] Lump, APRN - CNP    potassium chloride (Danyell RODRIGUEZ) extended release tablet 40 mEq, 40 mEq, Oral, PRN, 40 mEq at 03/07/23 0627 **OR** potassium bicarb-citric acid (EFFER-K) effervescent tablet 40 mEq, 40 mEq, Oral, PRN **OR** potassium chloride 10 mEq/100 mL IVPB (Peripheral Line), 10 mEq, IntraVENous, PRN, Mitra A Lump, APRN - CNP    magnesium sulfate 1000 mg in dextrose 5% 100 mL IVPB, 1,000 mg, IntraVENous, PRN, Mitra A Lump, APRN - CNP, Stopped at 03/06/23 0932    ondansetron (ZOFRAN-ODT) disintegrating tablet 4 mg, 4 mg, Oral, Q8H PRN **OR** ondansetron (ZOFRAN) injection 4 mg, 4 mg, IntraVENous, Q6H PRN, Mitra A Lump, APRN - CNP, 4 mg at 03/05/23 0701    polyethylene glycol (GLYCOLAX) packet 17 g, 17 g, Oral, Daily PRN, Mitra A Lump, APRN - CNP      I/O (24Hr):     Intake/Output Summary (Last 24 hours) at 3/8/2023 1605  Last data filed at 3/8/2023 0200  Gross per 24 hour   Intake --   Output 900 ml   Net -900 ml         Data:           Labs:    Hematology:  Recent Labs     03/06/23 0008 03/06/23  0055 03/06/23  0515 03/07/23  0540   WBC  --   --  12.4* 11.0   RBC  --   --  3.52* 3.31*   HGB  --   --  9.5* 9.0*   HCT  --   --  30.2* 28.6*   MCV  --   --  85.8 86.4   MCH  --   --  27.0 27.2   MCHC  --   --  31.5 31.5   RDW  --   --  14.9* 14.8*   PLT  --   --  108* 114*   MPV  --   --  11.3 11.7   SEDRATE  --  77*  --   --    CRP 58.7*  --   --   --        Chemistry:  Recent Labs     03/05/23 2020 03/06/23  0008 03/06/23  0206 03/06/23  0406 03/06/23  0515 03/06/23  0710 03/07/23  0540 03/08/23 0640   NA  --   --   --   --  130*  --  135 133*   K  --   --   --   --  3.5*  --  3.4* 3.9   CL  --   --   --   --  101  --  105 102   CO2  --   --   --   --  21  --  23 21   GLUCOSE  --   --   --   --  168*  --  159* 160*   BUN  --   --   --   --  28*  --  26* 20   CREATININE  --   --   --   --  0.85  --  0.87 0.76   MG  --   --   --   --  1.6  --  1.9  --    ANIONGAP  --   --   --   --  8*  --  7* 10   LABGLOM  --   --   --   --  >60  --  >60 >60   CALCIUM  --   --   -- --  7.6*  --  8.0* 8.6   TROPHS 173* 197* 203* 214*  --  201*  --   --    MYOGLOBIN 27 25 27  --   --   --   --   --        Recent Labs     03/06/23  0515 03/06/23  0823 03/07/23  1206 03/07/23  1552 03/07/23  1621 03/07/23  1958 03/08/23  0001 03/08/23  0733 03/08/23  1151   LABALBU  --   --   --  2.3*  --   --   --   --   --    LABA1C 7.8*  --   --   --   --   --   --   --   --    POCGLU  --    < > 198*  --  117* 137* 126* 179* 165*    < > = values in this interval not displayed. Lab Results   Component Value Date/Time    SPECIAL NOT REPORTED 08/29/2012 06:42 PM     Lab Results   Component Value Date/Time    CULTURE NO GROWTH 9 HOURS 03/07/2023 03:52 PM       Lab Results   Component Value Date/Time    POCPH 7.505 03/05/2023 06:08 AM    POCPCO2 22.3 03/05/2023 06:08 AM    POCPO2 117.3 03/05/2023 06:08 AM    POCHCO3 17.6 03/05/2023 06:08 AM    NBEA 4 03/05/2023 06:08 AM    ZKPU6IJA 99 03/05/2023 06:08 AM    FIO2 35.0 03/05/2023 06:08 AM       Radiology:    XR LUMBAR SPINE (2-3 VIEWS)    Result Date: 3/3/2023  No evidence of fracture, compression or dislocation in the lumbar spine or in visualized upper sacrum. At L5-S1 level, severe degenerative disc disease and mild-to-moderate facet osteoarthritis. At L4-L5 level severe degenerative disc disease and moderate facet osteoarthritis. At L3-L4 level moderate degenerative disc disease and moderate facet osteoarthritis. CT HEAD WO CONTRAST    Result Date: 3/3/2023  Multiple remote infarcts without acute intracranial abnormality. XR CHEST PORTABLE    Result Date: 3/6/2023  No acute process. Right-sided PICC terminates in the right atrium     XR CHEST PORTABLE    Result Date: 3/5/2023  Linear infiltrates seen in both lungs persist suggesting pulmonary edema     XR CHEST PORTABLE    Result Date: 3/3/2023  Findings suggest congestive heart failure     XR CHEST 1 VIEW    Result Date: 3/4/2023  Worsening pattern of pulmonary edema.      CTA HEAD NECK W CONTRAST    Result Date: 3/4/2023  CT Brain: 1. Punctate acute infarcts seen on recent MRI are not well appreciated on CT modality. 2. Otherwise, no acute intracranial abnormality. 3. Chronic encephalomalacia/gliosis present the bilateral parietal lobes and right temporal lobe. CTA Head/Neck: Limited evaluation secondary to poor contrast bolus/imaging timing, motion degradation, and patient positioning. 1. Diffusely irregular and likely severely stenotic bilateral cervical and intracranial ICAs. Finding may in part be related to an underlying angiopathy such as fibromuscular dysplasia or chronic dissection/thrombosis. Aforementioned limitations limit evaluation but findings are likely similar to 04/09/2021 CTA. Can consider repeat evaluation if clinically indicated. 2. Otherwise, no evidence of large vessel occlusion or significant stenosis in the remaining major arteries of the head and neck. 3. Prominent appearance of the partially visualized pulmonary artery. Finding may be related to pulmonary arterial hypertension. MRI BRAIN W WO CONTRAST    Result Date: 3/3/2023  Acute microinfarcts within the caudate heads and right thalamus. The findings were sent to the Radiology Results [address] at 5:30 pm on 3/3/2023 to be communicated to a licensed caregiver. All radiological studies reviewed  Code Status:  Full Code        Electronically signed by Saturnino Grewal MD on 3/8/2023 at 4:05 PM    This note was created with the assistance of a speech-recognition program.  Although the intention is to generate a document that actually reflects the content of the visit, no guarantees can be provided that every mistake has been identified and corrected by editing. Note was updated later by me after  physical examination and  completion of the assessment.

## 2023-03-08 NOTE — PLAN OF CARE
Problem: Discharge Planning  Goal: Discharge to home or other facility with appropriate resources  Outcome: Progressing     Problem: Pain  Goal: Verbalizes/displays adequate comfort level or baseline comfort level  Outcome: Progressing     Problem: Confusion  Goal: Confusion, delirium, dementia, or psychosis is improved or at baseline  Description: INTERVENTIONS:  1. Assess for possible contributors to thought disturbance, including medications, impaired vision or hearing, underlying metabolic abnormalities, dehydration, psychiatric diagnoses, and notify attending LIP  2. [place] high risk fall precautions, as indicated  3. Provide frequent short contacts to provide reality reorientation, refocusing and direction  4. Decrease environmental stimuli, including noise as appropriate  5. Monitor and intervene to maintain adequate nutrition, hydration, elimination, sleep and activity  6. If unable to ensure safety without constant attention obtain sitter and review sitter guidelines with assigned personnel  7. Initiate Psychosocial CNS and Spiritual Care consult, as indicated  Outcome: Progressing     Problem: Skin/Tissue Integrity  Goal: Absence of new skin breakdown  Description: 1. Monitor for areas of redness and/or skin breakdown  2. Assess vascular access sites hourly  3. Every 4-6 hours minimum:  Change oxygen saturation probe site  4. Every 4-6 hours:  If on nasal continuous positive airway pressure, respiratory therapy assess nares and determine need for appliance change or resting period.   Outcome: Progressing     Problem: Safety - Adult  Goal: Free from fall injury  Outcome: Progressing     Problem: Chronic Conditions and Co-morbidities  Goal: Patient's chronic conditions and co-morbidity symptoms are monitored and maintained or improved  Outcome: Progressing     Problem: Neurosensory - Adult  Goal: Achieves stable or improved neurological status  Outcome: Progressing     Problem: Musculoskeletal - Adult  Goal: Return mobility to safest level of function  Outcome: Progressing     Problem: Respiratory - Adult  Goal: Achieves optimal ventilation and oxygenation  Outcome: Progressing     Problem: Nutrition Deficit:  Goal: Optimize nutritional status  Outcome: Progressing

## 2023-03-08 NOTE — PROGRESS NOTES
End Of Shift Note  [address] CVICU  Summary of shift: Pt had Heparin gtt turned off, will restart Eliquis and Aspirin. Pt has been eating on own x3 meals today, awake and alert. Will most likely d/c to [address] tomorrow.  Good UO this shift 800ml    Vitals:    Vitals:    03/08/23 1500 03/08/23 1600 03/08/23 1700 03/08/23 1800   BP: 111/63 114/62 125/75 120/65   Pulse: 81 83 87 82   Resp: 21 20 24 23   Temp:  98 °F (36.7 °C)     TempSrc:  Temporal     SpO2:   94%    Weight:       Height:            I&O:   Intake/Output Summary (Last 24 hours) at 3/8/2023 1819  Last data filed at 3/8/2023 1600  Gross per 24 hour   Intake --   Output 1300 ml   Net -1300 ml       Resp Status: RA    Ventilator Settings:     / / /FiO2 : 30 %    Critical Care IV infusions:   dextrose      dexmedetomidine (PRECEDEX) IV infusion Stopped (03/05/23 0308)    sodium chloride          LDA:   PICC Double Lumen 84/72/99 Right Basilic (Active)   Number of days: 3       Peripheral IV 02/28/23 Right Antecubital (Active)   Number of days: 7       Peripheral IV 03/04/23 Left Forearm (Active)   Number of days: 4       External Urinary Catheter (Active)   Number of days: 3

## 2023-03-08 NOTE — CARE COORDINATION
Social Work-Contacted [facility]. They anticipate a bed for pt.at [facility]

## 2023-03-08 NOTE — PROGRESS NOTES
2000 Spoke with Declan Weaver RN, per phone call to clarify Heparin gtt and when next APTT is due, Per Declan Weaver RN Heparin was restarted at [phone] on 3/7/23. Writer placed order for APTT at 1130 pm.  Heparin is running at 12 units.

## 2023-03-08 NOTE — PROGRESS NOTES
0630 perfect [facility] [employer] regarding heparin order, heparin reordered at 12 units and to continue heparin as previously ordered.

## 2023-03-08 NOTE — PROGRESS NOTES
Occupational Therapy  Facility/Department: [facility]  Rehabilitation Occupational Therapy Daily Treatment Note    Date: 3/8/23  Patient Name: Isabelle Peacock       Room:   MRN: [id number]  Account: [de-identified]   : 1960  (63 y.o.) Gender: female   Past Medical History:  has a past medical history of Cerebral artery occlusion with cerebral infarction ([facility] [address] Diabetes mellitus ([facility] [address] HTN (hypertension), Mental disability, Pulmonary emboli ([facility] [address] and SVT (supraventricular tachycardia) ([facility] [address] Past Surgical History:   has a past surgical history that includes IR INSERT PICC VAD W SQ PORT >5 YEARS (3/5/2023). Restrictions  Restrictions/Precautions: Fall Risk, Up as Tolerated, Bed Alarm, General Precautions  Other position/activity restrictions: MRDD  Required Braces or Orthoses?: No    Subjective  Subjective: Pt. resting supine in bed and requested assistance to eat meal upon entry to room. Restrictions/Precautions: Fall Risk;Up as Tolerated; Bed Alarm;General Precautions  Objective     Cognition  Overall Cognitive Status: Exceptions  Arousal/Alertness: Appropriate responses to stimuli  Following Commands: Inconsistently follows commands  Attention Span: Difficulty attending to directions  Memory: Decreased recall of biographical Information;Decreased recall of precautions;Decreased recall of recent events  Safety Judgement: Decreased awareness of need for safety;Decreased awareness of need for assistance  Problem Solving: Assistance required to generate solutions;Assistance required to implement solutions;Decreased awareness of errors;Assistance required to correct errors made  Insights: Decreased awareness of deficits  Initiation: Requires cues for all  Sequencing: Requires cues for all  Cognition Comment: MRDD; speech difficult to understand, pt easily agitated at times, can be redirected relatively easily.   Orientation  Overall Orientation Status: Impaired  Orientation Level: Oriented to person;Disoriented to time;Disoriented to situation;Disoriented to place         ADL  Feeding  Assistance Level: Minimal assistance  Skilled Clinical Factors: Min assist to cut food up and orient pt to tray. Max assist to remove lids and initiate feeding task while sitting upright in bed. Pt. able to carry loaded fork to mouth without spillage. Pt. able to drink OJ from small cup without spillage after handed glass. Grooming/Oral Hygiene  Assistance Level: Moderate assistance  Skilled Clinical Factors: Mod assist to wash face after breakfast for thoroughness. Toileting  Skilled Clinical Factors: Purewick in place. No needs at this time. Functional Mobility  Activity:  (Pt. repositioned in bed)  Skilled Clinical Factors: Max assist x2 to reposition self to upright position to eat breakfast.  Bed Mobility  Overall Assistance Level: Requires x 2 Assistance;Maximum Assistance  Additional Factors: With handrails; Increased time to complete;Verbal cues; Head of bed raised  Roll Left  Assistance Level: Maximum assistance; Requires x 2 assistance  Roll Right  Assistance Level: Maximum assistance; Requires x 2 assistance   OT Exercises  Exercise Treatment: Pt. declined B UE mobility/exercise at this time. Assessment  Assessment  Assessment: Pt. repositioned in bed with max assist x2. Pt. encouraged to assist with use of grab bars requiring max physical cues to assist. Pt. did take several bites of breakfast before deciding she was finished and wanted to rest from fatigue. Pt. displayed poor endurance and overall weakness for selfcare skills. Skilled OT warranted to promote I/safety to return pt to prior living arrangement with assist as needed. Activity Tolerance: Treatment limited secondary to decreased cognition;Patient limited by fatigue  Discharge Recommendations: Patient would benefit from continued therapy after discharge  Safety Devices  Type of devices: Nurse notified;Call light within reach; Left in bed;Patient at risk for falls; Bed alarm in place;Gait belt; All fall risk precautions in place  Restraints  Initially in place: No    Patient Education  Education  Education Given To: Patient  Education Provided: Role of Therapy; Safety; Energy Conservation;Transfer Training;Precautions  Education Provided Comments: Pt. educated on the importance of mobility to build functional endurance for selfcare. Education Method: Verbal;Demonstration  Barriers to Learning: Cognition  Education Outcome: Continued education needed    Plan  Occupational Therapy Plan  Times Per Week: 4-5x/week  Times Per Day: Once a day  Current Treatment Recommendations: Strengthening;Balance training;Functional mobility training;Self-Care / ADL; Safety education & training; Endurance training;Patient/Caregiver education & training;Equipment evaluation, education, & procurement;Positioning;Cognitive/Perceptual training;Cognitive reorientation  Additional Comments: Cont with stated POC    Goals  Patient Goals   Patient goals : \"I am going to nursing home\"  Short Term Goals  Time Frame for Short Term Goals: by discharge, pt will  Short Term Goal 1: demo min A x 1 with bed mob with rails/controls  Short Term Goal 2: demo mod A x 2 with ADL transfers with approp AD/DME as needed with good safety  Short Term Goal 3: demo SBA with sitting balance at EOB x 15 min for simple ADL completion and prep for transfers  Short Term Goal 4: participate in B UE HEP for strengthening/ROM to inc. function with transfers/AdLs/mob  Short Term Goal 5: demo SBA with simple grooming task and UB ADLs with mod cues for initiation/sequencing  Additional Goals?: No    AM-PAC Score        AM-PAC Inpatient Daily Activity Raw Score: 8 (03/08/23 1213)  AM-PAC Inpatient ADL T-Scale Score : 22.86 (03/08/23 1213)  ADL Inpatient CMS 0-100% Score: 85.69 (03/08/23 1213)  ADL Inpatient CMS G-Code Modifier : CM (03/08/23 1213)      Therapy Time   Individual Concurrent Group Co-treatment Time In 0901         Time Out 0914         Minutes 15             Co-treatment with PT warranted secondary to decreased safety and independence requiring 2 skilled therapy professionals to address individual discipline's goals. OT addressing functional reaching, ability to sequence and follow directions, bed mobility tech, and functional UE strength.      JOANNA Lau

## 2023-03-09 VITALS
TEMPERATURE: 97.3 F | WEIGHT: 257.5 LBS | OXYGEN SATURATION: 94 % | SYSTOLIC BLOOD PRESSURE: 142 MMHG | HEART RATE: 85 BPM | RESPIRATION RATE: 16 BRPM | BODY MASS INDEX: 36.05 KG/M2 | DIASTOLIC BLOOD PRESSURE: 67 MMHG | HEIGHT: 71 IN

## 2023-03-09 LAB
ABSOLUTE EOS #: 0.05 K/UL (ref 0–0.44)
ABSOLUTE IMMATURE GRANULOCYTE: 0.08 K/UL (ref 0–0.3)
ABSOLUTE LYMPH #: 1.61 K/UL (ref 1.1–3.7)
ABSOLUTE MONO #: 0.49 K/UL (ref 0.1–1.2)
ANA SER QL IA: NEGATIVE
ANION GAP SERPL CALCULATED.3IONS-SCNC: 9 MMOL/L (ref 9–17)
BASOPHILS # BLD: 0 % (ref 0–2)
BASOPHILS ABSOLUTE: 0.03 K/UL (ref 0–0.2)
BUN SERPL-MCNC: 21 MG/DL (ref 8–23)
BUN/CREAT BLD: 30 (ref 9–20)
CALCIUM SERPL-MCNC: 8.4 MG/DL (ref 8.6–10.4)
CHLORIDE SERPL-SCNC: 102 MMOL/L (ref 98–107)
CO2 SERPL-SCNC: 23 MMOL/L (ref 20–31)
CREAT SERPL-MCNC: 0.71 MG/DL (ref 0.5–0.9)
DSDNA IGG SER QL IA: 5.1 IU/ML
EOSINOPHILS RELATIVE PERCENT: 1 % (ref 1–4)
GFR SERPL CREATININE-BSD FRML MDRD: >60 ML/MIN/1.73M2
GLUCOSE BLD-MCNC: 169 MG/DL (ref 65–105)
GLUCOSE BLD-MCNC: 205 MG/DL (ref 65–105)
GLUCOSE SERPL-MCNC: 145 MG/DL (ref 70–99)
HCT VFR BLD AUTO: 29.7 % (ref 36.3–47.1)
HGB BLD-MCNC: 9.2 G/DL (ref 11.9–15.1)
IMMATURE GRANULOCYTES: 1 %
LYMPHOCYTES # BLD: 18 % (ref 24–43)
MCH RBC QN AUTO: 27.1 PG (ref 25.2–33.5)
MCHC RBC AUTO-ENTMCNC: 31 G/DL (ref 28.4–34.8)
MCV RBC AUTO: 87.6 FL (ref 82.6–102.9)
MONOCYTES # BLD: 5 % (ref 3–12)
MYELOPEROXIDASE AB: 0 AU/ML (ref 0–19)
NRBC AUTOMATED: 0 PER 100 WBC
NUCLEAR IGG SER IA-RTO: 0.4 U/ML
PDW BLD-RTO: 15.5 % (ref 11.8–14.4)
PLATELET # BLD AUTO: 120 K/UL (ref 138–453)
PMV BLD AUTO: 11.1 FL (ref 8.1–13.5)
POTASSIUM SERPL-SCNC: 3.6 MMOL/L (ref 3.7–5.3)
RBC # BLD: 3.39 M/UL (ref 3.95–5.11)
RBC # BLD: ABNORMAL 10*6/UL
SEG NEUTROPHILS: 75 % (ref 36–65)
SEGMENTED NEUTROPHILS ABSOLUTE COUNT: 6.79 K/UL (ref 1.5–8.1)
SODIUM SERPL-SCNC: 134 MMOL/L (ref 135–144)
WBC # BLD AUTO: 9.1 K/UL (ref 3.5–11.3)

## 2023-03-09 PROCEDURE — 6370000000 HC RX 637 (ALT 250 FOR IP): Performed by: NURSE PRACTITIONER

## 2023-03-09 PROCEDURE — 97530 THERAPEUTIC ACTIVITIES: CPT

## 2023-03-09 PROCEDURE — 85025 COMPLETE CBC W/AUTO DIFF WBC: CPT

## 2023-03-09 PROCEDURE — 2580000003 HC RX 258: Performed by: NURSE PRACTITIONER

## 2023-03-09 PROCEDURE — 82947 ASSAY GLUCOSE BLOOD QUANT: CPT

## 2023-03-09 PROCEDURE — 2700000000 HC OXYGEN THERAPY PER DAY

## 2023-03-09 PROCEDURE — 94660 CPAP INITIATION&MGMT: CPT

## 2023-03-09 PROCEDURE — 6370000000 HC RX 637 (ALT 250 FOR IP): Performed by: FAMILY MEDICINE

## 2023-03-09 PROCEDURE — 80048 BASIC METABOLIC PNL TOTAL CA: CPT

## 2023-03-09 PROCEDURE — 97535 SELF CARE MNGMENT TRAINING: CPT

## 2023-03-09 PROCEDURE — 94761 N-INVAS EAR/PLS OXIMETRY MLT: CPT

## 2023-03-09 RX ORDER — CIPROFLOXACIN 500 MG/1
500 TABLET, FILM COATED ORAL 2 TIMES DAILY
Qty: 4 TABLET | Refills: 0 | Status: SHIPPED | OUTPATIENT
Start: 2023-03-09 | End: 2023-03-09 | Stop reason: HOSPADM

## 2023-03-09 RX ORDER — METOPROLOL SUCCINATE 25 MG/1
75 TABLET, EXTENDED RELEASE ORAL DAILY
Qty: 30 TABLET | Refills: 0 | Status: SHIPPED | OUTPATIENT
Start: 2023-03-10

## 2023-03-09 RX ORDER — ASPIRIN 325 MG
325 TABLET ORAL DAILY
Qty: 30 TABLET | Refills: 0 | Status: SHIPPED | OUTPATIENT
Start: 2023-03-10

## 2023-03-09 RX ADMIN — ACETAMINOPHEN 650 MG: 325 TABLET ORAL at 08:33

## 2023-03-09 RX ADMIN — GABAPENTIN 100 MG: 100 CAPSULE ORAL at 14:52

## 2023-03-09 RX ADMIN — ASPIRIN 325 MG: 325 TABLET ORAL at 08:32

## 2023-03-09 RX ADMIN — SPIRONOLACTONE 25 MG: 25 TABLET ORAL at 08:35

## 2023-03-09 RX ADMIN — FENOFIBRATE 54 MG: 54 TABLET ORAL at 08:32

## 2023-03-09 RX ADMIN — SODIUM CHLORIDE, PRESERVATIVE FREE 10 ML: 5 INJECTION INTRAVENOUS at 09:14

## 2023-03-09 RX ADMIN — APIXABAN 5 MG: 5 TABLET, FILM COATED ORAL at 08:34

## 2023-03-09 RX ADMIN — ACETAMINOPHEN 650 MG: 325 TABLET ORAL at 04:46

## 2023-03-09 RX ADMIN — INSULIN LISPRO 2 UNITS: 100 INJECTION, SOLUTION INTRAVENOUS; SUBCUTANEOUS at 12:14

## 2023-03-09 RX ADMIN — FUROSEMIDE 40 MG: 40 TABLET ORAL at 08:32

## 2023-03-09 RX ADMIN — GLIPIZIDE 2.5 MG: 5 TABLET ORAL at 05:46

## 2023-03-09 RX ADMIN — ANTI-FUNGAL POWDER MICONAZOLE NITRATE TALC FREE: 1.42 POWDER TOPICAL at 00:31

## 2023-03-09 RX ADMIN — METOPROLOL SUCCINATE 75 MG: 50 TABLET, EXTENDED RELEASE ORAL at 08:33

## 2023-03-09 RX ADMIN — ANTI-FUNGAL POWDER MICONAZOLE NITRATE TALC FREE: 1.42 POWDER TOPICAL at 14:52

## 2023-03-09 RX ADMIN — DILTIAZEM HYDROCHLORIDE 120 MG: 60 CAPSULE, EXTENDED RELEASE ORAL at 08:32

## 2023-03-09 RX ADMIN — METFORMIN HYDROCHLORIDE 500 MG: 500 TABLET, FILM COATED ORAL at 08:35

## 2023-03-09 RX ADMIN — ANTI-FUNGAL POWDER MICONAZOLE NITRATE TALC FREE: 1.42 POWDER TOPICAL at 08:38

## 2023-03-09 RX ADMIN — GABAPENTIN 100 MG: 100 CAPSULE ORAL at 08:32

## 2023-03-09 RX ADMIN — SERTRALINE 25 MG: 25 TABLET, FILM COATED ORAL at 08:33

## 2023-03-09 ASSESSMENT — PAIN DESCRIPTION - LOCATION: LOCATION: HEAD

## 2023-03-09 NOTE — PLAN OF CARE
Problem: Discharge Planning  Goal: Discharge to home or other facility with appropriate resources  3/9/2023 0625 by Ligia Mathews RN  Outcome: Progressing  3/8/2023 1821 by Beatriz Desai RN  Outcome: Progressing     Problem: Pain  Goal: Verbalizes/displays adequate comfort level or baseline comfort level  3/9/2023 0625 by Ligia Mathews RN  Outcome: Progressing  3/8/2023 1821 by Beatriz Desai RN  Outcome: Progressing     Problem: Confusion  Goal: Confusion, delirium, dementia, or psychosis is improved or at baseline  Description: INTERVENTIONS:  1. Assess for possible contributors to thought disturbance, including medications, impaired vision or hearing, underlying metabolic abnormalities, dehydration, psychiatric diagnoses, and notify attending LIP  2. [place] high risk fall precautions, as indicated  3. Provide frequent short contacts to provide reality reorientation, refocusing and direction  4. Decrease environmental stimuli, including noise as appropriate  5. Monitor and intervene to maintain adequate nutrition, hydration, elimination, sleep and activity  6. If unable to ensure safety without constant attention obtain sitter and review sitter guidelines with assigned personnel  7. Initiate Psychosocial CNS and Spiritual Care consult, as indicated  3/9/2023 9293 by Ligia Mathews RN  Outcome: Progressing  3/8/2023 1821 by Beatriz Desai RN  Outcome: Progressing     Problem: Skin/Tissue Integrity  Goal: Absence of new skin breakdown  Description: 1. Monitor for areas of redness and/or skin breakdown  2. Assess vascular access sites hourly  3. Every 4-6 hours minimum:  Change oxygen saturation probe site  4. Every 4-6 hours:  If on nasal continuous positive airway pressure, respiratory therapy assess nares and determine need for appliance change or resting period.   3/9/2023 0625 by Ligia Mathews RN  Outcome: Progressing  3/8/2023 1821 by Beatriz Desai RN  Outcome: Progressing     Problem: Safety - Adult  Goal: Free from fall injury  3/9/2023 0625 by Deana Manuel RN  Outcome: Progressing  3/8/2023 1821 by Cathi Chilel RN  Outcome: Progressing     Problem: Chronic Conditions and Co-morbidities  Goal: Patient's chronic conditions and co-morbidity symptoms are monitored and maintained or improved  3/9/2023 0625 by Deana Manuel RN  Outcome: Progressing  3/8/2023 1821 by Cathi Chilel RN  Outcome: Progressing     Problem: Neurosensory - Adult  Goal: Achieves stable or improved neurological status  3/9/2023 0625 by Deana Manuel RN  Outcome: Progressing  3/8/2023 1821 by Cathi Chilel RN  Outcome: Progressing     Problem: Musculoskeletal - Adult  Goal: Return mobility to safest level of function  3/9/2023 0625 by Deana Manuel RN  Outcome: Progressing  3/8/2023 1821 by Cathi Chilel RN  Outcome: Progressing     Problem: Respiratory - Adult  Goal: Achieves optimal ventilation and oxygenation  3/9/2023 0625 by Deana Manuel RN  Outcome: Progressing  3/8/2023 1821 by Cathi Chilel RN  Outcome: Progressing     Problem: Nutrition Deficit:  Goal: Optimize nutritional status  3/9/2023 0625 by Deana Manuel RN  Outcome: Progressing  3/8/2023 1821 by Cathi Chilel RN  Outcome: Progressing

## 2023-03-09 NOTE — DISCHARGE SUMMARY
[address] [place] [address].,    Adult Hospitalist      Patient ID: Dominic Carrillo  MRN: [id number]     Acct:  [de-identified]       Patient's PCP: James Chao MD    Admit Date: 2/28/2023     Discharge Date:   ***    Admitting Physician: María Elena Stafford MD    Discharge Physician: Linda Dickerson MD     CONSULTANTS: Patient Care Team:  James Chao MD as PCP - General    PROCEDURES PERFORMED: ***    Active Discharge Diagnoses:  ***      Primary Problem  Weakness    Hospital Course: ***    The plan was discussed in detail with patient who agreed with the plan and verbalized understanding . The patient was seen and examined on day of discharge and this discharge summary is in conjunction with any daily progress note from day of discharge. Hospital Data:    Labs:    Hematology:  Recent Labs     03/07/23  0540 03/09/23  0305   WBC 11.0 9.1   RBC 3.31* 3.39*   HGB 9.0* 9.2*   HCT 28.6* 29.7*   MCV 86.4 87.6   MCH 27.2 27.1   MCHC 31.5 31.0   RDW 14.8* 15.5*   * 120*   MPV 11.7 11.1     Chemistry:  Recent Labs     03/07/23  0540 03/08/23  0640 03/09/23  0305    133* 134*   K 3.4* 3.9 3.6*    102 102   CO2 23 21 23   GLUCOSE 159* 160* 145*   BUN 26* 20 21   CREATININE 0.87 0.76 0.71   MG 1.9  --   --    ANIONGAP 7* 10 9   LABGLOM >60 >60 >60   CALCIUM 8.0* 8.6 8.4*     Recent Labs     03/07/23  1552 03/07/23  1621 03/08/23  0733 03/08/23  1151 03/08/23  1606 03/08/23  1958 03/09/23  0736 03/09/23  1135   LABALBU 2.3*  --   --   --   --   --   --   --    POCGLU  --    < > 179* 165* 188* 165* 169* 205*    < > = values in this interval not displayed.      Lab Results   Component Value Date    INR 1.3 03/01/2023    INR 1.7 02/05/2023    INR 1.1 08/24/2012    PROTIME 16.2 (H) 03/01/2023    PROTIME 19.9 (H) 02/05/2023    PROTIME 11.4 08/24/2012     Lab Results   Component Value Date/Time    SPECIAL NOT REPORTED 08/29/2012 06:42 PM     Lab Results   Component Value Date/Time    CULTURE NO GROWTH 2 DAYS 03/07/2023 03:52 PM       Lab Results   Component Value Date/Time    POCPH 7.505 03/05/2023 06:08 AM    POCPCO2 22.3 03/05/2023 06:08 AM    POCPO2 117.3 03/05/2023 06:08 AM    POCHCO3 17.6 03/05/2023 06:08 AM    NBEA 4 03/05/2023 06:08 AM    CJUC4DPT 99 03/05/2023 06:08 AM    FIO2 35.0 03/05/2023 06:08 AM       Radiology:    XR LUMBAR SPINE (2-3 VIEWS)    Result Date: 3/3/2023  No evidence of fracture, compression or dislocation in the lumbar spine or in visualized upper sacrum. At L5-S1 level, severe degenerative disc disease and mild-to-moderate facet osteoarthritis. At L4-L5 level severe degenerative disc disease and moderate facet osteoarthritis. At L3-L4 level moderate degenerative disc disease and moderate facet osteoarthritis. CT HEAD WO CONTRAST    Result Date: 3/3/2023  Multiple remote infarcts without acute intracranial abnormality. XR CHEST PORTABLE    Result Date: 3/6/2023  No acute process. Right-sided PICC terminates in the right atrium     XR CHEST PORTABLE    Result Date: 3/5/2023  Linear infiltrates seen in both lungs persist suggesting pulmonary edema     XR CHEST PORTABLE    Result Date: 3/3/2023  Findings suggest congestive heart failure     XR CHEST 1 VIEW    Result Date: 3/4/2023  Worsening pattern of pulmonary edema. IR LUMBAR PUNCTURE FOR DIAGNOSIS    Result Date: 3/7/2023  Successful fluoroscopic-guided lumbar puncture. CTA HEAD NECK W CONTRAST    Result Date: 3/4/2023  CT Brain: 1. Punctate acute infarcts seen on recent MRI are not well appreciated on CT modality. 2. Otherwise, no acute intracranial abnormality. 3. Chronic encephalomalacia/gliosis present the bilateral parietal lobes and right temporal lobe. CTA Head/Neck: Limited evaluation secondary to poor contrast bolus/imaging timing, motion degradation, and patient positioning. 1. Diffusely irregular and likely severely stenotic bilateral cervical and intracranial ICAs.   Finding may in part be related to an underlying angiopathy such as fibromuscular dysplasia or chronic dissection/thrombosis. Aforementioned limitations limit evaluation but findings are likely similar to 2021 CTA. Can consider repeat evaluation if clinically indicated. 2. Otherwise, no evidence of large vessel occlusion or significant stenosis in the remaining major arteries of the head and neck. 3. Prominent appearance of the partially visualized pulmonary artery. Finding may be related to pulmonary arterial hypertension. MRI BRAIN W WO CONTRAST    Result Date: 3/3/2023  Acute microinfarcts within the caudate heads and right thalamus. The findings were sent to the Radiology Results [address] at 5:30 pm on 3/3/2023 to be communicated to a licensed caregiver.          All radiological studies reviewed      Reviews of Symptoms:    Limited review of systems    Physical Exam:    Vitals:  BP (!) 142/67   Pulse 85   Temp 97.3 °F (36.3 °C) (Temporal)   Resp 16   Ht 5' 11\" (1.803 m)   Wt 257 lb 8 oz (116.8 kg)   SpO2 94%   BMI 35.91 kg/m²   Temp (24hrs), Av.7 °F (36.5 °C), Min:97.2 °F (36.2 °C), Max:98.1 °F (36.7 °C)      General appearance - alert, well appearing, and in no acute distress  Mental status - oriented to person, place, and time with normal affect  Head - normocephalic and atraumatic  Eyes - pupils equal and reactive, extraocular eye movements intact, conjunctiva clear  Ears - hearing appears to be intact  Nose - no drainage noted  Mouth - mucous membranes moist  Neck - supple, no carotid bruits, thyroid not palpable  Chest - clear to auscultation, normal effort  Heart - normal rate, regular rhythm, no murmur  Abdomen - soft, nontender, nondistended, bowel sounds present all four quadrants, no masses, hepatomegaly or splenomegaly  Neurological - normal speech, no focal findings or movement disorder noted, cranial nerves II through XII grossly intact  Extremities - peripheral pulses palpable, no pedal edema or calf pain with palpation  Skin - no gross lesions, rashes, or induration noted      Consults:  IP CONSULT TO HOSPITALIST  IP CONSULT TO SOCIAL WORK  IP CONSULT TO NEUROLOGY  IP CONSULT TO CARDIOLOGY  IP CONSULT TO CRITICAL CARE    Disposition: SNF    Discharged Condition: Stable    Follow Up: Harpreet Gordillo MD  [address] [facility]  [phone]    Call  Follow up care      Lab Frequency Next Occurrence   Basic Metabolic Panel Once 04/09/3370   Up with assistance PRN    Intake and output EVERY 8 HOURS    Initiate Oxygen Therapy Protocol PRN    Pulse Oximetry Spot Check PRN    Basic Metabolic Panel w/ Reflex to MG Daily (Lab)    HYPOGLYCEMIA TREATMENT: blood glucose LESS THAN 70 mg/dL and patient ALERT and TOLERATING PO PRN    HYPOGLYCEMIA TREATMENT: blood glucose LESS THAN 70 mg/dL and patient NOT ALERT or NPO PRN    POCT Glucose PRN    POCT glucose 4 TIMES DAILY BEFORE MEALS & AT BEDTIME    HYPOGLYCEMIA TREATMENT: blood glucose LESS THAN 70 mg/dL and patient ALERT and TOLERATING PO PRN    HYPOGLYCEMIA TREATMENT: blood glucose LESS THAN 70 mg/dL and patient NOT ALERT or NPO PRN    POCT Glucose PRN    Adult NIV/Positive Airway Pressure CONTINUOUS WITH Q4H RT CHECKS    Pulse oximetry, continuous CONTINUOUS WITH Q4H RT CHECKS    POCT glucose 4 TIMES DAILY BEFORE MEALS & AT BEDTIME    HYPOGLYCEMIA TREATMENT: blood glucose LESS THAN 70 mg/dL and patient ALERT and TOLERATING PO PRN    HYPOGLYCEMIA TREATMENT: blood glucose LESS THAN 70 mg/dL and patient NOT ALERT or NPO PRN    POCT Glucose PRN    Nasal Cannula Oxygen DAILY (RT)    CBC with Auto Differential Daily (Lab)          Diet: ADULT DIET;  Regular; Low Fat/Low Chol/High Fiber/HEIKE    Discharge Medications:      Medication List        START taking these medications      aspirin 325 MG tablet  Take 1 tablet by mouth daily  Start taking on: March 10, 2023     ciprofloxacin 500 MG tablet  Commonly known as: CIPRO  Take 1 tablet by mouth 2 times daily for 2 days     gabapentin 100 MG capsule  Commonly known as: NEURONTIN  Take 1 capsule by mouth 3 times daily for 3 days.             CHANGE how you take these medications      metoprolol succinate 25 MG extended release tablet  Commonly known as: TOPROL XL  Take 3 tablets by mouth daily  Start taking on: March 10, 2023  What changed:   medication strength  how much to take            CONTINUE taking these medications      apixaban 5 MG Tabs tablet  Commonly known as: ELIQUIS     atorvastatin 20 MG tablet  Commonly known as: LIPITOR     cyanocobalamin 1000 MCG tablet     dilTIAZem 120 MG extended release capsule  Commonly known as: CARDIZEM 12 HR     fenofibrate 145 MG tablet  Commonly known as: TRICOR     furosemide 40 MG tablet  Commonly known as: LASIX  Take 1 tablet by mouth daily     glimepiride 4 MG tablet  Commonly known as: AMARYL     hydrOXYzine HCl 25 MG tablet  Commonly known as: ATARAX     metFORMIN 500 MG tablet  Commonly known as: GLUCOPHAGE     methenamine 1 g tablet  Commonly known as: HIPREX     Myrbetriq 25 MG Tb24  Generic drug: mirabegron     NovoLOG FlexPen 100 UNIT/ML injection pen  Generic drug: insulin aspart     QUEtiapine 25 MG tablet  Commonly known as: SEROQUEL  Take 1 tablet by mouth every evening     sertraline 25 MG tablet  Commonly known as: ZOLOFT     spironolactone 25 MG tablet  Commonly known as: ALDACTONE     Vascepa 1 g Caps capsule  Generic drug: Icosapent Ethyl            STOP taking these medications      potassium chloride 20 MEQ extended release tablet  Commonly known as: KLOR-CON M               Where to Get Your Medications        These medications were sent to [address] [place] [address] [address] [facility] [phone]  [address] [place], [address] [address]      Phone: [phone]   aspirin 325 MG tablet  ciprofloxacin 500 MG tablet  metoprolol succinate 25 MG extended release tablet       You can get these medications from any pharmacy    Bring a paper prescription for each of these medications  gabapentin 100 MG capsule  QUEtiapine 25 MG tablet         Code Status:  Full Code    Time Spent on discharge is  {Blank single:47803::\"15 mins\",\"30 mins\",\"35 mins\",\"***\"} in patient examination, evaluation, counseling as well as medication reconciliation, prescriptions for required medications, discharge plan and follow up. Electronically signed by Adeel Garcia MD on 3/9/2023 at 12:33 PM     Thank you Dr. Meryle Best, MD for the opportunity to be involved in this patient's care. This note was created with the assistance of a speech-recognition program.  Although the intention is to generate a document that actually reflects the content of the visit, no guarantees can be provided that every mistake has been identified and corrected by editing. Note was updated later by me after  physical examination and  completion of the assessment.

## 2023-03-09 NOTE — CARE COORDINATION
Social Kayla Monaco will admit today. [facility] will transport. Orders faxed. Nurse to call report to [phone]. HENS completed. Notified sister. She is agreeable with dc plans.  Sandeep Paris

## 2023-03-09 NOTE — PROGRESS NOTES
Patient discharged via [facility] transport in stable condition. Report Called to Leanne at [facility].     Sharyle Loll, RN

## 2023-03-09 NOTE — PROGRESS NOTES
Physical Therapy  Facility/Department: [facility]  Rehabilitation Physical Therapy Treatment Note    NAME: Jesus Ulloa  : 1960 (60 y.o.)  MRN: [id number]  CODE STATUS: Full Code    Date of Service: 3/9/23   Pt currently functioning below baseline. Recommend daily inpatient skilled therapy at time of discharge to maximize long term outcomes and prevent re-admission. Please refer to AM-PAC score for current level of function. Restrictions:  Restrictions/Precautions: Fall Risk, Up as Tolerated, Bed Alarm, General Precautions  Position Activity Restriction  Other position/activity restrictions: MRDD     SUBJECTIVE  Subjective  Subjective: Patient up in bed upon therapists arrival.  Patient states \"I don't want to eat a Bird\" Therapists redirects patient. Patient very resistive to motions and requires MAX redirection throughout treatment to maximize engagement. OBJECTIVE  Cognition  Overall Cognitive Status: Exceptions  Arousal/Alertness: Appropriate responses to stimuli  Following Commands: Inconsistently follows commands  Attention Span: Difficulty attending to directions; Unable to maintain attention  Memory: Decreased recall of biographical Information;Decreased recall of precautions;Decreased recall of recent events  Safety Judgement: Decreased awareness of need for safety;Decreased awareness of need for assistance  Problem Solving: Assistance required to generate solutions;Assistance required to implement solutions;Decreased awareness of errors;Assistance required to correct errors made  Insights: Not aware of deficits  Initiation: Requires cues for all  Sequencing: Requires cues for all  Cognition Comment: MRDD; speech difficult to understand, pt easily agitated at times  Orientation  Overall Orientation Status: Impaired  Orientation Level: Oriented to person;Disoriented to time;Disoriented to situation;Disoriented to place    Functional Mobility  Bed Mobility  Overall Assistance Level: Maximum Assistance; Requires x 2 Assistance  Roll Left  Assistance Level: Moderate assistance; Requires x 2 assistance  Roll Right  Assistance Level: Moderate assistance; Requires x 2 assistance  Supine to Sit  Assistance Level: Maximum assistance;Dependent; Requires x 2 assistance  Skilled Clinical Factors: Patient requires MAX verbal and tactile cues for minimal engagement and motions. Patient requires increased redirection and vc's to tasks at hand. Patient resistive upon seated EOB posture pushing against therapists. Odalis Courts placed in front of patient to aid in self support. Patient with decreased safety awareness and attmepts to push backwards versus stabilize self with device  Scooting  Assistance Level: Maximum assistance; Requires x 2 assistance  Skilled Clinical Factors: vc's to scoot all the way out and place feet flat on floor for increased stability and support. Patient very impulsive and yells out at times \"NO\"  Balance  Sitting Balance: Maximum assistance  Standing Balance  Time: 10  Activity: seated EOB working on self support and postural awareness. Patient requries vc's for hand placement and stability techniques. Transfers  Surface: From bed; To bed  Additional Factors: Verbal cues; Hand placement cues; With handrails; Set-up (José Luis lift)  Sit to Stand  Assistance Level: Maximum assistance;Dependent; Requires x 2 assistance  Skilled Clinical Factors: attempted x4 with Odalis Courts patient unable to promote to stance and guards against progression techniques. Neuromuscular Education  Neuromuscular Education: Lower extremity; Sitting    ASSESSMENT/PROGRESS TOWARDS GOALS     AM-PAC Inpatient Mobility Raw Score  9   AM-[facility] Inpatient T-Scale Score  30.55   Mobility Inpatient CMS 0-100% Score 81.38   Mobility Inpatient CMS G-Code Modifier  CM       Assessment  Assessment: Patient hits self in head and requires redirection.  Patient with global deconditioning and requires MAX x2 for bed mobility, seated EOB balance and requires max redirection and increased time throughout. Activity Tolerance: Treatment limited secondary to decreased cognition;Patient limited by fatigue  Discharge Recommendations: Patient would benefit from continued therapy after discharge    Goals  Patient Goals   Patient Goals : Pt. states she knows she is going to a \"nursing home\"  Short Term Goals  Time Frame for Short Term Goals: 12 visits:  Short Term Goal 1: Pt. to requrie mod A for bed mob. Short Term Goal 2: Pt to requrie mod A x2 for sit to stand transfer with RW  Short Term Goal 3: Pt. to takes steps along EOB with RW, mod A x 2  Short Term Goal 4: Pt. to have good dynamic sitting balance  Short Term Goal 5: Pt. to tolerate 25+ min. of PT daily for ther ex/ balance training/ functional mob. training    PLAN OF CARE/SAFETY  Physcial Therapy Plan  General Plan: 5-7 times per week  Specific Instructions for Next Treatment: José Luis lift  Current Treatment Recommendations: Strengthening;ROM;Balance training;Functional mobility training;Transfer training; Endurance training;Home exercise program;Safety education & training;Patient/Caregiver education & training; Therapeutic activities  Safety Devices  Type of Devices: Patient at risk for falls;Call light within reach;Nurse notified; All fall risk precautions in place; Left in chair;Chair alarm in place    EDUCATION  Education  Education Given To: Patient  Education Provided: Role of Therapy; ADL Function;Plan of Care;IADL Function;Home Exercise Program;Mobility Training;Precautions;Transfer Training; Safety; Energy Conservation  Education Method: Verbal  Barriers to Learning: Cognition  Education Outcome: Continued education needed        Therapy Time   Individual Concurrent Group Co-treatment   Time In       [phone]   Time Out       0900   Minutes       45     Co-treatment with OT warranted secondary to decreased safety and independence requiring 2 skilled therapy professionals to address individual discipline's goals. PT addressing pre gait trunk strengthening, weight shifting prior to transfers, transfer training, and postural control in sitting.      Betsy Ramirez, PTA, 03/09/23 at 2:11 PM

## 2023-03-09 NOTE — PROGRESS NOTES
End Of Shift Note  [address] CVICU  Summary of shift: uneventful night. Patietn alert and oriented. Vitals remained stable overnight. Room air during the day and wore BiPAP last night while asleep. Had a large BM overnight. Received PRN tylenol for a headache.  Otherwise, plan is to d/c possibly today to [facility]    Vitals:    Vitals:    03/09/23 0110 03/09/23 0333 03/09/23 0400 03/09/23 0548   BP: (!) 103/55  (!) 106/55    Pulse: 68 79 83    Resp: 21 19 23    Temp:   97.2 °F (36.2 °C)    TempSrc:   Temporal    SpO2: 99% 95% 93%    Weight:    257 lb 8 oz (116.8 kg)   Height:            I&O:   Intake/Output Summary (Last 24 hours) at 3/9/2023 0701  Last data filed at 3/9/2023 0526  Gross per 24 hour   Intake 1180.24 ml   Output 1100 ml   Net 80.24 ml       Resp Status: room air/BiPAP while asleep     Ventilator Settings:     / / /FiO2 : 30 %    Critical Care IV infusions:   dextrose      dexmedetomidine (PRECEDEX) IV infusion Stopped (03/05/23 0308)    sodium chloride          LDA:   PICC Double Lumen 94/99/91 Right Basilic (Active)   Number of days: 3       Peripheral IV 02/28/23 Right Antecubital (Active)   Number of days: 8       Peripheral IV 03/04/23 Left Forearm (Active)   Number of days: 4       External Urinary Catheter (Active)   Number of days: 3

## 2023-03-09 NOTE — PROGRESS NOTES
Occupational Therapy  Facility/Department: [facility]  Daily Treatment Note  NAME: Jesus Ulloa  : 1960  MRN: [id number]    Date of Service: 3/9/2023    Discharge Recommendations:  Patient would benefit from continued therapy after discharge     Pt currently functioning below baseline. Recommend daily inpatient skilled therapy at time of discharge to maximize long term outcomes and prevent re-admission. Please refer to AM-PAC score for current level of function. RN reports patient is medically stable for therapy treatment this date. Chart reviewed prior to treatment and patient is agreeable for therapy. All lines intact and patient positioned comfortably at end of treatment. All patient needs addressed prior to ending therapy session. [facility]: 9    Patient Diagnosis(es): The encounter diagnosis was Generalized weakness. Assessment    Assessment: Pt required 2 staff assist with MAX encouragement to sit EOB. Pt very resistive to sitting EOB this date, even though pt wanted to sit in her recliner for breakfast. Pt refused to attempt to stand with laine ernestody. Pt returned to supine and was skylifted to recliner. Pt safely in recliner finishing breakfast upon exit. Pt continues to be limited by poor activity tolerance, lack of motivation, resistive to assistance and education, and overall weakness. Skilled OT is indicated to increase overall IND and safety with self-care and functional tasks to reduce caregiver burden. Activity Tolerance: Treatment limited secondary to decreased cognition;Patient limited by fatigue  Discharge Recommendations: Patient would benefit from continued therapy after discharge      Plan   Occupational Therapy Plan  Times Per Week: 4-5x/week  Times Per Day: Once a day  Current Treatment Recommendations: Strengthening;Balance training;Functional mobility training;Self-Care / ADL; Safety education & training; Endurance training;Patient/Caregiver education & training;Equipment evaluation, education, & procurement;Positioning;Cognitive/Perceptual training;Cognitive reorientation  Additional Comments: Cont with stated POC     Restrictions  Restrictions/Precautions  Restrictions/Precautions: Fall Risk;Up as Tolerated; Bed Alarm;General Precautions  Required Braces or Orthoses?: No  Position Activity Restriction  Other position/activity restrictions: MRDD    Subjective   Subjective  Subjective: \"I don't want to eat birds! \" Pt very resistive to OT tx this date  Orientation  Overall Orientation Status: Impaired  Orientation Level: Oriented to person;Disoriented to time;Disoriented to situation;Disoriented to place  Cognition  Overall Cognitive Status: Exceptions  Arousal/Alertness: Appropriate responses to stimuli  Following Commands: Inconsistently follows commands  Attention Span: Difficulty attending to directions; Unable to maintain attention  Memory: Decreased recall of biographical Information;Decreased recall of precautions;Decreased recall of recent events  Safety Judgement: Decreased awareness of need for safety;Decreased awareness of need for assistance  Problem Solving: Assistance required to generate solutions;Assistance required to implement solutions;Decreased awareness of errors;Assistance required to correct errors made  Insights: Not aware of deficits  Initiation: Requires cues for all  Sequencing: Requires cues for all  Cognition Comment: MRDD; speech difficult to understand, pt easily agitated at times        Objective           Bed Mobility Training  Bed Mobility Training: Yes  Overall Level of Assistance: Maximum assistance;Assist X2 (Pt sat EOB ~10 mins with MAX A-Min A for sitting balance as pt was very resistive to sitting EOB. Pt wanted to sit the chair for breakfast but did not want to sit/stand to get there. Pt was yelling and hitting herself while sitting EOB and needed constant redirection and calming strategies)  Interventions: Tactile cues; Verbal cues; Safety awareness training;Visual cues;Manual cues (MAX cues for participating in bed mob, initation, sequencing, proper bed mob tech, use of bed rails, and use of BUE to hold herself upright.)  Rolling: Maximum assistance;Assist X2  Supine to Sit: Maximum assistance;Assist X2  Sit to Supine: Maximum assistance;Assist X2 (pt returned to supine and skylifted to recliner)    Transfer Training  Transfer Training: No Thedore David pang brought up to pt, but pt refused to attempt to stand. Pt skylifted to recliner for breakfast)     ADL  Feeding: Setup;Supervision  Feeding Skilled Clinical Factors: Writer cut up pt's food, opened containers, and oriented pt to breakfast tray. Pt able to place food on fork and take bites without difficulty. Grooming: Maximum assistance  Grooming Skilled Clinical Factors: MAX A to comb her hair while seated in recliner  Toileting: Dependent/Total  Toileting Skilled Clinical Factors: pt has ext cath  Additional Comments: pt needing max cues to intitiate and sequence all tasks          Safety Devices  Type of Devices: Patient at risk for falls;Call light within reach;Nurse notified; All fall risk precautions in place; Left in chair;Chair alarm in place     Patient Education  Education Given To: Patient  Education Provided: Role of Therapy;Plan of Care;Home Exercise Program;Precautions; ADL Adaptive Strategies;Transfer Training;Energy Conservation; Fall Prevention Strategies; Equipment; Family Education;Orientation  Education Provided Comments: OT POC, purpose of OT in acute care, benefits of participating in therapy, benefits of sitting in recliner for breakfast, call light/fall prevention, safety in function  Education Method: Demonstration;Verbal  Barriers to Learning: Cognition  Education Outcome: Continued education needed; Unable to demonstrate understanding; Unable to verbalize    Goals  Short Term Goals  Time Frame for Short Term Goals: by discharge, pt will  Short Term Goal 1: demo min A x 1 with bed mob with rails/controls  Short Term Goal 2: demo mod A x 2 with ADL transfers with approp AD/DME as needed with good safety  Short Term Goal 3: demo SBA with sitting balance at EOB x 15 min for simple ADL completion and prep for transfers  Short Term Goal 4: participate in B UE HEP for strengthening/ROM to inc. function with transfers/AdLs/mob  Short Term Goal 5: demo SBA with simple grooming task and UB ADLs with mod cues for initiation/sequencing  Additional Goals?: No  Patient Goals   Patient goals : \"I am going to nursing home\"       Therapy Time   Individual Concurrent Group Co-treatment   Time In 0052         Time Out 0900         Minutes 38               Upon writer exit, call light within reach, pt retired to chair. All lines intact and patient positioned comfortably. All patient needs addressed prior to ending therapy session. Chart reviewed prior to treatment and patient is agreeable for therapy. RN reports patient is medically stable for therapy treatment this date. Co-treatment with PT warranted secondary to decreased safety and independence requiring 2 skilled therapy professionals to address individual discipline's goals. OT addressing preparation for ADL transfer, sitting balance for increased ADL performance, sitting/activity tolerance, functional reaching, environmental safety/scanning, fall prevention, functional mobility for ADL transfers, ability to sequence and follow directions, bed mobility tech, and functional UE strength.     Obdulia Brown OTR/L

## 2023-03-09 NOTE — PROGRESS NOTES
Pulmonary Critical Care Progress Note  George Shi MD     Patient seen for the follow up of  Weakness     Subjective:  She had uneventful night. Earlier she was sitting up in the bedside chair. Now she is sleeping in the bed. She is on oxygen 2 L SPO2 97%. She denied shortness of breath, cough or chest pain earlier to nursing staff. She had lumbar puncture done on 3/7/2023. Neurology signed off. She is on Eliquis now. Examination:  Vitals: BP (!) 142/67   Pulse 85   Temp 97.3 °F (36.3 °C) (Temporal)   Resp 16   Ht 5' 11\" (1.803 m)   Wt 257 lb 8 oz (116.8 kg)   SpO2 94%   BMI 35.91 kg/m²   General appearance: Sleeping but wakes up and cooperative with exam  Neck: No JVD  Lungs: Moderate air exchange, no wheezing or crackles anteriorly  Heart: regular rate and rhythm, S1, S2 normal, no gallop  Abdomen: Soft, non tender, + BS  Extremities: no cyanosis or clubbing.   Trace edema    LABs:  CBC:   Recent Labs     03/07/23  0540 03/09/23  0305   WBC 11.0 9.1   HGB 9.0* 9.2*   HCT 28.6* 29.7*   * 120*       BMP:   Recent Labs     03/07/23  0540 03/08/23  0640 03/09/23  0305    133* 134*   K 3.4* 3.9 3.6*   CO2 23 21 23   BUN 26* 20 21   CREATININE 0.87 0.76 0.71   LABGLOM >60 >60 >60   GLUCOSE 159* 160* 145*         APTT:  Recent Labs     03/06/23  2340 03/07/23  0540 03/07/23  2335 03/08/23  0640   APTT 67.3* 67.2* 68.2* 57.5*       ABG:  Lab Results   Component Value Date/Time    FIO2 35.0 03/05/2023 06:08 AM       Lab Results   Component Value Date/Time    POCPH 7.505 03/05/2023 06:08 AM    POCPCO2 22.3 03/05/2023 06:08 AM    POCPO2 117.3 03/05/2023 06:08 AM    POCHCO3 17.6 03/05/2023 06:08 AM    SPEJ2YXH 99 03/05/2023 06:08 AM    FIO2 35.0 03/05/2023 06:08 AM     Radiology:  3/6/2023  No acute cardiopulmonary process      Impression:  Acute hypoxic respiratory failure  Pulmonary edema  SVT/A-fib/CHF/mild mitral stenosis  Suspected obstructive sleep apnea/Obesity  Acute microinfarcts caudate head and right thalamus on MRI brain  E. coli UTI  Weakness/inability to ambulate  Cognitive impairment/MRDD  A-fib, DM, HTN, HLD, PE, on Eliquis  Hyponatremia    Recommendations:  Oxygen via nasal cannula, keep SPO2 90% or greater, wean as able  BiPAP support as needed  Precedex drip if necessary  Diuresis with oral Lasix  Cardiology on consult  Rocephin for UTI  DVT prophylaxis, Eliquis   PT/OT/increase activity  Discharge planning back to rehab when all arrangements can be made. No objection from pulmonary standpoint.   Discussed with RN    Electronically signed by     Tian Garcia MD on 3/9/2023 at 11:46 AM  Pulmonary Critical Care and Sleep Medicine,  [facility]  Cell: [phone]  Office: [phone]

## 2023-03-10 LAB
CSF ISOELECTRIC FOCUSING INTERPRETATION: NORMAL
MICROORGANISM SPEC CULT: ABNORMAL
MICROORGANISM SPEC CULT: NORMAL
MICROORGANISM/AGENT SPEC: ABNORMAL
MYELIN BASIC PROTEIN, CSF: 23.1 NG/ML (ref 0–5.5)
OLIGOCLONAL BANDS NUMBER: 0 BANDS (ref 0–1)
OLIGOCLONAL BANDS: NEGATIVE
SPECIMEN DESCRIPTION: ABNORMAL
SPECIMEN DESCRIPTION: NORMAL